# Patient Record
Sex: MALE | Race: WHITE | NOT HISPANIC OR LATINO | Employment: OTHER | URBAN - METROPOLITAN AREA
[De-identification: names, ages, dates, MRNs, and addresses within clinical notes are randomized per-mention and may not be internally consistent; named-entity substitution may affect disease eponyms.]

---

## 2017-01-03 ENCOUNTER — HOSPITAL ENCOUNTER (OUTPATIENT)
Dept: LAB | Facility: MEDICAL CENTER | Age: 72
End: 2017-01-03
Attending: UROLOGY
Payer: MEDICARE

## 2017-01-03 LAB — PSA SERPL DL<=0.01 NG/ML-MCNC: 0.47 NG/ML (ref 0–4)

## 2017-01-03 PROCEDURE — 36415 COLL VENOUS BLD VENIPUNCTURE: CPT

## 2017-01-03 PROCEDURE — 84153 ASSAY OF PSA TOTAL: CPT

## 2017-03-06 ENCOUNTER — TELEPHONE (OUTPATIENT)
Dept: HEALTH INFORMATION MANAGEMENT | Facility: OTHER | Age: 72
End: 2017-03-06

## 2017-03-06 ENCOUNTER — PATIENT OUTREACH (OUTPATIENT)
Dept: HEALTH INFORMATION MANAGEMENT | Facility: OTHER | Age: 72
End: 2017-03-06

## 2017-03-06 DIAGNOSIS — E11.9 CONTROLLED TYPE 2 DIABETES MELLITUS WITHOUT COMPLICATION, WITHOUT LONG-TERM CURRENT USE OF INSULIN (HCC): ICD-10-CM

## 2017-03-06 NOTE — PROGRESS NOTES
Attempt #:1     Annual Wellness Visit Scheduling  1. Scheduling Status:Scheduled       Care Gap Scheduling (Attempt to Schedule EACH Overdue Care Gap!)     Health Maintenance Due   Topic Date Due   • Annual Wellness Visit  SCHEDULED   • DIABETES MONOFILAMENT / LE EXAM  SCHEDULED   • RETINAL SCREENING  DID NOT DISCUSS   • IMM ZOSTER VACCINE  DECLINED   • IMM DTaP/Tdap/Td Vaccine (1 - Tdap) NOT DUE HAD TD VACCINE IN 2009   • A1C SCREENING  SCHEDULED LAB APT         MyChart Activation: already active

## 2017-03-06 NOTE — TELEPHONE ENCOUNTER
This patient is overdue for health maintenance topics that need orders so he can complete them.     Please review the pended orders in  to sign or make adjustments as appropriate.    Close the encounter when complete.  If declining these orders, please document a reason and route to the Outreach MA pool (p AMB  Outreach).  I will call the patient to cancel the appointment.

## 2017-03-10 ENCOUNTER — HOSPITAL ENCOUNTER (OUTPATIENT)
Dept: LAB | Facility: MEDICAL CENTER | Age: 72
End: 2017-03-10
Attending: FAMILY MEDICINE
Payer: MEDICARE

## 2017-03-10 LAB
EST. AVERAGE GLUCOSE BLD GHB EST-MCNC: 174 MG/DL
HBA1C MFR BLD: 7.7 % (ref 0–5.6)

## 2017-03-10 PROCEDURE — 83036 HEMOGLOBIN GLYCOSYLATED A1C: CPT

## 2017-03-10 PROCEDURE — 36415 COLL VENOUS BLD VENIPUNCTURE: CPT

## 2017-03-13 ENCOUNTER — TELEPHONE (OUTPATIENT)
Dept: MEDICAL GROUP | Facility: MEDICAL CENTER | Age: 72
End: 2017-03-13

## 2017-03-13 NOTE — TELEPHONE ENCOUNTER
----- Message from Lev Chinchilla M.D. sent at 3/13/2017  3:40 PM PDT -----  Please call patient and inform that his labs are abnormal. His hemoglobin A1c(test for diabetes) has worsened from 6.4 to 7.7. He will be a candidate for medication. Please advise to make an appointment with Dr. Diego to discuss his diabetes and probably start medication.

## 2017-04-06 ENCOUNTER — TELEPHONE (OUTPATIENT)
Dept: MEDICAL GROUP | Facility: MEDICAL CENTER | Age: 72
End: 2017-04-06

## 2017-04-06 NOTE — TELEPHONE ENCOUNTER
PVP COMPLETE    LAST EYE EXAM-couple years ago     ADVANCE DIRECTIVE-information requested     SPECIALTY COMMENT-n/a      VACCINES-tdap

## 2017-04-06 NOTE — TELEPHONE ENCOUNTER
ANNUAL WELLNESS VISIT PRE-VISIT PLANNING     1.  Reviewed last PCP office visit assessment and plan notes: Yes 6/02/16    2.  If any orders were placed last visit do we have Results/Consult Notes?        •  Labs? No        •  Imaging? No       •  Referrals? No     3.  Patient Care Coordination Note was updated with diagnosis information:  Yes    4.  Patient is due for these Health Maintenance Topics:   Health Maintenance Due   Topic Date Due   • Annual Wellness Visit  1945   • DIABETES MONOFILAMENT / LE EXAM  1945   • RETINAL SCREENING  03/16/1965   • IMM DTaP/Tdap/Td Vaccine (1 - Tdap) 10/02/2009          •  DANIAL letter was faxed to:   for  records    5.  Immunizations were updated in [employer] using WebIZ?: Yes       •  Web Iz Recommendations:  Tdap  Hep A, adult  Hep B, adult  Zoster (Shingles)       •  Is patient due for Tdap/Shingles? Yes.  If yes, was patient alerted of copay? No    6.  Patient has:       •   Diabetes     7.  Updated Care Team with [employer] and all specialists?        •   Gait devices, O2, CPAP, etc: no        •   Eye professional: yes       •   Other specialists (GYN, cardiology, endo, etc): yes    8.  Is patient in need of any refills prior to office visit? No       •    Separate refill encounter created?: no    9.  Patient was informed to arrive 15 min prior to their scheduled appointment and bring in their medication bottles? yes    10.  Patient was advised: “This is a free wellness visit. The provider will screen for medical conditions to help you stay healthy. If you have other concerns to address you may be asked to discuss these at a separate visit or there may be an additional fee.”  Yes

## 2017-04-13 ENCOUNTER — OFFICE VISIT (OUTPATIENT)
Dept: MEDICAL GROUP | Facility: MEDICAL CENTER | Age: 72
End: 2017-04-13
Payer: MEDICARE

## 2017-04-13 VITALS
TEMPERATURE: 97.7 F | BODY MASS INDEX: 41.22 KG/M2 | DIASTOLIC BLOOD PRESSURE: 60 MMHG | WEIGHT: 272 LBS | HEART RATE: 96 BPM | HEIGHT: 68 IN | SYSTOLIC BLOOD PRESSURE: 132 MMHG | OXYGEN SATURATION: 95 %

## 2017-04-13 DIAGNOSIS — E66.01 OBESITY, MORBID, BMI 40.0-49.9 (HCC): ICD-10-CM

## 2017-04-13 DIAGNOSIS — Z85.46 PERSONAL HISTORY OF PROSTATE CANCER: ICD-10-CM

## 2017-04-13 DIAGNOSIS — R53.83 CAREGIVER WITH FATIGUE: ICD-10-CM

## 2017-04-13 DIAGNOSIS — Z91.09 ENVIRONMENTAL ALLERGIES: ICD-10-CM

## 2017-04-13 DIAGNOSIS — I10 ESSENTIAL HYPERTENSION: ICD-10-CM

## 2017-04-13 DIAGNOSIS — J45.20 MILD INTERMITTENT ASTHMA WITHOUT COMPLICATION: ICD-10-CM

## 2017-04-13 DIAGNOSIS — Z00.00 MEDICARE ANNUAL WELLNESS VISIT, SUBSEQUENT: ICD-10-CM

## 2017-04-13 DIAGNOSIS — H81.03 MENIERE'S DISEASE, BILATERAL: ICD-10-CM

## 2017-04-13 DIAGNOSIS — F43.21 ADJUSTMENT DISORDER WITH DEPRESSED MOOD: ICD-10-CM

## 2017-04-13 DIAGNOSIS — E78.2 MIXED HYPERLIPIDEMIA: ICD-10-CM

## 2017-04-13 DIAGNOSIS — E03.9 ACQUIRED HYPOTHYROIDISM: ICD-10-CM

## 2017-04-13 DIAGNOSIS — E11.65 TYPE 2 DIABETES MELLITUS WITH HYPERGLYCEMIA, WITHOUT LONG-TERM CURRENT USE OF INSULIN (HCC): ICD-10-CM

## 2017-04-13 PROCEDURE — G0439 PPPS, SUBSEQ VISIT: HCPCS | Performed by: FAMILY MEDICINE

## 2017-04-13 RX ORDER — ASPIRIN 81 MG/1
81 TABLET, CHEWABLE ORAL DAILY
COMMUNITY

## 2017-04-13 RX ORDER — METFORMIN HYDROCHLORIDE 500 MG/1
500 TABLET, EXTENDED RELEASE ORAL 2 TIMES DAILY
Qty: 60 TAB | Refills: 3 | Status: SHIPPED | OUTPATIENT
Start: 2017-04-13 | End: 2017-04-27

## 2017-04-13 RX ORDER — ESCITALOPRAM OXALATE 10 MG/1
10 TABLET ORAL DAILY
Qty: 30 TAB | Refills: 3 | Status: SHIPPED | OUTPATIENT
Start: 2017-04-13 | End: 2017-07-31 | Stop reason: SDUPTHER

## 2017-04-13 ASSESSMENT — PAIN SCALES - GENERAL: PAINLEVEL: NO PAIN

## 2017-04-13 ASSESSMENT — PATIENT HEALTH QUESTIONNAIRE - PHQ9
SUM OF ALL RESPONSES TO PHQ QUESTIONS 1-9: 5
CLINICAL INTERPRETATION OF PHQ2 SCORE: 3
5. POOR APPETITE OR OVEREATING: 0 - NOT AT ALL
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
## 2017-04-13 NOTE — MR AVS SNAPSHOT
"        Milton Javed   2017 3:00 PM   Office Visit   MRN: [id number]    Department:  [facility]   Dept Phone:  [phone]    Description:  Male : 1945   Provider:  Lilia Diego M.D.; [facility]            Reason for Visit     Annual Wellness Visit           Allergies as of 2017     Allergen Noted Reactions    Penicillins 2008   Anaphylaxis    Sulfa Drugs 2008       Chancre sores - throat      You were diagnosed with     Medicare annual wellness visit, subsequent   [256767]       Caregiver with fatigue   [614297]       Type 2 diabetes mellitus with hyperglycemia, without long-term current use of insulin (CMS-HCC)   [2520987]       Environmental allergies   [277744]       Adjustment disorder with depressed mood   [309.0.ICD-9-CM]       Essential hypertension   [1716931]       Meniere's disease, bilateral   [841215]       Mild intermittent asthma without complication   [191654]       Mixed hyperlipidemia   [272.2.ICD-9-CM]       Obesity, morbid, BMI 40.0-49.9 (CMS-[facility])   [034775]       Acquired hypothyroidism   [6859822]       Personal history of prostate cancer   [263530]         Vital Signs     Blood Pressure Pulse Temperature Height Weight Body Mass Index    132/60 mmHg 96 36.5 °C (97.7 °F) 1.715 m (5' 7.5\") 123.378 kg (272 lb) 41.95 kg/m2    Oxygen Saturation Smoking Status                95% Never Smoker           Basic Information     Date Of Birth Sex Race Ethnicity Preferred Language    1945 Male White Non- English      Problem List              ICD-10-CM Priority Class Noted - Resolved    Essential hypertension I10   Unknown - Present    Personal history of prostate cancer Z85.46   Unknown - Present    Acquired hypothyroidism E03.9   Unknown - Present    Type 2 diabetes mellitus with hyperglycemia, without long-term current use of insulin (CMS-HCC) E11.65   Unknown - Present    Mixed hyperlipidemia E78.2   Unknown - Present    Meniere's " disease H81.09   Unknown - Present    Environmental allergies Z91.09   Unknown - Present    Mild intermittent asthma without complication J45.20   Unknown - Present    History of rheumatic fever Z86.79   Unknown - Present    Caregiver with fatigue R53.83   4/13/2017 - Present    Adjustment disorder with depressed mood F43.21   4/13/2017 - Present    Obesity, morbid, BMI 40.0-49.9 (CMS-[facility]) E66.01   4/13/2017 - Present      Health Maintenance        Date Due Completion Dates    DIABETES MONOFILAMENT / LE EXAM 1945 ---    RETINAL SCREENING 3/16/1965 ---    IMM DTaP/Tdap/Td Vaccine (1 - Tdap) 10/2/2009 10/1/2009    IMM ZOSTER VACCINE 3/6/2018 (Originally 3/16/2007) ---    FASTING LIPID PROFILE 6/7/2017 6/7/2016, 11/8/2011, 6/15/2011, 8/10/2010, 2/26/2009, 6/16/2005    URINE ACR / MICROALBUMIN 6/7/2017 6/7/2016, 6/15/2011, 2/26/2009, 6/16/2005    SERUM CREATININE 6/7/2017 6/7/2016, 11/8/2011, 6/15/2011, 8/10/2010, 4/13/2009, 4/4/2009, 2/26/2009, 4/27/2008, 6/16/2005    A1C SCREENING 9/10/2017 3/10/2017, 6/7/2016, 11/8/2011, 6/15/2011, 8/10/2010, 2/26/2009, 6/16/2005    COLONOSCOPY 6/2/2018 6/2/2008 (Done)    Override on 6/2/2008: Done            Current Immunizations     13-VALENT PCV PREVNAR 9/3/2015, 8/1/2010    Influenza Vaccine Adult HD 9/30/2016    Pneumococcal polysaccharide vaccine (PPSV-23) 2/21/2017    TD Vaccine 10/1/2009, 10/1/2009      Below and/or attached are the medications your provider expects you to take. Review all of your home medications and newly ordered medications with your provider and/or pharmacist. Follow medication instructions as directed by your provider and/or pharmacist. Please keep your medication list with you and share with your provider. Update the information when medications are discontinued, doses are changed, or new medications (including over-the-counter products) are added; and carry medication information at all times in the event of emergency situations     Allergies:   PENICILLINS - Anaphylaxis     SULFA DRUGS - (reactions not documented)               Medications  Valid as of: April 13, 2017 -  4:07 PM    Generic Name Brand Name Tablet Size Instructions for use    Albuterol Sulfate (Aero Soln) albuterol 108 (90 BASE) MCG/ACT Inhale 2 Puffs by mouth every 6 hours as needed for Shortness of Breath.        Ascorbic Acid (Tab) Vitamin C 1000 MG Take  by mouth.        Aspirin (Chew Tab) ASA 81 MG Take 81 mg by mouth every day.        B Complex Vitamins   Take  by mouth.        Cholecalciferol   Take  by mouth.        Escitalopram Oxalate (Tab) LEXAPRO 10 MG Take 1 Tab by mouth every day.        Fish Oil (Oil) Fish Oil  by Does not apply route.        Liothyronine Sodium (Tab) CYTOMEL 5 MCG Take 1 Tab by mouth every day.        MetFORMIN HCl (TABLET SR 24 HR) GLUCOPHAGE  MG Take 1 Tab by mouth 2 times a day.        NIFEdipine (TABLET SR 24 HR) ADALAT CC 90 MG Take 1 Tab by mouth every day.        Probiotic Product   Take  by mouth.        Triamterene-HCTZ (Tab) MAXZIDE 75-50 75-50 MG Take 75 Tabs by mouth every day.        .                 Medicines prescribed today were sent to:     [id number] - [place] [address] [facility] [place]    [address] [address] [address]    Phone: [phone] Fax: [phone]    Open 24 Hours?: No      Medication refill instructions:       If your prescription bottle indicates you have medication refills left, it is not necessary to call your provider’s office. Please contact your pharmacy and they will refill your medication.    If your prescription bottle indicates you do not have any refills left, you may request refills at any time through one of the following ways: The online [employer] system (except Urgent Care), by calling your provider’s office, or by asking your pharmacy to contact your provider’s office with a refill request. Medication refills are processed only during regular business hours and may not be available until the next business  day. Your provider may request additional information or to have a follow-up visit with you prior to refilling your medication.   *Please Note: Medication refills are assigned a new Rx number when refilled electronically. Your pharmacy may indicate that no refills were authorized even though a new prescription for the same medication is available at the pharmacy. Please request the medicine by name with the pharmacy before contacting your provider for a refill.        Your To Do List     Future Labs/Procedures Complete By Expires    COMP METABOLIC PANEL (For Serum Creatinine Topic, choose 1 only)  As directed 4/13/2018    FREE THYROXINE  As directed 4/14/2018    LIPID PROFILE  As directed 4/13/2018    MICROALBUMIN CREAT RATIO URINE (LAB COLLECT)  As directed 4/13/2018    TSH  As directed 4/14/2018         ITemat Access Code: Activation code not generated  Current FigCard Status: Active

## 2017-04-19 ENCOUNTER — HOSPITAL ENCOUNTER (OUTPATIENT)
Dept: LAB | Facility: MEDICAL CENTER | Age: 72
End: 2017-04-19
Attending: FAMILY MEDICINE
Payer: MEDICARE

## 2017-04-19 DIAGNOSIS — E03.9 ACQUIRED HYPOTHYROIDISM: ICD-10-CM

## 2017-04-19 DIAGNOSIS — E11.65 TYPE 2 DIABETES MELLITUS WITH HYPERGLYCEMIA, WITHOUT LONG-TERM CURRENT USE OF INSULIN (HCC): ICD-10-CM

## 2017-04-19 DIAGNOSIS — E78.2 MIXED HYPERLIPIDEMIA: ICD-10-CM

## 2017-04-19 DIAGNOSIS — I10 ESSENTIAL HYPERTENSION: ICD-10-CM

## 2017-04-19 LAB
CREAT UR-MCNC: 231.5 MG/DL
MICROALBUMIN UR-MCNC: 1.6 MG/DL
MICROALBUMIN/CREAT UR: 7 MG/G (ref 0–30)
T4 FREE SERPL-MCNC: 0.73 NG/DL (ref 0.53–1.43)
TSH SERPL DL<=0.005 MIU/L-ACNC: 6.25 UIU/ML (ref 0.3–3.7)

## 2017-04-19 PROCEDURE — 80061 LIPID PANEL: CPT

## 2017-04-19 PROCEDURE — 80053 COMPREHEN METABOLIC PANEL: CPT

## 2017-04-19 PROCEDURE — 36415 COLL VENOUS BLD VENIPUNCTURE: CPT

## 2017-04-19 PROCEDURE — 82043 UR ALBUMIN QUANTITATIVE: CPT

## 2017-04-19 PROCEDURE — 82570 ASSAY OF URINE CREATININE: CPT

## 2017-04-19 PROCEDURE — 84439 ASSAY OF FREE THYROXINE: CPT

## 2017-04-19 PROCEDURE — 84443 ASSAY THYROID STIM HORMONE: CPT

## 2017-04-20 LAB
ALBUMIN SERPL BCP-MCNC: 4.2 G/DL (ref 3.2–4.9)
ALBUMIN/GLOB SERPL: 1.6 G/DL
ALP SERPL-CCNC: 57 U/L (ref 30–99)
ALT SERPL-CCNC: 32 U/L (ref 2–50)
ANION GAP SERPL CALC-SCNC: 9 MMOL/L (ref 0–11.9)
AST SERPL-CCNC: 22 U/L (ref 12–45)
BILIRUB SERPL-MCNC: 1 MG/DL (ref 0.1–1.5)
BUN SERPL-MCNC: 23 MG/DL (ref 8–22)
CALCIUM SERPL-MCNC: 9.4 MG/DL (ref 8.5–10.5)
CHLORIDE SERPL-SCNC: 103 MMOL/L (ref 96–112)
CHOLEST SERPL-MCNC: 216 MG/DL (ref 100–199)
CO2 SERPL-SCNC: 25 MMOL/L (ref 20–33)
CREAT SERPL-MCNC: 1.4 MG/DL (ref 0.5–1.4)
GFR SERPL CREATININE-BSD FRML MDRD: 50 ML/MIN/1.73 M 2
GLOBULIN SER CALC-MCNC: 2.7 G/DL (ref 1.9–3.5)
GLUCOSE SERPL-MCNC: 165 MG/DL (ref 65–99)
HDLC SERPL-MCNC: 34 MG/DL
LDLC SERPL CALC-MCNC: 152 MG/DL
POTASSIUM SERPL-SCNC: 3.6 MMOL/L (ref 3.6–5.5)
PROT SERPL-MCNC: 6.9 G/DL (ref 6–8.2)
SODIUM SERPL-SCNC: 137 MMOL/L (ref 135–145)
TRIGL SERPL-MCNC: 148 MG/DL (ref 0–149)

## 2017-04-25 ENCOUNTER — NON-PROVIDER VISIT (OUTPATIENT)
Dept: HEALTH INFORMATION MANAGEMENT | Facility: MEDICAL CENTER | Age: 72
End: 2017-04-25
Payer: MEDICARE

## 2017-04-25 DIAGNOSIS — E11.65 TYPE 2 DIABETES MELLITUS WITH HYPERGLYCEMIA, WITHOUT LONG-TERM CURRENT USE OF INSULIN (HCC): ICD-10-CM

## 2017-04-25 PROCEDURE — G0109 DIAB MANAGE TRN IND/GROUP: HCPCS | Performed by: INTERNAL MEDICINE

## 2017-04-25 NOTE — MR AVS SNAPSHOT
Milton Javed   2017 9:00 AM   Non-Provider Visit   MRN: [id number]    Department:  [facility]   Dept Phone:  [phone]    Description:  Male : 1945   Provider:  TYPE 2 CLASS           Reason for Visit     Diabetes Mellitus           Allergies as of 2017     Allergen Noted Reactions    Penicillins 2008   Anaphylaxis    Sulfa Drugs 2008       Chancre sores - throat      You were diagnosed with     Type 2 diabetes mellitus with hyperglycemia, without long-term current use of insulin (CMS-[facility])   [[id number]]         Vital Signs     Smoking Status                   Never Smoker            Basic Information     Date Of Birth Sex Race Ethnicity Preferred Language    1945 Male White Non- English      Your appointments     2017  2:00 PM   Diabetes Care Visit with Lilia Diego M.D., [facility] DIABETES RN   [facility] ([facility])    [address]  [place] [id number] [phone]           You will be receiving a confirmation call a few days before your appointment from our automated call confirmation system.            May 05, 2017  9:00 AM   Type II Class Day 2 with TYPE 2 CLASS   [facility] Improvement Programs ([facility])    [address]  [place] [id number] [phone]           It is the patient's responsibility to check with your Insurance for benefit coverage for visit / visits.  Arrive 15 minutes before your scheduled appt time  Please eat before arriving.  24 hours notice is required for all appointment changes or cancellation.  All visits are required to successfully complete the program  Please bring the following with you: 1) Picture Id 2) Insurance card 3) New patient forms including the Comprehensive      Patient History Form 4) All medication (including insulin) 5) Blood glucose monitor and strips 6) Blood glucose logs (if you have them) 7) Morning/afternoon snack if you generally  eat one              Problem List              ICD-10-CM Priority Class Noted - Resolved    Essential hypertension I10   Unknown - Present    Personal history of prostate cancer Z85.46   Unknown - Present    Acquired hypothyroidism E03.9   Unknown - Present    Type 2 diabetes mellitus with hyperglycemia, without long-term current use of insulin (CMS-HCC) E11.65   Unknown - Present    Mixed hyperlipidemia E78.2   Unknown - Present    Meniere's disease H81.09   Unknown - Present    Environmental allergies Z91.09   Unknown - Present    Mild intermittent asthma without complication J45.20   Unknown - Present    History of rheumatic fever Z86.79   Unknown - Present    Caregiver with fatigue R53.83   4/13/2017 - Present    Adjustment disorder with depressed mood F43.21   4/13/2017 - Present    Obesity, morbid, BMI 40.0-49.9 (CMS-HCC) E66.01   4/13/2017 - Present      Health Maintenance        Date Due Completion Dates    DIABETES MONOFILAMENT / LE EXAM 1945 ---    RETINAL SCREENING 3/16/1965 ---    IMM DTaP/Tdap/Td Vaccine (1 - Tdap) 10/2/2009 10/1/2009    IMM ZOSTER VACCINE 3/6/2018 (Originally 3/16/2007) ---    A1C SCREENING 9/10/2017 3/10/2017, 6/7/2016, 11/8/2011, 6/15/2011, 8/10/2010, 2/26/2009, 6/16/2005    FASTING LIPID PROFILE 4/19/2018 4/19/2017, 6/7/2016, 11/8/2011, 6/15/2011, 8/10/2010, 2/26/2009, 6/16/2005    URINE ACR / MICROALBUMIN 4/19/2018 4/19/2017, 6/7/2016, 6/15/2011, 2/26/2009, 6/16/2005    SERUM CREATININE 4/19/2018 4/19/2017, 6/7/2016, 11/8/2011, 6/15/2011, 8/10/2010, 4/13/2009, 4/4/2009, 2/26/2009, 4/27/2008, 6/16/2005    COLONOSCOPY 6/2/2018 6/2/2008 (Done)    Override on 6/2/2008: Done            Current Immunizations     13-VALENT PCV PREVNAR 9/3/2015, 8/1/2010    Influenza Vaccine Adult HD 9/30/2016    Pneumococcal polysaccharide vaccine (PPSV-23) 2/21/2017    TD Vaccine 10/1/2009, 10/1/2009      Below and/or attached are the medications your provider expects you to take. Review all of your  home medications and newly ordered medications with your provider and/or pharmacist. Follow medication instructions as directed by your provider and/or pharmacist. Please keep your medication list with you and share with your provider. Update the information when medications are discontinued, doses are changed, or new medications (including over-the-counter products) are added; and carry medication information at all times in the event of emergency situations     Allergies:  PENICILLINS - Anaphylaxis     SULFA DRUGS - (reactions not documented)               Medications  Valid as of: April 25, 2017 - 12:56 PM    Generic Name Brand Name Tablet Size Instructions for use    Albuterol Sulfate (Aero Soln) albuterol 108 (90 BASE) MCG/ACT Inhale 2 Puffs by mouth every 6 hours as needed for Shortness of Breath.        Ascorbic Acid (Tab) Vitamin C 1000 MG Take  by mouth.        Aspirin (Chew Tab) ASA 81 MG Take 81 mg by mouth every day.        B Complex Vitamins   Take  by mouth.        Blood Glucose Monitoring Suppl (Misc) Blood Glucose Monitoring Suppl SUPPLIES Test strips order: Test strips for [employer] Contour Next meter. Sig: use daily for medication adjustment.  Dx. Code E11.65        Cholecalciferol   Take  by mouth.        Escitalopram Oxalate (Tab) LEXAPRO 10 MG Take 1 Tab by mouth every day.        Fish Oil (Oil) Fish Oil  by Does not apply route.        Liothyronine Sodium (Tab) CYTOMEL 5 MCG Take 1 Tab by mouth every day.        MetFORMIN HCl (TABLET SR 24 HR) GLUCOPHAGE  MG Take 1 Tab by mouth 2 times a day.        NIFEdipine (TABLET SR 24 HR) ADALAT CC 90 MG Take 1 Tab by mouth every day.        Probiotic Product   Take  by mouth.        Triamterene-HCTZ (Tab) MAXZIDE 75-50 75-50 MG Take 75 Tabs by mouth every day.        .                 Medicines prescribed today were sent to:     [id number] [place] - [address] [facility] [place]    [address] [facility] [place] [place] [address]    Phone: [phone] Fax: [phone]       Open 24 Hours?: No      Medication refill instructions:       If your prescription bottle indicates you have medication refills left, it is not necessary to call your provider’s office. Please contact your pharmacy and they will refill your medication.    If your prescription bottle indicates you do not have any refills left, you may request refills at any time through one of the following ways: The online [employer] system (except Urgent Care), by calling your provider’s office, or by asking your pharmacy to contact your provider’s office with a refill request. Medication refills are processed only during regular business hours and may not be available until the next business day. Your provider may request additional information or to have a follow-up visit with you prior to refilling your medication.   *Please Note: Medication refills are assigned a new Rx number when refilled electronically. Your pharmacy may indicate that no refills were authorized even though a new prescription for the same medication is available at the pharmacy. Please request the medicine by name with the pharmacy before contacting your provider for a refill.           [employer] Access Code: Activation code not generated  Current [employer] Status: Active

## 2017-04-25 NOTE — Clinical Note
April 25, 2017            RE: Milton Javed  [birth year] [id number]    Dear:Lilia Diego MD    The above referenced patient received 3 hours of diabetes education from [facility].    Topics taught (may include but not limited to):  Introduction to diabetes, benefits and responsibilities of patient, physiology of diabetes and the diease process, benefits of blood glucose monitoring and record keeping, medication action and possible side effects, hypoglycemia, sick day management, exercise, stress reduction and travel with diabetes.     Provided meter and instructed in use: no. Pt using [employer] Contourmeter.    Dilated eye exam within the past year: no Goal is for patient to have yearly.   Lipid panel:   Lab Results   Component Value Date/Time    CHOLESTEROL,* 04/19/2017 08:43 AM    * 04/19/2017 08:43 AM    HDL 34* 04/19/2017 08:43 AM    TRIGLYCERIDES 148 04/19/2017 08:43 AM   HbA1c:   Lab Results   Component Value Date/Time    GLYCOHEMOGLOBIN 7.7* 03/10/2017 10:21 AM     Patient/caregiver appeared to understand the content as demonstrated by appropriate questions.         The patient was provided with written materials to back-up verbal education.   Thank you for this consultation,     Melanie Michaud R.N.  Certified Diabetes Nurse Educator

## 2017-04-25 NOTE — PROGRESS NOTES
Attended Type 2 Self Management Class on : 4/25/17  Time: 1521-8388  Has had diabetes since: 2017  Medications for diabetes: Metformin 500 mg bid.     Exercise: none  Eye Exam: not yet  Foot Exam: no    Diabetes Education Content    Introduction To Diabetes   Define Type 2 diabetes: Education taught  Define Type 1 diabetes: Education taught  Understand feaures and benefits of education and management: Education taught  Describe who is responsible for diabetes management: Education taught  Describe impact of diabetes on family/friends: Education taught  Discuss role of significant other in management: Education taught  Diabetes Lifestyle Changes / Goals  State benefits of making appropriate lifestyle changes: Education taught  Identify lifestyle behaviors participant wants to change: Education taught  Identify risk factors that interfere with health and strategies to reduce : Education taught  Verbalize need for and frequency of health care follow-up: Education taught  Develop behavioral objectives and expected health outcomes: Education taught  Diabetes Exercise and Activity  Describe role of exercise in diabetes management: Education taught  State relationship of exercise to blood sugar: Education taught  State the benefits/risk(s) of exercise and precautions to follow: Education taught  Diabetes Self Blood Glucose Monitoring  Discuss rationale and importance of SBGM: Education taught  Discuss appropriate record keeping: Education taught  Discuss how to use results from blood glucose testing: Education taught  Evaluation and interpretation of blood glucose patterns: Education taught  Diabetes Disease Process  Discuss signs, symptoms, TX and prevention of hyperglycemia: Education taught  Discuss beta cell dysfunction and insulin resistance: Education taught  Discuss Insulin and its role in the body: Education taught  Discuss the role of the liver in glucose metabolism: Education taught  Discuss hormonal  "regulation: Education taught  Define benefits of good control and discuss what it means to be in \"good control\": Education taught  Discuss impact of exercise, food, meds, stress, and special factors on diabetes: Education taught  Discuss when to confer with HCP for possible treatment plan adjustments: Education taught  Diabetes Insulin and Medications  Action of oral medications, onset and duration: Education taught  Discuss incretin secretagogs and their use in diabetes management: Education taught  Identify the onset, peak and duration of different insulin: Education taught  Discuss proper injection technique and site rotation: Education taught  Discuss storage of insulin and disposal of sharps: Education taught  Hypoglycemia  List signs, symptoms and causes of hypoglycemia: Education taught  Discuss physiology of hypoglycemic reactions: Education taught  Accurately describe appropriate treatment and prevention: Education taught  Discuss when to contact HCP: Education taught  Sick Day Care  Verbalize important items to monitor when sick and when to contact HCP: Education taught  Review sick day box: Education taught  State diabetes medication adjustments on sick days: Education taught  Complications (Chronic)  Explain prevention, TX, signs/symptoms of: retinopathy, neuropathy, nephropathy, infections: Education taught  Explain prevention, TX, signs/symptoms of: CAD, cerebral-vascular disease and sexual dysfunction: Education taught  Identify when to notify HCP of complications: Education taught  State principles of skin, dental and foot care.  Discuss proper foot care, prevention of foot probelms when to notify HCP>: Education taught  Demonstrate how to examine feet and what to look for: Education taught  Psychosocial adjustment  Identify sources of stress: Education taught  Identify resources and techniques for stress reduction: Education taught  Discuss health care referral network / community resources for " support: Education taught  Define proper precautions to use when traveling: Education taught

## 2017-04-26 ENCOUNTER — TELEPHONE (OUTPATIENT)
Dept: MEDICAL GROUP | Facility: MEDICAL CENTER | Age: 72
End: 2017-04-26

## 2017-04-26 NOTE — TELEPHONE ENCOUNTER
----- Message from Lilia Diego M.D. sent at 4/26/2017 12:08 PM PDT -----  Please have patient make an appointment to l discuss results.  Lilia Diego M.D.

## 2017-04-27 ENCOUNTER — OFFICE VISIT (OUTPATIENT)
Dept: MEDICAL GROUP | Facility: MEDICAL CENTER | Age: 72
End: 2017-04-27
Payer: MEDICARE

## 2017-04-27 VITALS
HEIGHT: 69 IN | TEMPERATURE: 98 F | BODY MASS INDEX: 39.4 KG/M2 | WEIGHT: 266 LBS | HEART RATE: 89 BPM | SYSTOLIC BLOOD PRESSURE: 132 MMHG | DIASTOLIC BLOOD PRESSURE: 60 MMHG | OXYGEN SATURATION: 96 %

## 2017-04-27 DIAGNOSIS — E78.2 MIXED HYPERLIPIDEMIA: ICD-10-CM

## 2017-04-27 DIAGNOSIS — E66.9 OBESITY (BMI 30-39.9): ICD-10-CM

## 2017-04-27 DIAGNOSIS — E11.65 TYPE 2 DIABETES MELLITUS WITH HYPERGLYCEMIA, WITHOUT LONG-TERM CURRENT USE OF INSULIN (HCC): ICD-10-CM

## 2017-04-27 DIAGNOSIS — F43.21 ADJUSTMENT DISORDER WITH DEPRESSED MOOD: ICD-10-CM

## 2017-04-27 DIAGNOSIS — I10 ESSENTIAL HYPERTENSION: ICD-10-CM

## 2017-04-27 PROCEDURE — 92250 FUNDUS PHOTOGRAPHY W/I&R: CPT | Mod: TC | Performed by: FAMILY MEDICINE

## 2017-04-27 PROCEDURE — 99215 OFFICE O/P EST HI 40 MIN: CPT | Performed by: FAMILY MEDICINE

## 2017-04-27 RX ORDER — METFORMIN HYDROCHLORIDE 500 MG/1
1000 TABLET, EXTENDED RELEASE ORAL 2 TIMES DAILY
Qty: 360 TAB | Refills: 3 | Status: SHIPPED | OUTPATIENT
Start: 2017-04-27 | End: 2018-05-20 | Stop reason: SDUPTHER

## 2017-04-27 RX ORDER — METFORMIN HYDROCHLORIDE 500 MG/1
1000 TABLET, EXTENDED RELEASE ORAL 2 TIMES DAILY
Qty: 360 TAB | Refills: 3 | Status: SHIPPED | OUTPATIENT
Start: 2017-04-27 | End: 2017-04-27 | Stop reason: SDUPTHER

## 2017-04-27 RX ORDER — LOSARTAN POTASSIUM 50 MG/1
50 TABLET ORAL DAILY
Qty: 90 TAB | Refills: 1 | Status: SHIPPED | OUTPATIENT
Start: 2017-04-27 | End: 2017-11-02 | Stop reason: SDUPTHER

## 2017-04-27 RX ORDER — PRAVASTATIN SODIUM 40 MG
40 TABLET ORAL DAILY
Qty: 90 TAB | Refills: 1 | Status: SHIPPED | OUTPATIENT
Start: 2017-04-27 | End: 2019-06-19

## 2017-04-27 NOTE — MR AVS SNAPSHOT
"        Milton Javed   2017 2:00 PM   Office Visit   MRN: [id number]    Department:  [facility]   Dept Phone:  [phone]    Description:  Male : 1945   Provider:  Lilia Diego M.D.; [facility] DIABETES RN           Reason for Visit     Diabetes           Allergies as of 2017     Allergen Noted Reactions    Lisinopril 2017       cough    Lovastatin 2017       Made nauseated    Penicillins 2008   Anaphylaxis    Sulfa Drugs 2008       Chancre sores - throat      You were diagnosed with     Type 2 diabetes mellitus with hyperglycemia, without long-term current use of insulin (CMS-[facility])   [[id number]]         Vital Signs     Blood Pressure Pulse Temperature Height Weight Body Mass Index    132/60 mmHg 89 36.7 °C (98 °F) 1.753 m (5' 9\") 120.657 kg (266 lb) 39.26 kg/m2    Oxygen Saturation Smoking Status                96% Never Smoker           Basic Information     Date Of Birth Sex Race Ethnicity Preferred Language    1945 Male White Non- English      Your appointments     May 05, 2017  9:00 AM   Type II Class Day 2 with TYPE 2 CLASS   Health Improvement Programs ([facility])    [address]  [place] [address] [phone]           It is the patient's responsibility to check with your Insurance for benefit coverage for visit / visits.  Arrive 15 minutes before your scheduled appt time  Please eat before arriving.  24 hours notice is required for all appointment changes or cancellation.  All visits are required to successfully complete the program  Please bring the following with you: 1) Picture Id 2) Insurance card 3) New patient forms including the Comprehensive      Patient History Form 4) All medication (including insulin) 5) Blood glucose monitor and strips 6) Blood glucose logs (if you have them) 7) Morning/afternoon snack if you generally eat one            2017  2:20 PM   Established Patient with Lilia Diego M.D. "   [facility] ([facility])    [address]  [place] [id number] [phone]           You will be receiving a confirmation call a few days before your appointment from our automated call confirmation system.              Problem List              ICD-10-CM Priority Class Noted - Resolved    Essential hypertension I10   Unknown - Present    Personal history of prostate cancer Z85.46   Unknown - Present    Acquired hypothyroidism E03.9   Unknown - Present    Type 2 diabetes mellitus with hyperglycemia, without long-term current use of insulin (CMS-HCC) E11.65   Unknown - Present    Mixed hyperlipidemia E78.2   Unknown - Present    Meniere's disease H81.09   Unknown - Present    Environmental allergies Z91.09   Unknown - Present    Mild intermittent asthma without complication J45.20   Unknown - Present    History of rheumatic fever Z86.79   Unknown - Present    Caregiver with fatigue R53.83   4/13/2017 - Present    Adjustment disorder with depressed mood F43.21   4/13/2017 - Present    Obesity, morbid, BMI 40.0-49.9 (CMS-HCC) E66.01   4/13/2017 - Present      Health Maintenance        Date Due Completion Dates    DIABETES MONOFILAMENT / LE EXAM 1945 ---    RETINAL SCREENING 3/16/1965 ---    IMM DTaP/Tdap/Td Vaccine (1 - Tdap) 10/2/2009 10/1/2009    IMM ZOSTER VACCINE 3/6/2018 (Originally 3/16/2007) ---    A1C SCREENING 9/10/2017 3/10/2017, 6/7/2016, 11/8/2011, 6/15/2011, 8/10/2010, 2/26/2009, 6/16/2005    FASTING LIPID PROFILE 4/19/2018 4/19/2017, 6/7/2016, 11/8/2011, 6/15/2011, 8/10/2010, 2/26/2009, 6/16/2005    URINE ACR / MICROALBUMIN 4/19/2018 4/19/2017, 6/7/2016, 6/15/2011, 2/26/2009, 6/16/2005    SERUM CREATININE 4/19/2018 4/19/2017, 6/7/2016, 11/8/2011, 6/15/2011, 8/10/2010, 4/13/2009, 4/4/2009, 2/26/2009, 4/27/2008, 6/16/2005    COLONOSCOPY 6/2/2018 6/2/2008 (Done)    Override on 6/2/2008: Done            Current Immunizations     13-VALENT PCV PREVNAR 9/3/2015,  8/1/2010    Influenza Vaccine Adult HD 9/30/2016    Pneumococcal polysaccharide vaccine (PPSV-23) 2/21/2017    TD Vaccine 10/1/2009, 10/1/2009      Below and/or attached are the medications your provider expects you to take. Review all of your home medications and newly ordered medications with your provider and/or pharmacist. Follow medication instructions as directed by your provider and/or pharmacist. Please keep your medication list with you and share with your provider. Update the information when medications are discontinued, doses are changed, or new medications (including over-the-counter products) are added; and carry medication information at all times in the event of emergency situations     Allergies:  LISINOPRIL - (reactions not documented)     LOVASTATIN - (reactions not documented)     PENICILLINS - Anaphylaxis     SULFA DRUGS - (reactions not documented)               Medications  Valid as of: April 27, 2017 -  3:03 PM    Generic Name Brand Name Tablet Size Instructions for use    Albuterol Sulfate (Aero Soln) albuterol 108 (90 BASE) MCG/ACT Inhale 2 Puffs by mouth every 6 hours as needed for Shortness of Breath.        Ascorbic Acid (Tab) Vitamin C 1000 MG Take  by mouth.        Aspirin (Chew Tab) ASA 81 MG Take 81 mg by mouth every day.        B Complex Vitamins   Take  by mouth.        Blood Glucose Monitoring Suppl (Misc) Blood Glucose Monitoring Suppl SUPPLIES Test strips order: Test strips for [employer] Contour Next meter. Sig: use daily for medication adjustment.  Dx. Code E11.65        Cholecalciferol   Take  by mouth.        Escitalopram Oxalate (Tab) LEXAPRO 10 MG Take 1 Tab by mouth every day.        Fish Oil (Oil) Fish Oil  by Does not apply route.        Liothyronine Sodium (Tab) CYTOMEL 5 MCG Take 1 Tab by mouth every day.        MetFORMIN HCl (TABLET SR 24 HR) GLUCOPHAGE  MG Take 1 Tab by mouth 2 times a day.        MetFORMIN HCl (TABLET SR 24 HR) GLUCOPHAGE  MG Take 2 Tabs by  mouth 2 times a day.        NIFEdipine (TABLET SR 24 HR) ADALAT CC 90 MG Take 1 Tab by mouth every day.        Probiotic Product   Take  by mouth.        Triamterene-HCTZ (Tab) MAXZIDE 75-50 75-50 MG Take 75 Tabs by mouth every day.        .                 Medicines prescribed today were sent to:     [id number] [place] - [address] [facility] [place]    [address] [facility] [place] [address]    Phone: [phone] Fax: [phone]    Open 24 Hours?: No      Medication refill instructions:       If your prescription bottle indicates you have medication refills left, it is not necessary to call your provider’s office. Please contact your pharmacy and they will refill your medication.    If your prescription bottle indicates you do not have any refills left, you may request refills at any time through one of the following ways: The online [employer] system (except Urgent Care), by calling your provider’s office, or by asking your pharmacy to contact your provider’s office with a refill request. Medication refills are processed only during regular business hours and may not be available until the next business day. Your provider may request additional information or to have a follow-up visit with you prior to refilling your medication.   *Please Note: Medication refills are assigned a new Rx number when refilled electronically. Your pharmacy may indicate that no refills were authorized even though a new prescription for the same medication is available at the pharmacy. Please request the medicine by name with the pharmacy before contacting your provider for a refill.           [employer] Access Code: Activation code not generated  Current [employer] Status: Active

## 2017-04-27 NOTE — PROGRESS NOTES
RN-CDE Note  Type 2 Diabetes  Subjective:     Health changes since last visit/interval Hx: Went to the Type 2 Diabetes education program 4/29/17 and states learned a lot.    Medications (including changes made today)  Current Outpatient Prescriptions   Medication Sig Dispense Refill   • aspirin (ASA) 81 MG Chew Tab chewable tablet Take 81 mg by mouth every day.     • B Complex Vitamins (VITAMIN B COMPLEX PO) Take  by mouth.     • metformin ER (GLUCOPHAGE XR) 500 MG TABLET SR 24 HR Take 1 Tab by mouth 2 times a day. 60 Tab 3   • escitalopram (LEXAPRO) 10 MG Tab Take 1 Tab by mouth every day. 30 Tab 3   • Blood Glucose Monitoring Suppl SUPPLIES Misc Test strips order: Test strips for [employer] Contour Next meter. Sig: use daily for medication adjustment.  Dx. Code E11.65 100 Strip 3   • Cholecalciferol (VITAMIN D3 PO) Take  by mouth.     • Fish Oil Oil by Does not apply route.     • Ascorbic Acid (VITAMIN C) 1000 MG Tab Take  by mouth.     • Probiotic Product (PROBIOTIC DAILY PO) Take  by mouth.     • triamterene-hydrochlorothiazide (MAXZIDE 75-50) 75-50 MG Tab Take 75 Tabs by mouth every day. 90 Tab 3   • NIFEdipine (ADALAT CC) 90 MG CR tablet Take 1 Tab by mouth every day. 90 Tab 3   • liothyronine (CYTOMEL) 5 MCG Tab Take 1 Tab by mouth every day. 90 Tab 3   • albuterol 108 (90 BASE) MCG/ACT Aero Soln inhalation aerosol Inhale 2 Puffs by mouth every 6 hours as needed for Shortness of Breath. 8.5 g 3     No current facility-administered medications for this visit.         Taking daily ASA: Yes  Taking above medications as prescribed: Yes   Patient Denies side effects of medication.    Exercise: Walking daily  Diet: meals per day on average: Breakfast, light lunch, and dinner.  Protein snack before bed.    Health Maintenance:   Health Maintenance Topics with due status: Overdue       Topic Date Due    DIABETES MONOFILAMENT / LE EXAM 1945    RETINAL SCREENING 03/16/1965    IMM DTaP/Tdap/Td Vaccine 10/02/2009          DM:   Last A1c:   Lab Results   Component Value Date/Time    GLYCOHEMOGLOBIN 7.7* 03/10/2017 10:21 AM      A1c goal: < 7    Glucose monitoring frequency: Testing daily at different times.  trend: 120's-160  Hypoglycemic episodes: no     Last Retinal Exam: 2 years ago  Daily Foot Exam: yes  Routine Dental Exams: yes    Lab Results   Component Value Date/Time    MICRO ALB CREAT RATIO 7 04/19/2017 08:43 AM    MICROALBUMIN, URINE RANDOM 1.6 04/19/2017 08:43 AM        ACR Albumin/Creatinine Ratio goal <30     Diabetic complications: none    HTN:   Blood pressure goal <140/<80 .   Currently Rx ACE/ARB: No    Dyslipidemia:    Lab Results   Component Value Date/Time    CHOLESTEROL,* 04/19/2017 08:43 AM    * 04/19/2017 08:43 AM    HDL 34* 04/19/2017 08:43 AM    TRIGLYCERIDES 148 04/19/2017 08:43 AM       Lab Results   Component Value Date/Time    SODIUM 137 04/19/2017 08:43 AM    POTASSIUM 3.6 04/19/2017 08:43 AM    CHLORIDE 103 04/19/2017 08:43 AM    CO2 25 04/19/2017 08:43 AM    GLUCOSE 165* 04/19/2017 08:43 AM    BUN 23* 04/19/2017 08:43 AM    CREATININE 1.40 04/19/2017 08:43 AM     Lab Results   Component Value Date/Time    ALKALINE PHOSPHATASE 57 04/19/2017 08:43 AM    AST(SGOT) 22 04/19/2017 08:43 AM    ALT(SGPT) 32 04/19/2017 08:43 AM    TOTAL BILIRUBIN 1.0 04/19/2017 08:43 AM        Currently Rx Statin: No      He  reports that he has never smoked. He has never used smokeless tobacco.    Objective:     Exam:  Monofilament: done  Monofilament testing with a 10 gram force: sensation intact: intact bilaterally  Visual Inspection: Feet without maceration, ulcers, fissures.  Pedal pulses: intact bilaterally      Plan:     - Diabetic diet discussed in detail-plate method.  - Home glucose monitoring.  - He will test and log.    - He will walk for 20-30 minutes daily.  - Reviewed medications and advised to take metformin after meals to decrease   G.I.upset.   - Discussed importance of immunizations and  yearly eye exams.   - Encouraged patient to attend diabetes education program.   -Educational material distributed.   - Advised daily foot exams. Educated on signs of infection.       Recommended medication changes: He states the statins made him nauseated and lisinopril caused him to cough.  He may need to consider some type of cholesterol medication and losartan.  He will go up on his Metformin  mg to 2 BID.

## 2017-05-01 NOTE — PROGRESS NOTES
Subjective:     HPI    Chief Complaint   Patient presents with   • Diabetes       Milton Javed is a 72 y.o. male who presents for follow up of chronic conditions of diabetes mellitus, hypertension, hyperlipidemia, .    RN-CDE notes reviewed including HPI for DM, HTN, Hyperlipidemia.  Exercise frequency and limitations reviewed.  Feet symptoms reviewed.  Nutritional status reviewed.  Retinal eye exam history reviewed.    Patient additionally reports:          He indicates that he is feeling well and denies any symptoms referable to the above diagnoses. Specifically denies chest pain, palpitations, dyspnea, orthopnea, PND or peripheral edema. Also denies polyuria, polydipsia, urinary complaints, abdominal complaints, myalgias, numbness, weakness or other related symptoms.     Patient Active Problem List    Diagnosis Date Noted   • Caregiver with fatigue 04/13/2017   • Adjustment disorder with depressed mood 04/13/2017   • Obesity (BMI 30-39.9) 04/13/2017   • Essential hypertension    • Personal history of prostate cancer    • Acquired hypothyroidism    • Type 2 diabetes mellitus with hyperglycemia, without long-term current use of insulin (CMS-[facility])    • Mixed hyperlipidemia    • Meniere's disease    • Environmental allergies    • Mild intermittent asthma without complication    • History of rheumatic fever        Health Maintenance/Immunizations:   Health Maintenance Topics with due status: Overdue       Topic Date Due    RETINAL SCREENING 03/16/1965    IMM DTaP/Tdap/Td Vaccine 10/02/2009         Current medications including changes today:  Current Outpatient Prescriptions   Medication Sig Dispense Refill   • metformin ER (GLUCOPHAGE XR) 500 MG TABLET SR 24 HR Take 2 Tabs by mouth 2 times a day. 360 Tab 3   • pravastatin (PRAVACHOL) 40 MG tablet Take 1 Tab by mouth every day. 90 Tab 1   • losartan (COZAAR) 50 MG Tab Take 1 Tab by mouth every day. 90 Tab 1   • aspirin (ASA) 81 MG Chew Tab chewable tablet Take 81 mg  "by mouth every day.     • B Complex Vitamins (VITAMIN B COMPLEX PO) Take  by mouth.     • escitalopram (LEXAPRO) 10 MG Tab Take 1 Tab by mouth every day. 30 Tab 3   • Blood Glucose Monitoring Suppl SUPPLIES Misc Test strips order: Test strips for [employer] Contour Next meter. Sig: use daily for medication adjustment.  Dx. Code E11.65 100 Strip 3   • Cholecalciferol (VITAMIN D3 PO) Take  by mouth.     • Fish Oil Oil by Does not apply route.     • Ascorbic Acid (VITAMIN C) 1000 MG Tab Take  by mouth.     • Probiotic Product (PROBIOTIC DAILY PO) Take  by mouth.     • triamterene-hydrochlorothiazide (MAXZIDE 75-50) 75-50 MG Tab Take 75 Tabs by mouth every day. 90 Tab 3   • NIFEdipine (ADALAT CC) 90 MG CR tablet Take 1 Tab by mouth every day. 90 Tab 3   • liothyronine (CYTOMEL) 5 MCG Tab Take 1 Tab by mouth every day. 90 Tab 3   • albuterol 108 (90 BASE) MCG/ACT Aero Soln inhalation aerosol Inhale 2 Puffs by mouth every 6 hours as needed for Shortness of Breath. 8.5 g 3     No current facility-administered medications for this visit.       Allergies:   Allergies   Allergen Reactions   • Lipitor [Atorvastatin] Nausea   • Lisinopril      cough   • Penicillins Anaphylaxis   • Sulfa Drugs      Chancre sores - throat        Social History   Substance Use Topics   • Smoking status: Never Smoker    • Smokeless tobacco: Never Used   • Alcohol Use: Yes      Comment: occasionally       Family History   Problem Relation Age of Onset   • Cancer Mother      lung   • Heart Disease Father    • Hypertension Father          ROS  Pertinent ROS findings as above.   All other systems reviewed and are negative except as per HPI.     Objective:     /60 mmHg  Pulse 89  Temp(Src) 36.7 °C (98 °F)  Ht 1.753 m (5' 9\")  Wt 120.657 kg (266 lb)  BMI 39.26 kg/m2  SpO2 96% Body mass index is 39.26 kg/(m^2).     Alert, oriented in no acute distress.  Eye contact is good, speech goal directed, affect calm.  HEENT: EOMI, PERRL, conjunctiva " non-injected, sclera non-icteric.  Nares patent with no significant congestion or drainage.  Maricruz pinnae, external auditory canals, TM pearly gray with normal light reflex bilaterally.  Oral mucous membranes pink and moist with no lesions or thrush.  Neck supple with no cervical lymphadenopathy, JVD, palpable thyroid nodules or carotid bruits.  Lungs: clear to auscultation bilaterally with good excursion.  CV: regular rate and rhythm.  Abdomen soft, non-distended, non-tender with normal bowel sounds. No CVAT  Lower extremities warm with no edema.  Feet are examined by RN    Lab Results   Component Value Date/Time    GLYCOHEMOGLOBIN 7.7* 03/10/2017 10:21 AM          Assessment/Plan:       1. Type 2 diabetes mellitus with hyperglycemia, without long-term current use of insulin (CMS-HCC)  Diabetic Monofilament LE Exam    POCT Retinal Eye Exam    metformin ER (GLUCOPHAGE XR) 500 MG TABLET SR 24 HR    pravastatin (PRAVACHOL) 40 MG tablet    losartan (COZAAR) 50 MG Tab    DISCONTINUED: metformin ER (GLUCOPHAGE XR) 500 MG TABLET SR 24 HR   2. Essential hypertension  losartan (COZAAR) 50 MG Tab   3. Mixed hyperlipidemia  pravastatin (PRAVACHOL) 40 MG tablet   4. Obesity (BMI 30-39.9)     5. Adjustment disorder with depressed mood         DM2 A1c is not at goal     -RN-CDE notes reviewed and discussed.  -Discussed diet, exercise, disease management and weight loss goals.   -Education and advice provided today: See RN-CDE note.    Additional education, advice, refills, and referrals per order    Follow-up:   Return in about 3 months (around 7/27/2017). sooner should new symptoms or problems arise.    The total time spent seeing the patient in consultation, and formulating an action plan for this visit was 40 minutes.  Greater than 50% of this time was spent counseling, discussing problems documented above, coordinating care and answering questions by the provider and registered nurse--certified diabetes educator.

## 2017-05-05 ENCOUNTER — NON-PROVIDER VISIT (OUTPATIENT)
Dept: HEALTH INFORMATION MANAGEMENT | Facility: MEDICAL CENTER | Age: 72
End: 2017-05-05
Payer: MEDICARE

## 2017-05-05 VITALS — BODY MASS INDEX: 39.4 KG/M2 | HEIGHT: 69 IN | WEIGHT: 266 LBS

## 2017-05-05 PROCEDURE — G0109 DIAB MANAGE TRN IND/GROUP: HCPCS | Performed by: DIETITIAN, REGISTERED

## 2017-05-05 NOTE — PROGRESS NOTES
"5/5/2017    Lilia Diego M.D.  72 y.o.   Time in/out: 9:00/11:00    Anthropometrics/Objective  Filed Vitals:    05/05/17 1603   Height: 1.753 m (5' 9.02\")   Weight: 120.657 kg (266 lb)       Body mass index is 39.26 kg/(m^2).    Stated Goal Weight: 180 lb  Estimated Caloric needs 2350  Kcal   See comprehensive patient history form for further information     Subjective:  I would like to lose 20 pounds in the next 3 months.  I intend to start walking 30 minutes 5 days a week.    Nutrition Diagnosis (PES Statement)  Problem (Nutrition diagnosis)  Clinical: Altered nutrition-related lab values, Clinical: Obese/Morbidly Obese, Behavioral/Environmental: Food/nutrition knowledge deficit and Behavioral/Environmental: Inadequate physical activity    Etiology(Addresses the cause,contributing factors)  Cardiac/endocrine/kidney/liver/neurological/pulmonary dysfunction, Food and nutrition related knowledge deficit, Excessive energy intake, Inadequate physical activity/exercise and No previous DM nutrition education    Signs/Symptoms (Address observations and stated info: subjective and objective data)  Infrequent, low-duration and /or low-intensity physical activity and Limited knowledge of CHO/PRO/FAT compostition of food and /or CHO/PRO/FAT metabolism  · BMI 39.28  · Weight loss/gain: No    Client history:  Condition(s) associated with a diagnosis or treatment (specify)     Biochemical data, medical test and procedures  Lab Results   Component Value Date/Time    GLYCOHEMOGLOBIN 7.7* 03/10/2017 10:21 AM   @  No results found for: POCGLUCOSE  Lab Results   Component Value Date/Time    CHOLESTEROL,* 04/19/2017 08:43 AM    * 04/19/2017 08:43 AM    HDL 34* 04/19/2017 08:43 AM    TRIGLYCERIDES 148 04/19/2017 08:43 AM         Nutrition Intervention  Nutrition Prescription  Recommended Daily Kcals 1900  Carb choices: 15  Protein: 6-8 oz/day  Fat grams: 60    Meal and Snack  Recommend a general/healthful diet, 3 meals " per day    Comprehensive Nutrition education Instruction or training leading to in-depth nutrition related knowledge about:  Benefits to following meal plan, Combine carb, protein and fat at each meal, Eating out, Fast food, Meal timing and spacing, Menu Planning, Metabolism of carb, protein, fat, Physical activity/exercise, Portion control, Sweets and alcohol in moderation, Heart-healthy guidelines, Increasing/Decreasing PO intake, Label Reading and Handouts provided regarding topics discussed    Monitoring & Evaluation Plan    Behavioral-Environmental:  Physical activity He intends to start walking for 30 minutes 5 days a week.    Food / Nutrient Intake:  Food intake: He seemed interested in knowing portion guidelines.    Physical Signs / Symptoms:  Lipid profiles Total cholesterol:  216, HDL: 34, LDL: 152, Triglycerides: 148  And he's allergic to one statin.    Assessment Notes:  William was very interested in class.  When he saw how much food he could have at his meals if he chooses the right kinds of foods he seemed surprised.

## 2017-05-05 NOTE — Clinical Note
May 5, 2017        Lilia Diego M.D.  [address], [address]  [place] [address]                 Dear:Lilia Diego M.D.      Your patient Milton Javed 1945 was recently seen [facility] for nutrition counseling, regarding type 2 diabetes.      Should you desire any notes from this visit please do not hesitate to give us a call at [phone].      Sincerely,  Charity Hurst RD, CDE  Certified Diabetes Educator

## 2017-05-05 NOTE — MR AVS SNAPSHOT
Milton Javed   2017 9:00 AM   Appointment   MRN: [id number]    Department:  [facility]   Dept Phone:  [phone]    Description:  Male : 1945   Provider:  TYPE 2 CLASS           Allergies as of 2017     Allergen Noted Reactions    Lipitor [Atorvastatin] 2017   Nausea    Lisinopril 2017       cough    Penicillins 2008   Anaphylaxis    Sulfa Drugs 2008       Chancre sores - throat      Vital Signs     Smoking Status                   Never Smoker            Basic Information     Date Of Birth Sex Race Ethnicity Preferred Language    1945 Male White Non- English      Your appointments     2017  2:20 PM   Established Patient with Lilia Diego M.D.   [facility]    [address]  [facility] [id number] [phone]           You will be receiving a confirmation call a few days before your appointment from our automated call confirmation system.              Problem List              ICD-10-CM Priority Class Noted - Resolved    Essential hypertension I10   Unknown - Present    Personal history of prostate cancer Z85.46   Unknown - Present    Acquired hypothyroidism E03.9   Unknown - Present    Type 2 diabetes mellitus with hyperglycemia, without long-term current use of insulin (CMS-HCC) E11.65   Unknown - Present    Mixed hyperlipidemia E78.2   Unknown - Present    Meniere's disease H81.09   Unknown - Present    Environmental allergies Z91.09   Unknown - Present    Mild intermittent asthma without complication J45.20   Unknown - Present    History of rheumatic fever Z86.79   Unknown - Present    Caregiver with fatigue R53.83   2017 - Present    Adjustment disorder with depressed mood F43.21   2017 - Present    Obesity (BMI 30-39.9) E66.9   2017 - Present      Health Maintenance        Date Due Completion Dates    IMM DTaP/Tdap/Td Vaccine (1 - Tdap) 10/2/2009 10/1/2009    IMM  ZOSTER VACCINE 3/6/2018 (Originally 3/16/2007) ---    A1C SCREENING 9/10/2017 3/10/2017, 6/7/2016, 11/8/2011, 6/15/2011, 8/10/2010, 2/26/2009, 6/16/2005    FASTING LIPID PROFILE 4/19/2018 4/19/2017, 6/7/2016, 11/8/2011, 6/15/2011, 8/10/2010, 2/26/2009, 6/16/2005    URINE ACR / MICROALBUMIN 4/19/2018 4/19/2017, 6/7/2016, 6/15/2011, 2/26/2009, 6/16/2005    SERUM CREATININE 4/19/2018 4/19/2017, 6/7/2016, 11/8/2011, 6/15/2011, 8/10/2010, 4/13/2009, 4/4/2009, 2/26/2009, 4/27/2008, 6/16/2005    RETINAL SCREENING 4/27/2018 4/27/2017    DIABETES MONOFILAMENT / LE EXAM 4/27/2018 4/27/2017    COLONOSCOPY 6/2/2018 6/2/2008 (Done)    Override on 6/2/2008: Done            Current Immunizations     13-VALENT PCV PREVNAR 9/3/2015, 8/1/2010    Influenza Vaccine Adult HD 9/30/2016    Pneumococcal polysaccharide vaccine (PPSV-23) 2/21/2017    TD Vaccine 10/1/2009, 10/1/2009      Below and/or attached are the medications your provider expects you to take. Review all of your home medications and newly ordered medications with your provider and/or pharmacist. Follow medication instructions as directed by your provider and/or pharmacist. Please keep your medication list with you and share with your provider. Update the information when medications are discontinued, doses are changed, or new medications (including over-the-counter products) are added; and carry medication information at all times in the event of emergency situations     Allergies:  LIPITOR - Nausea     LISINOPRIL - (reactions not documented)     PENICILLINS - Anaphylaxis     SULFA DRUGS - (reactions not documented)               Medications  Valid as of: May 05, 2017 - 12:19 PM    Generic Name Brand Name Tablet Size Instructions for use    Albuterol Sulfate (Aero Soln) albuterol 108 (90 BASE) MCG/ACT Inhale 2 Puffs by mouth every 6 hours as needed for Shortness of Breath.        Ascorbic Acid (Tab) Vitamin C 1000 MG Take  by mouth.        Aspirin (Chew Tab) ASA 81 MG Take  81 mg by mouth every day.        B Complex Vitamins   Take  by mouth.        Blood Glucose Monitoring Suppl (Misc) Blood Glucose Monitoring Suppl SUPPLIES Test strips order: Test strips for [employer] Contour Next meter. Sig: use daily for medication adjustment.  Dx. Code E11.65        Cholecalciferol   Take  by mouth.        Escitalopram Oxalate (Tab) LEXAPRO 10 MG Take 1 Tab by mouth every day.        Fish Oil (Oil) Fish Oil  by Does not apply route.        Liothyronine Sodium (Tab) CYTOMEL 5 MCG Take 1 Tab by mouth every day.        Losartan Potassium (Tab) COZAAR 50 MG Take 1 Tab by mouth every day.        MetFORMIN HCl (TABLET SR 24 HR) GLUCOPHAGE  MG Take 2 Tabs by mouth 2 times a day.        NIFEdipine (TABLET SR 24 HR) ADALAT CC 90 MG Take 1 Tab by mouth every day.        Pravastatin Sodium (Tab) PRAVACHOL 40 MG Take 1 Tab by mouth every day.        Probiotic Product   Take  by mouth.        Triamterene-HCTZ (Tab) MAXZIDE 75-50 75-50 MG Take 75 Tabs by mouth every day.        .                 Medicines prescribed today were sent to:     [facility] [place], [place] - [id number] [facility] [address]    Phone: [phone] Fax: [phone]    Open 24 Hours?: No      Medication refill instructions:       If your prescription bottle indicates you have medication refills left, it is not necessary to call your provider’s office. Please contact your pharmacy and they will refill your medication.    If your prescription bottle indicates you do not have any refills left, you may request refills at any time through one of the following ways: The online [employer] system (except Urgent Care), by calling your provider’s office, or by asking your pharmacy to contact your provider’s office with a refill request. Medication refills are processed only during regular business hours and may not be available until the next business day. Your provider may request additional information or to have a follow-up  visit with you prior to refilling your medication.   *Please Note: Medication refills are assigned a new Rx number when refilled electronically. Your pharmacy may indicate that no refills were authorized even though a new prescription for the same medication is available at the pharmacy. Please request the medicine by name with the pharmacy before contacting your provider for a refill.           [employer] Access Code: Activation code not generated  Current [facility] Online Status: Active

## 2017-06-06 RX ORDER — NIFEDIPINE 90 MG/1
TABLET, FILM COATED, EXTENDED RELEASE ORAL
Qty: 30 TAB | Refills: 3 | Status: SHIPPED | OUTPATIENT
Start: 2017-06-06 | End: 2017-10-02 | Stop reason: SDUPTHER

## 2017-06-06 RX ORDER — LIOTHYRONINE SODIUM 5 UG/1
TABLET ORAL
Qty: 30 TAB | Refills: 6 | Status: SHIPPED | OUTPATIENT
Start: 2017-06-06 | End: 2017-08-23

## 2017-06-16 ENCOUNTER — HOSPITAL ENCOUNTER (OUTPATIENT)
Dept: LAB | Facility: MEDICAL CENTER | Age: 72
End: 2017-06-16
Attending: UROLOGY
Payer: MEDICARE

## 2017-06-16 LAB — PSA SERPL-MCNC: 0.53 NG/ML (ref 0–4)

## 2017-06-16 PROCEDURE — 84153 ASSAY OF PSA TOTAL: CPT

## 2017-06-16 PROCEDURE — 36415 COLL VENOUS BLD VENIPUNCTURE: CPT

## 2017-07-28 DIAGNOSIS — E03.9 ACQUIRED HYPOTHYROIDISM: ICD-10-CM

## 2017-07-28 DIAGNOSIS — E11.65 TYPE 2 DIABETES MELLITUS WITH HYPERGLYCEMIA, WITHOUT LONG-TERM CURRENT USE OF INSULIN (HCC): ICD-10-CM

## 2017-07-31 NOTE — TELEPHONE ENCOUNTER
Was the patient seen in the last year in this department? Yes     Does patient have an active prescription for medications requested? No     Received Request Via: Pharmacy     Last seen: 04/27/2017 smith

## 2017-08-01 RX ORDER — ESCITALOPRAM OXALATE 10 MG/1
TABLET ORAL
Qty: 30 TAB | Refills: 3 | Status: SHIPPED | OUTPATIENT
Start: 2017-08-01 | End: 2017-12-04 | Stop reason: SDUPTHER

## 2017-08-01 RX ORDER — TRIAMTERENE AND HYDROCHLOROTHIAZIDE 75; 50 MG/1; MG/1
TABLET ORAL
Qty: 90 TAB | Refills: 3 | Status: SHIPPED | OUTPATIENT
Start: 2017-08-01 | End: 2018-07-16

## 2017-08-10 ENCOUNTER — HOSPITAL ENCOUNTER (OUTPATIENT)
Dept: LAB | Facility: MEDICAL CENTER | Age: 72
End: 2017-08-10
Attending: FAMILY MEDICINE
Payer: MEDICARE

## 2017-08-10 DIAGNOSIS — E11.65 TYPE 2 DIABETES MELLITUS WITH HYPERGLYCEMIA, WITHOUT LONG-TERM CURRENT USE OF INSULIN (HCC): ICD-10-CM

## 2017-08-10 DIAGNOSIS — E03.9 ACQUIRED HYPOTHYROIDISM: ICD-10-CM

## 2017-08-10 LAB
EST. AVERAGE GLUCOSE BLD GHB EST-MCNC: 117 MG/DL
HBA1C MFR BLD: 5.7 % (ref 0–5.6)
T4 FREE SERPL-MCNC: 0.64 NG/DL (ref 0.53–1.43)
TSH SERPL DL<=0.005 MIU/L-ACNC: 5.98 UIU/ML (ref 0.3–3.7)

## 2017-08-10 PROCEDURE — 84439 ASSAY OF FREE THYROXINE: CPT

## 2017-08-10 PROCEDURE — 84443 ASSAY THYROID STIM HORMONE: CPT

## 2017-08-10 PROCEDURE — 83036 HEMOGLOBIN GLYCOSYLATED A1C: CPT

## 2017-08-10 PROCEDURE — 36415 COLL VENOUS BLD VENIPUNCTURE: CPT

## 2017-08-23 ENCOUNTER — OFFICE VISIT (OUTPATIENT)
Dept: MEDICAL GROUP | Facility: MEDICAL CENTER | Age: 72
End: 2017-08-23
Payer: MEDICARE

## 2017-08-23 VITALS
DIASTOLIC BLOOD PRESSURE: 82 MMHG | SYSTOLIC BLOOD PRESSURE: 126 MMHG | BODY MASS INDEX: 39.1 KG/M2 | RESPIRATION RATE: 16 BRPM | WEIGHT: 264 LBS | HEART RATE: 71 BPM | HEIGHT: 69 IN | OXYGEN SATURATION: 94 % | TEMPERATURE: 97 F

## 2017-08-23 DIAGNOSIS — E78.2 MIXED HYPERLIPIDEMIA: ICD-10-CM

## 2017-08-23 DIAGNOSIS — E66.9 OBESITY (BMI 30-39.9): ICD-10-CM

## 2017-08-23 DIAGNOSIS — E11.65 TYPE 2 DIABETES MELLITUS WITH HYPERGLYCEMIA, WITHOUT LONG-TERM CURRENT USE OF INSULIN (HCC): ICD-10-CM

## 2017-08-23 DIAGNOSIS — I10 ESSENTIAL HYPERTENSION: ICD-10-CM

## 2017-08-23 DIAGNOSIS — E03.9 ACQUIRED HYPOTHYROIDISM: ICD-10-CM

## 2017-08-23 PROCEDURE — 99214 OFFICE O/P EST MOD 30 MIN: CPT | Performed by: FAMILY MEDICINE

## 2017-08-23 RX ORDER — LEVOTHYROXINE SODIUM 0.05 MG/1
50 TABLET ORAL
Qty: 30 TAB | Refills: 3 | Status: SHIPPED | OUTPATIENT
Start: 2017-08-23 | End: 2018-01-02 | Stop reason: SDUPTHER

## 2017-08-23 NOTE — MR AVS SNAPSHOT
"        Milton Javed   2017 11:40 AM   Office Visit   MRN: [id number]    Department:  [facility]   Dept Phone:  [phone]    Description:  Male : 1945   Provider:  Lilia Diego M.D.           Reason for Visit     Results lab work      Allergies as of 2017     Allergen Noted Reactions    Lipitor [Atorvastatin] 2017   Nausea    Lisinopril 2017       cough    Penicillins 2008   Anaphylaxis    Sulfa Drugs 2008       Chancre sores - throat      You were diagnosed with     Type 2 diabetes mellitus with hyperglycemia, without long-term current use of insulin (CMS-HCC)   [7557330]       Acquired hypothyroidism   [8743152]       Obesity (BMI 30-39.9)   [283480]       Essential hypertension   [5219974]       Mixed hyperlipidemia   [272.2.ICD-9-CM]         Vital Signs     Blood Pressure Pulse Temperature Respirations Height Weight    126/82 mmHg 71 36.1 °C (97 °F) 16 1.753 m (5' 9.02\") 119.75 kg (264 lb)    Body Mass Index Oxygen Saturation Smoking Status             38.97 kg/m2 94% Never Smoker          Basic Information     Date Of Birth Sex Race Ethnicity Preferred Language    1945 Male White Non- English      Problem List              ICD-10-CM Priority Class Noted - Resolved    Essential hypertension I10   Unknown - Present    Personal history of prostate cancer Z85.46   Unknown - Present    Acquired hypothyroidism E03.9   Unknown - Present    Type 2 diabetes mellitus with hyperglycemia, without long-term current use of insulin (CMS-HCC) E11.65   Unknown - Present    Mixed hyperlipidemia E78.2   Unknown - Present    Meniere's disease H81.09   Unknown - Present    Environmental allergies Z91.09   Unknown - Present    Mild intermittent asthma without complication J45.20   Unknown - Present    History of rheumatic fever Z86.79   Unknown - Present    Caregiver with fatigue R53.83   2017 - Present    Adjustment disorder with depressed mood F43.21   " 4/13/2017 - Present    Obesity (BMI 30-39.9) E66.9   4/13/2017 - Present      Health Maintenance        Date Due Completion Dates    IMM DTaP/Tdap/Td Vaccine (1 - Tdap) 10/2/2009 10/1/2009    IMM INFLUENZA (1) 9/1/2017 9/30/2016    IMM ZOSTER VACCINE 3/6/2018 (Originally 3/16/2007) ---    A1C SCREENING 2/10/2018 8/10/2017, 3/10/2017, 6/7/2016, 11/8/2011, 6/15/2011, 8/10/2010, 2/26/2009, 6/16/2005    FASTING LIPID PROFILE 4/19/2018 4/19/2017, 6/7/2016, 11/8/2011, 6/15/2011, 8/10/2010, 2/26/2009, 6/16/2005    URINE ACR / MICROALBUMIN 4/19/2018 4/19/2017, 6/7/2016, 6/15/2011, 2/26/2009, 6/16/2005    SERUM CREATININE 4/19/2018 4/19/2017, 6/7/2016, 11/8/2011, 6/15/2011, 8/10/2010, 4/13/2009, 4/4/2009, 2/26/2009, 4/27/2008, 6/16/2005    RETINAL SCREENING 4/27/2018 4/27/2017    DIABETES MONOFILAMENT / LE EXAM 4/27/2018 4/27/2017    COLONOSCOPY 6/2/2018 6/2/2008 (Done)    Override on 6/2/2008: Done            Current Immunizations     13-VALENT PCV PREVNAR 9/3/2015, 8/1/2010    Influenza Vaccine Adult HD 9/30/2016    Pneumococcal polysaccharide vaccine (PPSV-23) 2/21/2017    TD Vaccine 10/1/2009, 10/1/2009      Below and/or attached are the medications your provider expects you to take. Review all of your home medications and newly ordered medications with your provider and/or pharmacist. Follow medication instructions as directed by your provider and/or pharmacist. Please keep your medication list with you and share with your provider. Update the information when medications are discontinued, doses are changed, or new medications (including over-the-counter products) are added; and carry medication information at all times in the event of emergency situations     Allergies:  LIPITOR - Nausea     LISINOPRIL - (reactions not documented)     PENICILLINS - Anaphylaxis     SULFA DRUGS - (reactions not documented)               Medications  Valid as of: August 23, 2017 - 12:11 PM    Generic Name Brand Name Tablet Size  Instructions for use    Albuterol Sulfate (Aero Soln) albuterol 108 (90 Base) MCG/ACT Inhale 2 Puffs by mouth every 6 hours as needed for Shortness of Breath.        Ascorbic Acid (Tab) Vitamin C 1000 MG Take  by mouth.        Aspirin (Chew Tab) ASA 81 MG Take 81 mg by mouth every day.        B Complex Vitamins   Take  by mouth.        Blood Glucose Monitoring Suppl (Misc) Blood Glucose Monitoring Suppl Supplies Test strips order: Test strips for [employer] Contour Next meter. Sig: use daily for medication adjustment.  Dx. Code E11.65        Cholecalciferol   Take  by mouth.        Escitalopram Oxalate (Tab) LEXAPRO 10 MG TAKE ONE TABLET BY MOUTH EVERY DAY        Fish Oil (Oil) Fish Oil  by Does not apply route.        Levothyroxine Sodium (Tab) SYNTHROID 50 MCG Take 1 Tab by mouth Every morning on an empty stomach.        Losartan Potassium (Tab) COZAAR 50 MG Take 1 Tab by mouth every day.        MetFORMIN HCl (TABLET SR 24 HR) GLUCOPHAGE  MG Take 2 Tabs by mouth 2 times a day.        NIFEdipine (TABLET SR 24 HR) ADALAT CC 90 MG TAKE ONE TABLET BY MOUTH DAILY        Pravastatin Sodium (Tab) PRAVACHOL 40 MG Take 1 Tab by mouth every day.        Probiotic Product   Take  by mouth.        Triamterene-HCTZ (Tab) MAXZIDE 75-50 75-50 MG TAKE ONE TABLET BY MOUTH DAILY        .                 Medicines prescribed today were sent to:     [id number] - [place] - [address] [facility]    [address] [facility] [address]    Phone: [phone] Fax: [phone]    Open 24 Hours?: No      Medication refill instructions:       If your prescription bottle indicates you have medication refills left, it is not necessary to call your provider’s office. Please contact your pharmacy and they will refill your medication.    If your prescription bottle indicates you do not have any refills left, you may request refills at any time through one of the following ways: The online [employer] system (except Urgent Care), by calling your  provider’s office, or by asking your pharmacy to contact your provider’s office with a refill request. Medication refills are processed only during regular business hours and may not be available until the next business day. Your provider may request additional information or to have a follow-up visit with you prior to refilling your medication.   *Please Note: Medication refills are assigned a new Rx number when refilled electronically. Your pharmacy may indicate that no refills were authorized even though a new prescription for the same medication is available at the pharmacy. Please request the medicine by name with the pharmacy before contacting your provider for a refill.        Your To Do List     Future Labs/Procedures Complete By Expires    COMP METABOLIC PANEL  As directed 8/24/2018    FREE THYROXINE  As directed 8/24/2018    HEMOGLOBIN A1C  As directed 8/24/2018    LIPID PROFILE  As directed 8/24/2018    TSH  As directed 8/24/2018         MyChart Access Code: Activation code not generated  Current [employer] Status: Active

## 2017-08-25 NOTE — ASSESSMENT & PLAN NOTE
Dyslipidemia Chronic condition. Current treatments: diet/exercise/medicines. He is taking pravastatin 40 mg as directed. Current side effects: none.

## 2017-08-25 NOTE — ASSESSMENT & PLAN NOTE
Stable. Currently taking Metformin as directed.  Denies side effects or hypoglycemic events.  He is monitoring blood sugar regularly at home.  Reports diet is fair  He is not exercising regularly.  Last eye exam: 6 months ago  Denies polyuria, polydipsia, blurred vision, or neuropathies.

## 2017-08-25 NOTE — ASSESSMENT & PLAN NOTE
Stable. Currently taking losartan, maxzide, and nifedipine as directed.   He is taking baby aspirin daily.   He is monitoring BP at home.   Denies symptoms low BP: light-headed, tunnel-vision, unusual fatigue.   Denies symptoms high BP:pounding headache, visual changes, palpitations, flushed face.   Denies medicine side effects: unusual fatigue, slow heartbeat, foot/leg swelling, cough.
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
## 2017-08-25 NOTE — ASSESSMENT & PLAN NOTE
Currently taking Cytomel daily that was prescribed by Dr. Calero  Denies palpitations, skin changes, temperature intolerance, or changes in bowel habits . He has had some increased fatigue.

## 2017-10-03 RX ORDER — NIFEDIPINE 90 MG/1
TABLET, FILM COATED, EXTENDED RELEASE ORAL
Qty: 30 TAB | Refills: 3 | Status: SHIPPED | OUTPATIENT
Start: 2017-10-03 | End: 2018-02-02 | Stop reason: SDUPTHER

## 2017-11-02 DIAGNOSIS — I10 ESSENTIAL HYPERTENSION: ICD-10-CM

## 2017-11-02 DIAGNOSIS — E11.65 TYPE 2 DIABETES MELLITUS WITH HYPERGLYCEMIA, WITHOUT LONG-TERM CURRENT USE OF INSULIN (HCC): ICD-10-CM

## 2017-11-02 RX ORDER — LOSARTAN POTASSIUM 50 MG/1
TABLET ORAL
Qty: 90 TAB | Refills: 1 | Status: SHIPPED | OUTPATIENT
Start: 2017-11-02 | End: 2018-05-09 | Stop reason: SDUPTHER

## 2018-01-02 DIAGNOSIS — E03.9 ACQUIRED HYPOTHYROIDISM: ICD-10-CM

## 2018-01-02 RX ORDER — LEVOTHYROXINE SODIUM 0.05 MG/1
TABLET ORAL
Qty: 30 TAB | Refills: 3 | Status: SHIPPED | OUTPATIENT
Start: 2018-01-02 | End: 2018-05-12 | Stop reason: SDUPTHER

## 2018-01-11 ENCOUNTER — HOSPITAL ENCOUNTER (OUTPATIENT)
Dept: LAB | Facility: MEDICAL CENTER | Age: 73
End: 2018-01-11
Attending: FAMILY MEDICINE
Payer: MEDICARE

## 2018-01-11 DIAGNOSIS — E03.9 ACQUIRED HYPOTHYROIDISM: ICD-10-CM

## 2018-01-11 DIAGNOSIS — I10 ESSENTIAL HYPERTENSION: ICD-10-CM

## 2018-01-11 DIAGNOSIS — E11.65 TYPE 2 DIABETES MELLITUS WITH HYPERGLYCEMIA, WITHOUT LONG-TERM CURRENT USE OF INSULIN (HCC): ICD-10-CM

## 2018-01-11 DIAGNOSIS — E78.2 MIXED HYPERLIPIDEMIA: ICD-10-CM

## 2018-01-11 LAB
ALBUMIN SERPL BCP-MCNC: 3.9 G/DL (ref 3.2–4.9)
ALBUMIN/GLOB SERPL: 1.5 G/DL
ALP SERPL-CCNC: 47 U/L (ref 30–99)
ALT SERPL-CCNC: 18 U/L (ref 2–50)
ANION GAP SERPL CALC-SCNC: 11 MMOL/L (ref 0–11.9)
AST SERPL-CCNC: 13 U/L (ref 12–45)
BILIRUB SERPL-MCNC: 0.6 MG/DL (ref 0.1–1.5)
BUN SERPL-MCNC: 21 MG/DL (ref 8–22)
CALCIUM SERPL-MCNC: 9.2 MG/DL (ref 8.5–10.5)
CHLORIDE SERPL-SCNC: 105 MMOL/L (ref 96–112)
CHOLEST SERPL-MCNC: 198 MG/DL (ref 100–199)
CO2 SERPL-SCNC: 24 MMOL/L (ref 20–33)
CREAT SERPL-MCNC: 1.14 MG/DL (ref 0.5–1.4)
GLOBULIN SER CALC-MCNC: 2.6 G/DL (ref 1.9–3.5)
GLUCOSE SERPL-MCNC: 151 MG/DL (ref 65–99)
HDLC SERPL-MCNC: 36 MG/DL
LDLC SERPL CALC-MCNC: 126 MG/DL
POTASSIUM SERPL-SCNC: 3.9 MMOL/L (ref 3.6–5.5)
PROT SERPL-MCNC: 6.5 G/DL (ref 6–8.2)
SODIUM SERPL-SCNC: 140 MMOL/L (ref 135–145)
TRIGL SERPL-MCNC: 178 MG/DL (ref 0–149)

## 2018-01-11 PROCEDURE — 80053 COMPREHEN METABOLIC PANEL: CPT

## 2018-01-11 PROCEDURE — 36415 COLL VENOUS BLD VENIPUNCTURE: CPT

## 2018-01-11 PROCEDURE — 84439 ASSAY OF FREE THYROXINE: CPT

## 2018-01-11 PROCEDURE — 83036 HEMOGLOBIN GLYCOSYLATED A1C: CPT

## 2018-01-11 PROCEDURE — 80061 LIPID PANEL: CPT

## 2018-01-11 PROCEDURE — 84443 ASSAY THYROID STIM HORMONE: CPT

## 2018-01-12 LAB
EST. AVERAGE GLUCOSE BLD GHB EST-MCNC: 114 MG/DL
HBA1C MFR BLD: 5.6 % (ref 0–5.6)
T4 FREE SERPL-MCNC: 0.87 NG/DL (ref 0.53–1.43)
TSH SERPL DL<=0.005 MIU/L-ACNC: 3.45 UIU/ML (ref 0.38–5.33)

## 2018-02-02 RX ORDER — NIFEDIPINE 90 MG/1
TABLET, FILM COATED, EXTENDED RELEASE ORAL
Qty: 30 TAB | Refills: 6 | Status: SHIPPED | OUTPATIENT
Start: 2018-02-02 | End: 2018-05-10

## 2018-05-10 ENCOUNTER — APPOINTMENT (OUTPATIENT)
Dept: RADIOLOGY | Facility: MEDICAL CENTER | Age: 73
End: 2018-05-10
Attending: EMERGENCY MEDICINE
Payer: MEDICARE

## 2018-05-10 ENCOUNTER — HOSPITAL ENCOUNTER (EMERGENCY)
Facility: MEDICAL CENTER | Age: 73
End: 2018-05-10
Attending: EMERGENCY MEDICINE
Payer: MEDICARE

## 2018-05-10 VITALS
SYSTOLIC BLOOD PRESSURE: 153 MMHG | RESPIRATION RATE: 18 BRPM | WEIGHT: 267.42 LBS | OXYGEN SATURATION: 96 % | DIASTOLIC BLOOD PRESSURE: 85 MMHG | HEIGHT: 69 IN | BODY MASS INDEX: 39.61 KG/M2 | TEMPERATURE: 97.7 F | HEART RATE: 87 BPM

## 2018-05-10 DIAGNOSIS — R10.33 PERIUMBILICAL ABDOMINAL CRAMPING: ICD-10-CM

## 2018-05-10 DIAGNOSIS — K56.7 ILEUS (HCC): ICD-10-CM

## 2018-05-10 LAB
ALBUMIN SERPL BCP-MCNC: 4.9 G/DL (ref 3.2–4.9)
ALBUMIN/GLOB SERPL: 1.7 G/DL
ALP SERPL-CCNC: 46 U/L (ref 30–99)
ALT SERPL-CCNC: 22 U/L (ref 2–50)
ANION GAP SERPL CALC-SCNC: 9 MMOL/L (ref 0–11.9)
APPEARANCE UR: CLEAR
APTT PPP: 32.7 SEC (ref 24.7–36)
AST SERPL-CCNC: 20 U/L (ref 12–45)
BASOPHILS # BLD AUTO: 0.5 % (ref 0–1.8)
BASOPHILS # BLD: 0.06 K/UL (ref 0–0.12)
BILIRUB SERPL-MCNC: 0.9 MG/DL (ref 0.1–1.5)
BUN SERPL-MCNC: 24 MG/DL (ref 8–22)
CALCIUM SERPL-MCNC: 10.1 MG/DL (ref 8.4–10.2)
CHLORIDE SERPL-SCNC: 102 MMOL/L (ref 96–112)
CO2 SERPL-SCNC: 24 MMOL/L (ref 20–33)
COLOR UR: YELLOW
CREAT SERPL-MCNC: 1.34 MG/DL (ref 0.5–1.4)
EOSINOPHIL # BLD AUTO: 0.24 K/UL (ref 0–0.51)
EOSINOPHIL NFR BLD: 2.2 % (ref 0–6.9)
ERYTHROCYTE [DISTWIDTH] IN BLOOD BY AUTOMATED COUNT: 39.8 FL (ref 35.9–50)
GLOBULIN SER CALC-MCNC: 2.9 G/DL (ref 1.9–3.5)
GLUCOSE SERPL-MCNC: 152 MG/DL (ref 65–99)
GLUCOSE UR STRIP.AUTO-MCNC: NEGATIVE MG/DL
HCT VFR BLD AUTO: 43.3 % (ref 42–52)
HGB BLD-MCNC: 16.1 G/DL (ref 14–18)
IMM GRANULOCYTES # BLD AUTO: 0.04 K/UL (ref 0–0.11)
IMM GRANULOCYTES NFR BLD AUTO: 0.4 % (ref 0–0.9)
INR PPP: 1.01 (ref 0.87–1.13)
KETONES UR STRIP.AUTO-MCNC: NEGATIVE MG/DL
LACTATE BLD-SCNC: 1.5 MMOL/L (ref 0.5–2)
LEUKOCYTE ESTERASE UR QL STRIP.AUTO: NEGATIVE
LIPASE SERPL-CCNC: 29 U/L (ref 7–58)
LYMPHOCYTES # BLD AUTO: 1.79 K/UL (ref 1–4.8)
LYMPHOCYTES NFR BLD: 16.1 % (ref 22–41)
MCH RBC QN AUTO: 34.9 PG (ref 27–33)
MCHC RBC AUTO-ENTMCNC: 37.2 G/DL (ref 33.7–35.3)
MCV RBC AUTO: 93.9 FL (ref 81.4–97.8)
MONOCYTES # BLD AUTO: 0.8 K/UL (ref 0–0.85)
MONOCYTES NFR BLD AUTO: 7.2 % (ref 0–13.4)
NEUTROPHILS # BLD AUTO: 8.18 K/UL (ref 1.82–7.42)
NEUTROPHILS NFR BLD: 73.6 % (ref 44–72)
NITRITE UR QL STRIP.AUTO: NEGATIVE
NRBC # BLD AUTO: 0 K/UL
NRBC BLD-RTO: 0 /100 WBC
PH UR STRIP.AUTO: 6 [PH]
PLATELET # BLD AUTO: 264 K/UL (ref 164–446)
PMV BLD AUTO: 9.7 FL (ref 9–12.9)
POTASSIUM SERPL-SCNC: 3.9 MMOL/L (ref 3.6–5.5)
PROT SERPL-MCNC: 7.8 G/DL (ref 6–8.2)
PROT UR QL STRIP: NEGATIVE MG/DL
PROTHROMBIN TIME: 13.2 SEC (ref 12–14.6)
RBC # BLD AUTO: 4.61 M/UL (ref 4.7–6.1)
RBC UR QL AUTO: NEGATIVE
SODIUM SERPL-SCNC: 135 MMOL/L (ref 135–145)
SP GR UR STRIP.AUTO: 1.01
WBC # BLD AUTO: 11.1 K/UL (ref 4.8–10.8)

## 2018-05-10 PROCEDURE — 36415 COLL VENOUS BLD VENIPUNCTURE: CPT

## 2018-05-10 PROCEDURE — 83605 ASSAY OF LACTIC ACID: CPT

## 2018-05-10 PROCEDURE — 74175 CTA ABDOMEN W/CONTRAST: CPT

## 2018-05-10 PROCEDURE — 81002 URINALYSIS NONAUTO W/O SCOPE: CPT

## 2018-05-10 PROCEDURE — 700117 HCHG RX CONTRAST REV CODE 255: Performed by: EMERGENCY MEDICINE

## 2018-05-10 PROCEDURE — 99284 EMERGENCY DEPT VISIT MOD MDM: CPT

## 2018-05-10 PROCEDURE — 85730 THROMBOPLASTIN TIME PARTIAL: CPT

## 2018-05-10 PROCEDURE — 96374 THER/PROPH/DIAG INJ IV PUSH: CPT

## 2018-05-10 PROCEDURE — 700111 HCHG RX REV CODE 636 W/ 250 OVERRIDE (IP): Performed by: EMERGENCY MEDICINE

## 2018-05-10 PROCEDURE — 85610 PROTHROMBIN TIME: CPT

## 2018-05-10 PROCEDURE — 83690 ASSAY OF LIPASE: CPT

## 2018-05-10 PROCEDURE — 80053 COMPREHEN METABOLIC PANEL: CPT

## 2018-05-10 PROCEDURE — 85025 COMPLETE CBC W/AUTO DIFF WBC: CPT

## 2018-05-10 RX ORDER — ONDANSETRON 4 MG/1
4 TABLET, ORALLY DISINTEGRATING ORAL EVERY 6 HOURS PRN
Qty: 10 TAB | Refills: 0 | Status: SHIPPED | OUTPATIENT
Start: 2018-05-10 | End: 2019-06-19

## 2018-05-10 RX ADMIN — IOHEXOL 100 ML: 350 INJECTION, SOLUTION INTRAVENOUS at 01:52

## 2018-05-10 RX ADMIN — FENTANYL CITRATE 50 MCG: 50 INJECTION INTRAMUSCULAR; INTRAVENOUS at 01:21

## 2018-05-10 ASSESSMENT — PAIN SCALES - GENERAL
PAINLEVEL_OUTOF10: 8
PAINLEVEL_OUTOF10: 8
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
## 2018-05-10 NOTE — PROGRESS NOTES
Patient given Dc instructions, verbalized understanding, and ambulated out of ED in good condtion.

## 2018-05-10 NOTE — DISCHARGE INSTRUCTIONS
"Ileus  Introduction  Ileus is a condition in which the intestines, also called the bowels, stop working and moving correctly. If the intestines stop working, food cannot pass through to get digested. The intestines are hollow organs that digest food after the food leaves the stomach. These organs are long, muscular tubes that connect the stomach to the rectum. When ileus occurs, the muscular contractions that cause food to move through the intestines stop happening as they normally would.  Ileus can occur for various reasons. This condition is a serious problem that usually requires hospitalization. It can cause symptoms such as nausea, abdominal pain, and bloating. Ileus can last from a few hours to a few days. If the intestines stop working because of a blockage, that is a different condition that is called a bowel obstruction.  What are the causes?  This condition may be caused by:  · Surgery on the abdomen.  · An infection or inflammation in the abdomen. This includes inflammation of the lining of the abdomen (peritonitis).  · Infection or inflammation in other parts of the body, such as pneumonia or pancreatitis.  · Passage of gallstones or kidney stones.  · Damage to the nerves or blood vessels that go to the intestines.  · A collection of blood within the abdominal cavity.  · Imbalance in the salts in the blood (electrolytes).  · Injury to the brain or spinal cord.  · Medicines. Many medicines, including strong pain medicines, can cause ileus or make it worse.  What are the signs or symptoms?  Symptoms of this condition include:  · Bloating of the abdomen.  · Pain or discomfort in the abdomen.  · Poor appetite.  · Nausea and vomiting.  · Lack of normal bowel sounds, such as “growling\" in the stomach.  How is this diagnosed?  This condition may be diagnosed with:  · A physical exam and medical history.  · X-rays or a CT scan of the abdomen.  You may also have other tests to help find the cause of the " condition.  How is this treated?  Treatment for this condition may include:  · Resting the intestines until they start to work again. This is often done by:  ¨ Stopping oral intake of food and drink. You will be given fluid through an IV tube to prevent dehydration.  ¨ Placing a small tube (nasogastric tube or NG tube) that is passed through your nose and into your stomach. The tube is attached to a suction device and keeps the stomach emptied out. This allows the bowels to rest and also helps to reduce nausea and vomiting.  · Correcting any electrolyte imbalance by giving supplements in the IV fluid.  · Stopping any medicines that might make ileus worse.  · Treating any condition that may have caused ileus.  Follow these instructions at home:  · Follow instructions from your health care provider about diet and fluid intake. Usually, you will be told to:  ¨ Drink plenty of clear fluids.  ¨ Avoid alcohol.  ¨ Avoid caffeine.  ¨ Eat a bland diet.  · Get plenty of rest. Return to your normal activities as told by your health care provider.  · Take over-the-counter and prescription medicines only as told by your health care provider.  · Keep all follow-up visits as told by your health care provider. This is important.  Contact a health care provider if:  · You have nausea, vomiting, or abdominal discomfort.  · You have a fever.  Get help right away if:  · You have severe abdominal pain or bloating.  · You cannot eat or drink without vomiting.  This information is not intended to replace advice given to you by your health care provider. Make sure you discuss any questions you have with your health care provider.  Document Released: 12/20/2004 Document Revised: 05/25/2017 Document Reviewed: 02/11/2016  © 2017 [employer]

## 2018-05-11 ENCOUNTER — PATIENT OUTREACH (OUTPATIENT)
Dept: HEALTH INFORMATION MANAGEMENT | Facility: OTHER | Age: 73
End: 2018-05-11

## 2018-05-20 DIAGNOSIS — E11.65 TYPE 2 DIABETES MELLITUS WITH HYPERGLYCEMIA, WITHOUT LONG-TERM CURRENT USE OF INSULIN (HCC): ICD-10-CM

## 2018-05-21 RX ORDER — METFORMIN HYDROCHLORIDE 500 MG/1
TABLET, EXTENDED RELEASE ORAL
Qty: 360 TAB | Refills: 3 | Status: SHIPPED | OUTPATIENT
Start: 2018-05-21 | End: 2019-06-19 | Stop reason: SDUPTHER

## 2018-05-30 ENCOUNTER — TELEPHONE (OUTPATIENT)
Dept: MEDICAL GROUP | Facility: MEDICAL CENTER | Age: 73
End: 2018-05-30

## 2018-05-30 DIAGNOSIS — E11.9 CONTROLLED TYPE 2 DIABETES MELLITUS WITHOUT COMPLICATION, WITHOUT LONG-TERM CURRENT USE OF INSULIN (HCC): ICD-10-CM

## 2018-05-30 NOTE — PROGRESS NOTES
1. Attempt #:FINAL   2. HealthConnect Verified: yes    3. Verify PCP: yes    4. Care Team Updated:       •   DME Company (gait device, O2, CPAP, etc.): NO       •   Other Specialists (eye doctor, derm, GYN, cardiology, endo, etc): NO    5.  Reviewed/Updated the following with patient:       •   Communication Preference Obtained? YES       •   Preferred Pharmacy? YES       •   Preferred Lab? YES       •   Family History (document living status of immediate family members and if + hx of cancer, diabetes, hypertension, hyperlipidemia, heart attack, stroke) YES. Was Abstract Encounter opened and chart updated? NO    6. [employer] Activation: already active    7. [employer] Martha: yes    8. Annual Wellness Visit Scheduling  Scheduling Status:Scheduled      9. Care Gap Scheduling (Attempt to Schedule EACH Overdue Care Gap!)     Health Maintenance Due   Topic Date Due   • IMM DTaP/Tdap/Td Vaccine (1 - Tdap) 10/02/2009 PT DECLINED    • Annual Wellness Visit  04/14/2018 SCHEDULED    • URINE ACR / MICROALBUMIN  04/19/2018 ORDERS SENT TO PCP   • RETINAL SCREENING  04/27/2018 SCHEDULED     • DIABETES MONOFILAMENT / LE EXAM  04/27/2018SCHEDULED    • COLONOSCOPY  06/02/2018NOT DUE        Scheduled patient for Annual Wellness Visit, Diabetes Monofilament Exam, Lab work and Retinal Eye Exam    10. Patient was advised: “This is a free wellness visit. The provider will screen for medical conditions to help you stay healthy. If you have other concerns to address you may be asked to discuss these at a separate visit or there may be an additional fee.”     11. Patient was informed to arrive 15 min prior to their scheduled appointment and bring in their medication bottles.

## 2018-07-10 ENCOUNTER — TELEPHONE (OUTPATIENT)
Dept: MEDICAL GROUP | Facility: MEDICAL CENTER | Age: 73
End: 2018-07-10

## 2018-07-10 RX ORDER — ESCITALOPRAM OXALATE 10 MG/1
TABLET ORAL
Qty: 30 TAB | Refills: 1 | Status: SHIPPED | OUTPATIENT
Start: 2018-07-10 | End: 2018-09-09 | Stop reason: SDUPTHER

## 2018-07-10 NOTE — TELEPHONE ENCOUNTER
ANNUAL WELLNESS VISIT PRE-VISIT PLANNING WITHOUT OUTREACH    1.  Reviewed note from last office visit with PCP: YES    2.  If any orders were placed at last visit, do we have Results/Consult Notes?        •  Labs - Labs ordered, completed on 1/11/2018 and results are in chart.  Note: If patient appointment is for lab review and patient did not complete labs, check with provider if OK to reschedule patient until labs completed.       •  Imaging - Imaging was not ordered at last office visit.       •  Referrals - No referrals were ordered at last office visit.    3.  Immunizations were updated in Epic using WebIZ?: Epic matches WebIZ       •  WebIZ Recommendations: TDAP and SHINGRIX (Shingles)        •  Is patient due for Tdap? YES. Patient was not notified of copay/out of pocket cost.       •  Is patient due for Shingles? YES. Patient was not notified of copay/out of pocket cost.     4.  Patient is due for the following Health Maintenance Topics:   Health Maintenance Due   Topic Date Due   • IMM DTaP/Tdap/Td Vaccine (1 - Tdap) 10/02/2009   • Annual Wellness Visit  04/14/2018   • COLONOSCOPY  06/02/2018       5.  Reviewed/Updated the following with patient:       •   Preferred Pharmacy? YES       •   Preferred Lab? YES       •   Preferred Communication? YES       •   Allergies? YES       •   Medications? YES. Was Abstract Encounter opened and chart updated? YES       •   Social History? YES. Was Abstract Encounter opened and chart updated? YES       •   Family History (document living status of immediate family members and if + hx of  cancer, diabetes, hypertension, hyperlipidemia, heart attack, stroke) YES. Was Abstract Encounter opened and chart updated? YES    6.  Care Team Updated:       •   DME Company (gait device, O2, CPAP, etc.): N\A       •   Other Specialists (eye doctor, derm, GYN, cardiology, endo, etc): YES    7.  Patient has the following Care Path diagnoses on Problem List:  DIABETES    Has patient ever  had diabetes education? Yes, and is NOT interested in more at this time.      8.  MDX printed and highlighted for Provider? YES    9.  Patient was advised: “This is a free wellness visit. The provider will screen for medical conditions to help you stay healthy. If you have other concerns to address you may be asked to discuss these at a separate visit or there may be an additional fee.”     10.  Patient was informed to arrive 15 min prior to their scheduled appointment and bring in their medication bottles.

## 2018-07-12 NOTE — TELEPHONE ENCOUNTER
Left message for patient to call back regarding pre-visit planning. Please transfer call to [phone].

## 2018-07-16 ENCOUNTER — OFFICE VISIT (OUTPATIENT)
Dept: MEDICAL GROUP | Facility: MEDICAL CENTER | Age: 73
End: 2018-07-16
Payer: MEDICARE

## 2018-07-16 VITALS
WEIGHT: 266 LBS | HEART RATE: 79 BPM | SYSTOLIC BLOOD PRESSURE: 150 MMHG | OXYGEN SATURATION: 97 % | TEMPERATURE: 97.2 F | HEIGHT: 69 IN | BODY MASS INDEX: 39.4 KG/M2 | DIASTOLIC BLOOD PRESSURE: 80 MMHG

## 2018-07-16 DIAGNOSIS — Z86.79 HISTORY OF RHEUMATIC FEVER: ICD-10-CM

## 2018-07-16 DIAGNOSIS — H81.03 MENIERE'S DISEASE OF BOTH EARS: ICD-10-CM

## 2018-07-16 DIAGNOSIS — Z85.46 PERSONAL HISTORY OF PROSTATE CANCER: ICD-10-CM

## 2018-07-16 DIAGNOSIS — E03.9 ACQUIRED HYPOTHYROIDISM: ICD-10-CM

## 2018-07-16 DIAGNOSIS — Z12.11 SCREENING FOR COLON CANCER: ICD-10-CM

## 2018-07-16 DIAGNOSIS — I10 ESSENTIAL HYPERTENSION: ICD-10-CM

## 2018-07-16 DIAGNOSIS — E66.9 OBESITY (BMI 30-39.9): ICD-10-CM

## 2018-07-16 DIAGNOSIS — E11.65 TYPE 2 DIABETES MELLITUS WITH HYPERGLYCEMIA, WITHOUT LONG-TERM CURRENT USE OF INSULIN (HCC): ICD-10-CM

## 2018-07-16 DIAGNOSIS — F43.21 ADJUSTMENT DISORDER WITH DEPRESSED MOOD: ICD-10-CM

## 2018-07-16 DIAGNOSIS — J45.20 MILD INTERMITTENT ASTHMA WITHOUT COMPLICATION: ICD-10-CM

## 2018-07-16 DIAGNOSIS — Z00.00 MEDICARE ANNUAL WELLNESS VISIT, SUBSEQUENT: ICD-10-CM

## 2018-07-16 DIAGNOSIS — Z91.09 ENVIRONMENTAL ALLERGIES: ICD-10-CM

## 2018-07-16 DIAGNOSIS — I71.21 ANEURYSM OF ASCENDING AORTA (HCC): ICD-10-CM

## 2018-07-16 DIAGNOSIS — E78.2 MIXED HYPERLIPIDEMIA: ICD-10-CM

## 2018-07-16 PROCEDURE — G0439 PPPS, SUBSEQ VISIT: HCPCS | Performed by: FAMILY MEDICINE

## 2018-07-16 RX ORDER — TRIAMTERENE AND HYDROCHLOROTHIAZIDE 37.5; 25 MG/1; MG/1
1 TABLET ORAL DAILY
Qty: 30 TAB | Refills: 3 | Status: SHIPPED | OUTPATIENT
Start: 2018-07-16 | End: 2018-09-13

## 2018-07-16 ASSESSMENT — ENCOUNTER SYMPTOMS: GENERAL WELL-BEING: FAIR

## 2018-07-16 ASSESSMENT — ACTIVITIES OF DAILY LIVING (ADL): BATHING_REQUIRES_ASSISTANCE: 0

## 2018-07-16 ASSESSMENT — PATIENT HEALTH QUESTIONNAIRE - PHQ9: CLINICAL INTERPRETATION OF PHQ2 SCORE: 0

## 2018-07-16 ASSESSMENT — PAIN SCALES - GENERAL: PAINLEVEL: NO PAIN

## 2018-07-16 NOTE — ASSESSMENT & PLAN NOTE
Had spinal meningitis in the 80's and he lost hearing with that.  The hearing improved but he developed the dizziness.

## 2018-07-16 NOTE — ASSESSMENT & PLAN NOTE
Stable. Currently taking nifedipine, losartan and maxzide 75-50 mg as directed.   He is taking baby aspirin daily.   He is monitoring BP at home.   Reports symptoms low BP: light-headed, tunnel-vision, unusual fatigue.   When he checks his BP his diastolic is 55  Denies symptoms high BP:pounding headache, visual changes, palpitations, flushed face.   Denies medicine side effects: unusual fatigue, slow heartbeat, foot/leg swelling, cough.    He has had several falls when gets dizzy with the most recent 2 weeks ago standing on a step ladder in the garage.
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
## 2018-07-16 NOTE — PATIENT INSTRUCTIONS
Orthostatic Hypotension  Orthostatic hypotension is a sudden drop in blood pressure that happens when you quickly change positions, such as when you get up from a seated or lying position. Blood pressure is a measurement of how strongly, or weakly, your blood is pressing against the walls of your arteries. Arteries are blood vessels that carry blood from your heart throughout your body. When blood pressure is too low, you may not get enough blood to your brain or to the rest of your organs. This can cause weakness, light-headedness, rapid heartbeat, and fainting. This can last for just a few seconds or for up to a few minutes.  Orthostatic hypotension is usually not a serious problem. However, if it happens frequently or gets worse, it may be a sign of something more serious.  What are the causes?  This condition may be caused by:  · Sudden changes in posture, such as standing up quickly after you have been sitting or lying down.  · Blood loss.  · Loss of body fluids (dehydration).  · Heart problems.  · Hormone (endocrine) problems.  · Pregnancy.  · Severe infection.  · Lack of certain nutrients.  · Severe allergic reactions (anaphylaxis).  · Certain medicines, such as blood pressure medicine or medicines that make the body lose excess fluids (diuretics). Sometimes, this condition can be caused by not taking medicine as directed, such as taking too much of a certain medicine.  What increases the risk?  Certain factors can make you more likely to develop orthostatic hypotension, including:  · Age. Risk increases as you get older.  · Conditions that affect the heart or the central nervous system.  · Taking certain medicines, such as blood pressure medicine or diuretics.  · Being pregnant.  What are the signs or symptoms?  Symptoms of this condition may include:  · Weakness.  · Light-headedness.  · Dizziness.  · Blurred vision.  · Fatigue.  · Rapid heartbeat.  · Fainting, in severe cases.  How is this diagnosed?  This  "condition is diagnosed based on:  · Your medical history.  · Your symptoms.  · Your blood pressure measurement. Your health care provider will check your blood pressure when you are:  ¨ Lying down.  ¨ Sitting.  ¨ Standing.  A blood pressure reading is recorded as two numbers, such as \"120 over 80\" (or 120/80). The first (\"top\") number is called the systolic pressure. It is a measure of the pressure in your arteries as your heart beats. The second (\"bottom\") number is called the diastolic pressure. It is a measure of the pressure in your arteries when your heart relaxes between beats. Blood pressure is measured in a unit called mm Hg. Healthy blood pressure for adults is 120/80. If your blood pressure is below 90/60, you may be diagnosed with hypotension.  Other information or tests that may be used to diagnose orthostatic hypotension include:  · Your other vital signs, such as your heart rate and temperature.  · Blood tests.  · Tilt table test. For this test, you will be safely secured to a table that moves you from a lying position to an upright position. Your heart rhythm and blood pressure will be monitored during the test.  How is this treated?  Treatment for this condition may include:  · Changing your diet. This may involve eating more salt (sodium) or drinking more water.  · Taking medicines to raise your blood pressure.  · Changing the dosage of certain medicines you are taking that might be lowering your blood pressure.  · Wearing compression stockings. These stockings help to prevent blood clots and reduce swelling in your legs.  In some cases, you may need to go to the hospital for:  · Fluid replacement. This means you will receive fluids through an IV tube.  · Blood replacement. This means you will receive donated blood through an IV tube (transfusion).  · Treating an infection or heart problems, if this applies.  · Monitoring. You may need to be monitored while medicines that you are taking wear " off.  Follow these instructions at home:  Eating and drinking  · Drink enough fluid to keep your urine clear or pale yellow.  · Eat a healthy diet and follow instructions from your health care provider about eating or drinking restrictions. A healthy diet includes:  ¨ Fresh fruits and vegetables.  ¨ Whole grains.  ¨ Lean meats.  ¨ Low-fat dairy products.  · Eat extra salt only as directed. Do not add extra salt to your diet unless your health care provider told you to do that.  · Eat frequent, small meals.  · Avoid standing up suddenly after eating.  Medicines  · Take over-the-counter and prescription medicines only as told by your health care provider.  ¨ Follow instructions from your health care provider about changing the dosage of your current medicines, if this applies.  ¨ Do not stop or adjust any of your medicines on your own.  General instructions  · Wear compression stockings as told by your health care provider.  · Get up slowly from lying down or sitting positions. This gives your blood pressure a chance to adjust.  · Avoid hot showers and excessive heat as directed by your health care provider.  · Return to your normal activities as told by your health care provider. Ask your health care provider what activities are safe for you.  · Do not use any products that contain nicotine or tobacco, such as cigarettes and e-cigarettes. If you need help quitting, ask your health care provider.  · Keep all follow-up visits as told by your health care provider. This is important.  Contact a health care provider if:  · You vomit.  · You have diarrhea.  · You have a fever for more than 2-3 days.  · You feel more thirsty than usual.  · You feel weak and tired.  Get help right away if:  · You have chest pain.  · You have a fast or irregular heartbeat.  · You develop numbness in any part of your body.  · You cannot move your arms or your legs.  · You have trouble speaking.  · You become sweaty or feel lightheaded.  · You  faint.  · You feel short of breath.  · You have trouble staying awake.  · You feel confused.  This information is not intended to replace advice given to you by your health care provider. Make sure you discuss any questions you have with your health care provider.  Document Released: 12/08/2003 Document Revised: 09/05/2017 Document Reviewed: 06/09/2017  [employer] Interactive Patient Education © 2017 [employer]

## 2018-07-18 PROBLEM — I71.21 ANEURYSM OF ASCENDING AORTA (HCC): Status: ACTIVE | Noted: 2018-07-18

## 2018-07-18 NOTE — ASSESSMENT & PLAN NOTE
Patient had a recent CT scan of the chest in the emergency room and was found to have a 4.1 ascending aortic aneurysm.  He has not not had any recurrence of chest pain.  He has followed up with the vascular surgeon and is going to get ultrasounds every 6 months.

## 2018-08-13 ENCOUNTER — HOSPITAL ENCOUNTER (OUTPATIENT)
Dept: LAB | Facility: MEDICAL CENTER | Age: 73
End: 2018-08-13
Attending: UROLOGY
Payer: MEDICARE

## 2018-08-13 LAB — PSA SERPL-MCNC: 0.45 NG/ML (ref 0–4)

## 2018-08-13 PROCEDURE — 36415 COLL VENOUS BLD VENIPUNCTURE: CPT

## 2018-08-13 PROCEDURE — 84153 ASSAY OF PSA TOTAL: CPT

## 2018-09-13 RX ORDER — TRIAMTERENE AND HYDROCHLOROTHIAZIDE 75; 50 MG/1; MG/1
TABLET ORAL
Qty: 90 TAB | Refills: 3 | Status: SHIPPED | OUTPATIENT
Start: 2018-09-13 | End: 2019-11-04 | Stop reason: SDUPTHER

## 2018-09-13 RX ORDER — ESCITALOPRAM OXALATE 10 MG/1
TABLET ORAL
Qty: 90 TAB | Refills: 3 | Status: SHIPPED | OUTPATIENT
Start: 2018-09-13 | End: 2019-06-19 | Stop reason: SDUPTHER

## 2019-03-31 ENCOUNTER — APPOINTMENT (OUTPATIENT)
Dept: RADIOLOGY | Facility: MEDICAL CENTER | Age: 74
End: 2019-03-31
Attending: EMERGENCY MEDICINE
Payer: MEDICARE

## 2019-03-31 ENCOUNTER — HOSPITAL ENCOUNTER (EMERGENCY)
Facility: MEDICAL CENTER | Age: 74
End: 2019-03-31
Attending: EMERGENCY MEDICINE
Payer: MEDICARE

## 2019-03-31 VITALS
DIASTOLIC BLOOD PRESSURE: 74 MMHG | RESPIRATION RATE: 22 BRPM | HEART RATE: 77 BPM | BODY MASS INDEX: 41.14 KG/M2 | SYSTOLIC BLOOD PRESSURE: 149 MMHG | HEIGHT: 69 IN | WEIGHT: 277.78 LBS | TEMPERATURE: 97.6 F | OXYGEN SATURATION: 95 %

## 2019-03-31 DIAGNOSIS — M75.51 BURSITIS OF RIGHT SHOULDER: ICD-10-CM

## 2019-03-31 DIAGNOSIS — M25.511 ACUTE PAIN OF RIGHT SHOULDER: ICD-10-CM

## 2019-03-31 PROCEDURE — 99284 EMERGENCY DEPT VISIT MOD MDM: CPT

## 2019-03-31 PROCEDURE — 700111 HCHG RX REV CODE 636 W/ 250 OVERRIDE (IP): Performed by: EMERGENCY MEDICINE

## 2019-03-31 PROCEDURE — 73030 X-RAY EXAM OF SHOULDER: CPT | Mod: RT

## 2019-03-31 PROCEDURE — 96372 THER/PROPH/DIAG INJ SC/IM: CPT

## 2019-03-31 RX ORDER — IBUPROFEN 600 MG/1
600 TABLET ORAL EVERY 8 HOURS PRN
Qty: 20 TAB | Refills: 0 | Status: SHIPPED | OUTPATIENT
Start: 2019-03-31 | End: 2021-10-14

## 2019-03-31 RX ORDER — KETOROLAC TROMETHAMINE 30 MG/ML
60 INJECTION, SOLUTION INTRAMUSCULAR; INTRAVENOUS ONCE
Status: COMPLETED | OUTPATIENT
Start: 2019-03-31 | End: 2019-03-31

## 2019-03-31 RX ORDER — HYDROCODONE BITARTRATE AND ACETAMINOPHEN 5; 325 MG/1; MG/1
1 TABLET ORAL EVERY 6 HOURS PRN
Qty: 10 TAB | Refills: 0 | Status: SHIPPED | OUTPATIENT
Start: 2019-03-31 | End: 2019-04-04

## 2019-03-31 RX ADMIN — KETOROLAC TROMETHAMINE 60 MG: 30 INJECTION, SOLUTION INTRAMUSCULAR; INTRAVENOUS at 14:30

## 2019-03-31 ASSESSMENT — PAIN DESCRIPTION - DESCRIPTORS: DESCRIPTORS: ACHING

## 2019-03-31 ASSESSMENT — PAIN SCALES - WONG BAKER: WONGBAKER_NUMERICALRESPONSE: HURTS AS MUCH AS POSSIBLE

## 2019-03-31 NOTE — ED NOTES
Pt cleared for d/c  dischg instructions given to pt  Verbally understands  Pt refusing norco RX and will be destroyed per his request   To f/u w/ PCP as needed  Will fill motrin RX that he was given

## 2019-03-31 NOTE — ED NOTES
Back from XR  Increased pain and discomfort noted  Continues to hold arm and moan out loud  Will inform the MD
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
## 2019-04-01 ENCOUNTER — TELEPHONE (OUTPATIENT)
Dept: SCHEDULING | Facility: IMAGING CENTER | Age: 74
End: 2019-04-01

## 2019-04-01 ENCOUNTER — APPOINTMENT (OUTPATIENT)
Dept: RADIOLOGY | Facility: MEDICAL CENTER | Age: 74
End: 2019-04-01
Attending: EMERGENCY MEDICINE
Payer: MEDICARE

## 2019-04-01 ENCOUNTER — HOSPITAL ENCOUNTER (EMERGENCY)
Facility: MEDICAL CENTER | Age: 74
End: 2019-04-01
Attending: EMERGENCY MEDICINE
Payer: MEDICARE

## 2019-04-01 VITALS
BODY MASS INDEX: 41.37 KG/M2 | DIASTOLIC BLOOD PRESSURE: 85 MMHG | HEART RATE: 74 BPM | SYSTOLIC BLOOD PRESSURE: 170 MMHG | TEMPERATURE: 97.8 F | RESPIRATION RATE: 19 BRPM | OXYGEN SATURATION: 92 % | HEIGHT: 69 IN | WEIGHT: 279.32 LBS

## 2019-04-01 DIAGNOSIS — M54.10 RADICULOPATHY, UNSPECIFIED SPINAL REGION: ICD-10-CM

## 2019-04-01 DIAGNOSIS — I15.9 SECONDARY HYPERTENSION: ICD-10-CM

## 2019-04-01 PROCEDURE — 700111 HCHG RX REV CODE 636 W/ 250 OVERRIDE (IP): Performed by: EMERGENCY MEDICINE

## 2019-04-01 PROCEDURE — 96374 THER/PROPH/DIAG INJ IV PUSH: CPT

## 2019-04-01 PROCEDURE — 96376 TX/PRO/DX INJ SAME DRUG ADON: CPT

## 2019-04-01 PROCEDURE — 72141 MRI NECK SPINE W/O DYE: CPT

## 2019-04-01 PROCEDURE — 99284 EMERGENCY DEPT VISIT MOD MDM: CPT

## 2019-04-01 PROCEDURE — 96375 TX/PRO/DX INJ NEW DRUG ADDON: CPT

## 2019-04-01 PROCEDURE — 72125 CT NECK SPINE W/O DYE: CPT

## 2019-04-01 RX ORDER — LORAZEPAM 2 MG/ML
1 INJECTION INTRAMUSCULAR ONCE
Status: COMPLETED | OUTPATIENT
Start: 2019-04-01 | End: 2019-04-01

## 2019-04-01 RX ORDER — METHYLPREDNISOLONE 4 MG/1
TABLET ORAL
Qty: 1 KIT | Refills: 0 | Status: SHIPPED | OUTPATIENT
Start: 2019-04-01 | End: 2020-08-13

## 2019-04-01 RX ORDER — CYCLOBENZAPRINE HCL 10 MG
10 TABLET ORAL 3 TIMES DAILY PRN
Qty: 15 TAB | Refills: 0 | Status: SHIPPED | OUTPATIENT
Start: 2019-04-01 | End: 2020-08-13

## 2019-04-01 RX ORDER — MORPHINE SULFATE 4 MG/ML
4 INJECTION, SOLUTION INTRAMUSCULAR; INTRAVENOUS ONCE
Status: COMPLETED | OUTPATIENT
Start: 2019-04-01 | End: 2019-04-01

## 2019-04-01 RX ORDER — ONDANSETRON 2 MG/ML
4 INJECTION INTRAMUSCULAR; INTRAVENOUS ONCE
Status: COMPLETED | OUTPATIENT
Start: 2019-04-01 | End: 2019-04-01

## 2019-04-01 RX ORDER — OXYCODONE AND ACETAMINOPHEN 10; 325 MG/1; MG/1
1 TABLET ORAL EVERY 4 HOURS PRN
Qty: 15 TAB | Refills: 0 | Status: SHIPPED | OUTPATIENT
Start: 2019-04-01 | End: 2019-04-06

## 2019-04-01 RX ADMIN — LORAZEPAM 1 MG: 2 INJECTION INTRAMUSCULAR; INTRAVENOUS at 17:37

## 2019-04-01 RX ADMIN — MORPHINE SULFATE 4 MG: 4 INJECTION INTRAVENOUS at 15:17

## 2019-04-01 RX ADMIN — MORPHINE SULFATE 4 MG: 4 INJECTION INTRAVENOUS at 17:45

## 2019-04-01 RX ADMIN — ONDANSETRON 4 MG: 2 INJECTION INTRAMUSCULAR; INTRAVENOUS at 15:17
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
## 2019-04-02 NOTE — ED NOTES
Pt given written and oral discharge instructions. Pt verbalized understanding of all instructions given. All questions answered. VSS. IV removed. Pt given f/u instructions and educated on s/s of when to return to the ER. Pt given paper prescriptions and educated on use. Pt signed controlled substance informed consent form. Pt reminded not to drive as he has received morphine here in the ER today. Pt verbalized understanding. Pt ambulating independently upon time of discharge in stable condition.

## 2019-04-03 DIAGNOSIS — M50.10 CERVICAL DISC PROLAPSE WITH RADICULOPATHY: ICD-10-CM

## 2019-06-13 ENCOUNTER — TELEPHONE (OUTPATIENT)
Dept: MEDICAL GROUP | Facility: LAB | Age: 74
End: 2019-06-13

## 2019-06-13 NOTE — TELEPHONE ENCOUNTER
Returning pt's voicemail   He has a upcoming appt and was asking if he would need labs. Dr Diego will go over this at his visit.

## 2019-06-19 ENCOUNTER — HOSPITAL ENCOUNTER (OUTPATIENT)
Dept: LAB | Facility: MEDICAL CENTER | Age: 74
End: 2019-06-19
Attending: FAMILY MEDICINE
Payer: MEDICARE

## 2019-06-19 ENCOUNTER — OFFICE VISIT (OUTPATIENT)
Dept: MEDICAL GROUP | Facility: LAB | Age: 74
End: 2019-06-19
Payer: MEDICARE

## 2019-06-19 VITALS
HEART RATE: 71 BPM | TEMPERATURE: 97.5 F | WEIGHT: 279.4 LBS | HEIGHT: 69 IN | BODY MASS INDEX: 41.38 KG/M2 | DIASTOLIC BLOOD PRESSURE: 72 MMHG | SYSTOLIC BLOOD PRESSURE: 136 MMHG | OXYGEN SATURATION: 96 %

## 2019-06-19 DIAGNOSIS — E03.9 ACQUIRED HYPOTHYROIDISM: ICD-10-CM

## 2019-06-19 DIAGNOSIS — J45.20 MILD INTERMITTENT ASTHMA WITHOUT COMPLICATION: ICD-10-CM

## 2019-06-19 DIAGNOSIS — F32.1 CURRENT MODERATE EPISODE OF MAJOR DEPRESSIVE DISORDER WITHOUT PRIOR EPISODE (HCC): ICD-10-CM

## 2019-06-19 DIAGNOSIS — Z78.9 STATIN INTOLERANCE: ICD-10-CM

## 2019-06-19 DIAGNOSIS — Z12.11 SCREENING FOR COLON CANCER: ICD-10-CM

## 2019-06-19 DIAGNOSIS — L60.8 TOENAIL DEFORMITY: ICD-10-CM

## 2019-06-19 DIAGNOSIS — E78.2 MIXED HYPERLIPIDEMIA: ICD-10-CM

## 2019-06-19 DIAGNOSIS — Z85.46 PERSONAL HISTORY OF PROSTATE CANCER: ICD-10-CM

## 2019-06-19 DIAGNOSIS — Z13.0 SCREENING FOR DEFICIENCY ANEMIA: ICD-10-CM

## 2019-06-19 DIAGNOSIS — E11.65 TYPE 2 DIABETES MELLITUS WITH HYPERGLYCEMIA, WITHOUT LONG-TERM CURRENT USE OF INSULIN (HCC): ICD-10-CM

## 2019-06-19 DIAGNOSIS — R53.83 CAREGIVER WITH FATIGUE: ICD-10-CM

## 2019-06-19 DIAGNOSIS — M50.121 CERVICAL DISC DISORDER AT C4-C5 LEVEL WITH RADICULOPATHY: ICD-10-CM

## 2019-06-19 DIAGNOSIS — I71.21 ANEURYSM OF ASCENDING AORTA (HCC): ICD-10-CM

## 2019-06-19 DIAGNOSIS — I10 ESSENTIAL HYPERTENSION: ICD-10-CM

## 2019-06-19 DIAGNOSIS — E66.01 CLASS 3 SEVERE OBESITY WITHOUT SERIOUS COMORBIDITY WITH BODY MASS INDEX (BMI) OF 40.0 TO 44.9 IN ADULT, UNSPECIFIED OBESITY TYPE (HCC): ICD-10-CM

## 2019-06-19 DIAGNOSIS — Z11.59 NEED FOR HEPATITIS C SCREENING TEST: ICD-10-CM

## 2019-06-19 DIAGNOSIS — Z91.09 ENVIRONMENTAL ALLERGIES: ICD-10-CM

## 2019-06-19 DIAGNOSIS — H81.03 MENIERE'S DISEASE OF BOTH EARS: ICD-10-CM

## 2019-06-19 PROBLEM — E66.813 CLASS 3 SEVERE OBESITY WITHOUT SERIOUS COMORBIDITY WITH BODY MASS INDEX (BMI) OF 40.0 TO 44.9 IN ADULT (HCC): Status: ACTIVE | Noted: 2017-04-13

## 2019-06-19 PROBLEM — M48.02 CERVICAL SPINAL STENOSIS: Status: ACTIVE | Noted: 2019-06-19

## 2019-06-19 LAB
ALBUMIN SERPL BCP-MCNC: 4.2 G/DL (ref 3.2–4.9)
ALBUMIN/GLOB SERPL: 1.7 G/DL
ALP SERPL-CCNC: 47 U/L (ref 30–99)
ALT SERPL-CCNC: 18 U/L (ref 2–50)
ANION GAP SERPL CALC-SCNC: 11 MMOL/L (ref 0–11.9)
AST SERPL-CCNC: 15 U/L (ref 12–45)
BASOPHILS # BLD AUTO: 0.8 % (ref 0–1.8)
BASOPHILS # BLD: 0.04 K/UL (ref 0–0.12)
BILIRUB SERPL-MCNC: 0.5 MG/DL (ref 0.1–1.5)
BUN SERPL-MCNC: 22 MG/DL (ref 8–22)
CALCIUM SERPL-MCNC: 9.3 MG/DL (ref 8.5–10.5)
CHLORIDE SERPL-SCNC: 108 MMOL/L (ref 96–112)
CHOLEST SERPL-MCNC: 194 MG/DL (ref 100–199)
CO2 SERPL-SCNC: 21 MMOL/L (ref 20–33)
CREAT SERPL-MCNC: 1.24 MG/DL (ref 0.5–1.4)
CREAT UR-MCNC: 72.4 MG/DL
EOSINOPHIL # BLD AUTO: 0.39 K/UL (ref 0–0.51)
EOSINOPHIL NFR BLD: 8 % (ref 0–6.9)
ERYTHROCYTE [DISTWIDTH] IN BLOOD BY AUTOMATED COUNT: 43.8 FL (ref 35.9–50)
FASTING STATUS PATIENT QL REPORTED: NORMAL
GLOBULIN SER CALC-MCNC: 2.5 G/DL (ref 1.9–3.5)
GLUCOSE SERPL-MCNC: 174 MG/DL (ref 65–99)
HBA1C MFR BLD: 6.2 % (ref 0–5.6)
HCT VFR BLD AUTO: 40.1 % (ref 42–52)
HCV AB SER QL: NEGATIVE
HDLC SERPL-MCNC: 33 MG/DL
HGB BLD-MCNC: 13.5 G/DL (ref 14–18)
IMM GRANULOCYTES # BLD AUTO: 0.01 K/UL (ref 0–0.11)
IMM GRANULOCYTES NFR BLD AUTO: 0.2 % (ref 0–0.9)
INT CON NEG: ABNORMAL
INT CON POS: ABNORMAL
LDLC SERPL CALC-MCNC: 110 MG/DL
LYMPHOCYTES # BLD AUTO: 1.3 K/UL (ref 1–4.8)
LYMPHOCYTES NFR BLD: 26.7 % (ref 22–41)
MCH RBC QN AUTO: 33.3 PG (ref 27–33)
MCHC RBC AUTO-ENTMCNC: 33.7 G/DL (ref 33.7–35.3)
MCV RBC AUTO: 98.8 FL (ref 81.4–97.8)
MICROALBUMIN UR-MCNC: 0.7 MG/DL
MICROALBUMIN/CREAT UR: 10 MG/G (ref 0–30)
MONOCYTES # BLD AUTO: 0.46 K/UL (ref 0–0.85)
MONOCYTES NFR BLD AUTO: 9.5 % (ref 0–13.4)
NEUTROPHILS # BLD AUTO: 2.66 K/UL (ref 1.82–7.42)
NEUTROPHILS NFR BLD: 54.8 % (ref 44–72)
NRBC # BLD AUTO: 0 K/UL
NRBC BLD-RTO: 0 /100 WBC
PLATELET # BLD AUTO: 209 K/UL (ref 164–446)
PMV BLD AUTO: 10.8 FL (ref 9–12.9)
POTASSIUM SERPL-SCNC: 4.2 MMOL/L (ref 3.6–5.5)
PROT SERPL-MCNC: 6.7 G/DL (ref 6–8.2)
PSA SERPL-MCNC: 0.42 NG/ML (ref 0–4)
RBC # BLD AUTO: 4.06 M/UL (ref 4.7–6.1)
SODIUM SERPL-SCNC: 140 MMOL/L (ref 135–145)
T4 FREE SERPL-MCNC: 0.76 NG/DL (ref 0.53–1.43)
TRIGL SERPL-MCNC: 253 MG/DL (ref 0–149)
TSH SERPL DL<=0.005 MIU/L-ACNC: 3.98 UIU/ML (ref 0.38–5.33)
WBC # BLD AUTO: 4.9 K/UL (ref 4.8–10.8)

## 2019-06-19 PROCEDURE — 36415 COLL VENOUS BLD VENIPUNCTURE: CPT

## 2019-06-19 PROCEDURE — 86803 HEPATITIS C AB TEST: CPT

## 2019-06-19 PROCEDURE — 84443 ASSAY THYROID STIM HORMONE: CPT

## 2019-06-19 PROCEDURE — 8041 PR SCP AHA: Performed by: FAMILY MEDICINE

## 2019-06-19 PROCEDURE — 80053 COMPREHEN METABOLIC PANEL: CPT

## 2019-06-19 PROCEDURE — 84153 ASSAY OF PSA TOTAL: CPT

## 2019-06-19 PROCEDURE — 85025 COMPLETE CBC W/AUTO DIFF WBC: CPT

## 2019-06-19 PROCEDURE — 80061 LIPID PANEL: CPT

## 2019-06-19 PROCEDURE — 82570 ASSAY OF URINE CREATININE: CPT

## 2019-06-19 PROCEDURE — 99215 OFFICE O/P EST HI 40 MIN: CPT | Performed by: FAMILY MEDICINE

## 2019-06-19 PROCEDURE — 84439 ASSAY OF FREE THYROXINE: CPT

## 2019-06-19 PROCEDURE — 82043 UR ALBUMIN QUANTITATIVE: CPT

## 2019-06-19 PROCEDURE — 83036 HEMOGLOBIN GLYCOSYLATED A1C: CPT | Performed by: FAMILY MEDICINE

## 2019-06-19 RX ORDER — ESCITALOPRAM OXALATE 20 MG/1
10 TABLET ORAL
Qty: 90 TAB | Refills: 3 | Status: SHIPPED | OUTPATIENT
Start: 2019-06-19 | End: 2019-06-19 | Stop reason: SDUPTHER

## 2019-06-19 RX ORDER — OXYCODONE AND ACETAMINOPHEN 10; 325 MG/1; MG/1
1-2 TABLET ORAL EVERY 4 HOURS PRN
COMMUNITY
End: 2020-02-11

## 2019-06-19 RX ORDER — GABAPENTIN 300 MG/1
300 CAPSULE ORAL 3 TIMES DAILY
COMMUNITY
End: 2023-02-13 | Stop reason: SDUPTHER

## 2019-06-19 RX ORDER — ESCITALOPRAM OXALATE 20 MG/1
20 TABLET ORAL
Qty: 90 TAB | Refills: 3 | Status: SHIPPED | OUTPATIENT
Start: 2019-06-19 | End: 2020-08-17

## 2019-06-19 RX ORDER — METFORMIN HYDROCHLORIDE 500 MG/1
1000 TABLET, EXTENDED RELEASE ORAL 2 TIMES DAILY
Qty: 360 TAB | Refills: 3 | Status: SHIPPED | OUTPATIENT
Start: 2019-06-19 | End: 2020-08-10

## 2019-06-19 ASSESSMENT — PATIENT HEALTH QUESTIONNAIRE - PHQ9
SUM OF ALL RESPONSES TO PHQ QUESTIONS 1-9: 22
CLINICAL INTERPRETATION OF PHQ2 SCORE: 4
5. POOR APPETITE OR OVEREATING: 3 - NEARLY EVERY DAY

## 2019-06-19 NOTE — PATIENT INSTRUCTIONS
Depression, Adult  Depression refers to feeling sad, low, down in the dumps, blue, gloomy, or empty. In general, there are two kinds of depression:  1. Normal sadness or normal grief. This kind of depression is one that we all feel from time to time after upsetting life experiences, such as the loss of a job or the ending of a relationship. This kind of depression is considered normal, is short lived, and resolves within a few days to 2 weeks. Depression experienced after the loss of a loved one (bereavement) often lasts longer than 2 weeks but normally gets better with time.  2. Clinical depression. This kind of depression lasts longer than normal sadness or normal grief or interferes with your ability to function at home, at work, and in school. It also interferes with your personal relationships. It affects almost every aspect of your life. Clinical depression is an illness.  Symptoms of depression can also be caused by conditions other than those mentioned above, such as:  · Physical illness. Some physical illnesses, including underactive thyroid gland (hypothyroidism), severe anemia, specific types of cancer, diabetes, uncontrolled seizures, heart and lung problems, strokes, and chronic pain are commonly associated with symptoms of depression.  · Side effects of some prescription medicine. In some people, certain types of medicine can cause symptoms of depression.  · Substance abuse. Abuse of alcohol and illicit drugs can cause symptoms of depression.  SYMPTOMS  Symptoms of normal sadness and normal grief include the following:  · Feeling sad or crying for short periods of time.  · Not caring about anything (apathy).  · Difficulty sleeping or sleeping too much.  · No longer able to enjoy the things you used to enjoy.  · Desire to be by oneself all the time (social isolation).  · Lack of energy or motivation.  · Difficulty concentrating or remembering.  · Change in appetite or weight.  · Restlessness or  agitation.  Symptoms of clinical depression include the same symptoms of normal sadness or normal grief and also the following symptoms:  · Feeling sad or crying all the time.  · Feelings of guilt or worthlessness.  · Feelings of hopelessness or helplessness.  · Thoughts of suicide or the desire to harm yourself (suicidal ideation).  · Loss of touch with reality (psychotic symptoms). Seeing or hearing things that are not real (hallucinations) or having false beliefs about your life or the people around you (delusions and paranoia).  DIAGNOSIS   The diagnosis of clinical depression is usually based on how bad the symptoms are and how long they have lasted. Your health care provider will also ask you questions about your medical history and substance use to find out if physical illness, use of prescription medicine, or substance abuse is causing your depression. Your health care provider may also order blood tests.  TREATMENT   Often, normal sadness and normal grief do not require treatment. However, sometimes antidepressant medicine is given for bereavement to ease the depressive symptoms until they resolve.  The treatment for clinical depression depends on how bad the symptoms are but often includes antidepressant medicine, counseling with a mental health professional, or both. Your health care provider will help to determine what treatment is best for you.  Depression caused by physical illness usually goes away with appropriate medical treatment of the illness. If prescription medicine is causing depression, talk with your health care provider about stopping the medicine, decreasing the dose, or changing to another medicine.  Depression caused by the abuse of alcohol or illicit drugs goes away when you stop using these substances. Some adults need professional help in order to stop drinking or using drugs.  SEEK IMMEDIATE MEDICAL CARE IF:  · You have thoughts about hurting yourself or others.  · You lose touch  with reality (have psychotic symptoms).  · You are taking medicine for depression and have a serious side effect.  FOR MORE INFORMATION  · National [place] on Mental Illness: www.[place].org   · National [place] of Mental Health: www.nimh.nih.gov      This information is not intended to replace advice given to you by your health care provider. Make sure you discuss any questions you have with your health care provider.     Document Released: 12/15/2001 Document Revised: 01/08/2016 Document Reviewed: 03/18/2013  Elsevier Interactive Patient Education ©2016 [employer]

## 2019-06-19 NOTE — ASSESSMENT & PLAN NOTE
Stable. Currently taking Metformin ER 1000 mg BID as directed.  Denies side effects or hypoglycemic events.  He is not monitoring blood sugar regularly at home.  Reports diet is not great - he has been focusing on his wife's health and not his  He is not exercising regularly.  Last eye exam: one year ago  Denies polyuria, polydipsia, blurred vision, or neuropathies.

## 2019-06-19 NOTE — ASSESSMENT & PLAN NOTE
Patient is complaining of increased depression and caregiver fatigue.  His wife was diagnosed with breast cancer in December 2018.  She had a mastectomy and breast reduction.  She was sent home the same day and then started having increasing shortness of breath.  He took her back to the emergency room where she ended up coding and CPR was initiated.  They were able to revive her.  She has had multiple complications since then as she had underlying poor health already.  He feels overwhelmed daily with loss of enjoyment of regular activities.    Currently taking Lexapro 10 mg as prescribed.   Denies side effects and is tolerating well.  Patient denies SI/HI.  Depression Screen (PHQ-2/PHQ-9) 4/13/2017 7/16/2018 6/19/2019   PHQ-2 Total Score 3 0 4   PHQ-9 Total Score 5 - 22

## 2019-06-19 NOTE — ASSESSMENT & PLAN NOTE
Patient had a CT scan 5/10/2018 that showed a 4.1 cm a sending aortic aneurysm.  He did see vascular surgery and was supposed to be on a 6-month follow-up but then his wife got quite sick.  He has not had a repeat study since then.
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
## 2019-06-19 NOTE — ASSESSMENT & PLAN NOTE
Stable. Currently taking triamterene-hydrochlorothiazide 75-50 mg, losartan 50 mg and nifedipine 90 mg CR daily as directed.   He is taking baby aspirin daily.   He is not monitoring BP at home.   Denies symptoms low BP: light-headed, tunnel-vision, unusual fatigue.   Denies symptoms high BP:pounding headache, visual changes, palpitations, flushed face.   Denies medicine side effects: unusual fatigue, slow heartbeat, foot/leg swelling, cough.  He does get occasional dizziness but feels this is related to his Ménière's disease.  He is scheduled to see ENT next month.

## 2019-06-19 NOTE — ASSESSMENT & PLAN NOTE
Patient has been unable to tolerate 2 different statins.  They make him very nauseated and he has general malaise when he takes them.

## 2019-06-20 DIAGNOSIS — E03.9 ACQUIRED HYPOTHYROIDISM: ICD-10-CM

## 2019-06-20 RX ORDER — LEVOTHYROXINE SODIUM 0.07 MG/1
75 TABLET ORAL
Qty: 90 TAB | Refills: 1 | Status: SHIPPED | OUTPATIENT
Start: 2019-06-20 | End: 2020-01-05

## 2019-07-04 ENCOUNTER — HOSPITAL ENCOUNTER (OUTPATIENT)
Dept: CARDIOLOGY | Facility: MEDICAL CENTER | Age: 74
End: 2019-07-04
Attending: FAMILY MEDICINE
Payer: MEDICARE

## 2019-07-04 DIAGNOSIS — I71.21 ANEURYSM OF ASCENDING AORTA (HCC): ICD-10-CM

## 2019-07-04 LAB
LV EJECT FRACT MOD 2C 99903: 59.26
LV EJECT FRACT MOD 4C 99902: 70.82
LV EJECT FRACT MOD BP 99901: 64.49

## 2019-07-04 PROCEDURE — 93306 TTE W/DOPPLER COMPLETE: CPT | Mod: 26 | Performed by: INTERNAL MEDICINE

## 2019-07-04 PROCEDURE — 93306 TTE W/DOPPLER COMPLETE: CPT

## 2019-07-10 ENCOUNTER — TELEPHONE (OUTPATIENT)
Dept: MEDICAL GROUP | Facility: LAB | Age: 74
End: 2019-07-10

## 2019-07-10 NOTE — TELEPHONE ENCOUNTER
ESTABLISHED PATIENT PRE-VISIT PLANNING     Patient was NOT contacted to complete PVP.     Note: Patient will not be contacted if there is no indication to call.     1.  Reviewed notes from the last few office visits within the medical group: Yes    2.  If any orders were placed at last visit or intended to be done for this visit (i.e. 6 mos follow-up), do we have Results/Consult Notes?        •  Labs - Labs ordered, completed on 6/19/19 and results are in chart.   Note: If patient appointment is for lab review and patient did not complete labs, check with provider if OK to reschedule patient until labs completed.       •  Imaging - Imaging ordered, completed and results are in chart.       •  Referrals - No referrals were ordered at last office visit.    3. Is this appointment scheduled as a Hospital Follow-Up? No    4.  Immunizations were updated in [employer] using WebIZ?: Yes       •  Web Iz Recommendations: TDAP and SHINGRIX (Shingles)    5.  Patient is due for the following Health Maintenance Topics:   Health Maintenance Due   Topic Date Due   • IMM ZOSTER VACCINES (1 of 2) 03/16/1997   • IMM DTaP/Tdap/Td Vaccine (1 - Tdap) 10/02/2009   • COLONOSCOPY  06/02/2018       - Patient has completed PNEUMOVAX (PPSV23) and PREVNAR (PCV13)  Immunization(s) per WebIZ. Chart has been updated.    6. Orders for overdue Health Maintenance topics pended in Pre-Charting? N\A    7.  AHA (MDX) form printed for Provider? No, already completed    8.  Patient was NOT informed to arrive 15 min prior to their scheduled appointment and bring in their medication bottles.

## 2019-07-17 ENCOUNTER — OFFICE VISIT (OUTPATIENT)
Dept: MEDICAL GROUP | Facility: LAB | Age: 74
End: 2019-07-17
Payer: MEDICARE

## 2019-07-17 VITALS
BODY MASS INDEX: 42.3 KG/M2 | HEIGHT: 69 IN | WEIGHT: 285.6 LBS | TEMPERATURE: 97.5 F | SYSTOLIC BLOOD PRESSURE: 132 MMHG | HEART RATE: 75 BPM | DIASTOLIC BLOOD PRESSURE: 82 MMHG | OXYGEN SATURATION: 97 %

## 2019-07-17 DIAGNOSIS — R19.7 DIARRHEA, UNSPECIFIED TYPE: ICD-10-CM

## 2019-07-17 DIAGNOSIS — F32.1 CURRENT MODERATE EPISODE OF MAJOR DEPRESSIVE DISORDER WITHOUT PRIOR EPISODE (HCC): ICD-10-CM

## 2019-07-17 DIAGNOSIS — E11.65 TYPE 2 DIABETES MELLITUS WITH HYPERGLYCEMIA, WITHOUT LONG-TERM CURRENT USE OF INSULIN (HCC): ICD-10-CM

## 2019-07-17 PROCEDURE — 99214 OFFICE O/P EST MOD 30 MIN: CPT | Performed by: FAMILY MEDICINE

## 2019-07-17 ASSESSMENT — PATIENT HEALTH QUESTIONNAIRE - PHQ9: CLINICAL INTERPRETATION OF PHQ2 SCORE: 0

## 2019-07-17 NOTE — PATIENT INSTRUCTIONS
Food Choices to Help Relieve Diarrhea, Adult  When you have diarrhea, the foods you eat and your eating habits are very important. Choosing the right foods and drinks can help relieve diarrhea. Also, because diarrhea can last up to 7 days, you need to replace lost fluids and electrolytes (such as sodium, potassium, and chloride) in order to help prevent dehydration.   WHAT GENERAL GUIDELINES DO I NEED TO FOLLOW?  · Slowly drink 1 cup (8 oz) of fluid for each episode of diarrhea. If you are getting enough fluid, your urine will be clear or pale yellow.  · Eat starchy foods. Some good choices include white rice, white toast, pasta, low-fiber cereal, baked potatoes (without the skin), saltine crackers, and bagels.  · Avoid large servings of any cooked vegetables.  · Limit fruit to two servings per day. A serving is ½ cup or 1 small piece.  · Choose foods with less than 2 g of fiber per serving.  · Limit fats to less than 8 tsp (38 g) per day.  · Avoid fried foods.  · Eat foods that have probiotics in them. Probiotics can be found in certain dairy products.  · Avoid foods and beverages that may increase the speed at which food moves through the stomach and intestines (gastrointestinal tract). Things to avoid include:  ¨ High-fiber foods, such as dried fruit, raw fruits and vegetables, nuts, seeds, and whole grain foods.  ¨ Spicy foods and high-fat foods.  ¨ Foods and beverages sweetened with high-fructose corn syrup, honey, or sugar alcohols such as xylitol, sorbitol, and mannitol.  WHAT FOODS ARE RECOMMENDED?  Grains  White rice. White, [place], or naye breads (fresh or toasted), including plain rolls, buns, or bagels. White pasta. Saltine, soda, or marisol crackers. Pretzels. Low-fiber cereal. Cooked cereals made with water (such as cornmeal, farina, or cream cereals). Plain muffins. Matzo. Jazmin toast. Zwieback.   Vegetables  Potatoes (without the skin). Strained tomato and vegetable juices. Most well-cooked and canned  vegetables without seeds. Tender lettuce.  Fruits  Cooked or canned applesauce, apricots, cherries, fruit cocktail, grapefruit, peaches, pears, or plums. Fresh bananas, apples without skin, cherries, grapes, cantaloupe, grapefruit, peaches, oranges, or plums.   Meat and Other Protein Products  Baked or boiled chicken. Eggs. Tofu. Fish. Seafood. Smooth peanut butter. Ground or well-cooked tender beef, ham, veal, lamb, pork, or poultry.   Dairy  Plain yogurt, kefir, and unsweetened liquid yogurt. Lactose-free milk, buttermilk, or soy milk. Plain hard cheese.  Beverages  Sport drinks. Clear broths. Diluted fruit juices (except prune). Regular, caffeine-free sodas such as ginger ale. Water. Decaffeinated teas. Oral rehydration solutions. Sugar-free beverages not sweetened with sugar alcohols.  Other  Bouillon, broth, or soups made from recommended foods.   The items listed above may not be a complete list of recommended foods or beverages. Contact your dietitian for more options.  WHAT FOODS ARE NOT RECOMMENDED?  Grains  Whole grain, whole wheat, bran, or rye breads, rolls, pastas, crackers, and cereals. Wild or brown rice. Cereals that contain more than 2 g of fiber per serving. Corn tortillas or taco shells. Cooked or dry oatmeal. Granola. Popcorn.  Vegetables  Raw vegetables. Cabbage, broccoli, Seth sprouts, artichokes, baked beans, beet greens, corn, kale, legumes, peas, sweet potatoes, and yams. Potato skins. Cooked spinach and cabbage.  Fruits  Dried fruit, including raisins and dates. Raw fruits. Stewed or dried prunes. Fresh apples with skin, apricots, mangoes, pears, raspberries, and strawberries.   Meat and Other Protein Products  Welch peanut butter. Nuts and seeds. Beans and lentils. Bartholomew.   Dairy  High-fat cheeses. Milk, chocolate milk, and beverages made with milk, such as milk shakes. Cream. Ice cream.  Sweets and Desserts  Sweet rolls, doughnuts, and sweet breads. Pancakes and waffles.  Fats and  Oils  Butter. Cream sauces. Margarine. Salad oils. Plain salad dressings. Olives. Avocados.   Beverages  Caffeinated beverages (such as coffee, tea, soda, or energy drinks). Alcoholic beverages. Fruit juices with pulp. Prune juice. Soft drinks sweetened with high-fructose corn syrup or sugar alcohols.  Other  Coconut. Hot sauce. Chili powder. Mayonnaise. Gravy. Cream-based or milk-based soups.   The items listed above may not be a complete list of foods and beverages to avoid. Contact your dietitian for more information.  WHAT SHOULD I DO IF I BECOME DEHYDRATED?  Diarrhea can sometimes lead to dehydration. Signs of dehydration include dark urine and dry mouth and skin. If you think you are dehydrated, you should rehydrate with an oral rehydration solution. These solutions can be purchased at pharmacies, retail stores, or online.   Drink ½-1 cup (120-240 mL) of oral rehydration solution each time you have an episode of diarrhea. If drinking this amount makes your diarrhea worse, try drinking smaller amounts more often. For example, drink 1-3 tsp (5-15 mL) every 5-10 minutes.   A general rule for staying hydrated is to drink 1½-2 L of fluid per day. Talk to your health care provider about the specific amount you should be drinking each day. Drink enough fluids to keep your urine clear or pale yellow.     This information is not intended to replace advice given to you by your health care provider. Make sure you discuss any questions you have with your health care provider.     Document Released: 03/09/2005 Document Revised: 01/08/2016 Document Reviewed: 11/10/2014  [email or web address] Interactive Patient Education ©2016 [email or web address]

## 2019-07-17 NOTE — ASSESSMENT & PLAN NOTE
Stable. Currently taking escitalopram 20 mg daily as prescribed.   Denies side effects and is tolerating well.  Mood is improved with current medication and therapy.    Patient denies SI/HI.  Depression Screen (PHQ-2/PHQ-9) 7/16/2018 6/19/2019 7/17/2019   PHQ-2 Total Score 0 4 0   PHQ-9 Total Score - 22 -     He feels like he is stressing more,

## 2019-07-17 NOTE — ASSESSMENT & PLAN NOTE
He is having some diarrhea with the Metformin but it has worsened in the last 7 - 10 days.  He ate some macaroni salad from [employer] and he didn't feel well after eating it.  He is having loose stools every other day - 1-2 episodes daily.  Doubled gabapentin dose 6 weeks ago.
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
## 2019-08-19 ENCOUNTER — HOSPITAL ENCOUNTER (OUTPATIENT)
Dept: LAB | Facility: MEDICAL CENTER | Age: 74
End: 2019-08-19
Attending: UROLOGY
Payer: MEDICARE

## 2019-08-19 PROCEDURE — 36415 COLL VENOUS BLD VENIPUNCTURE: CPT

## 2019-08-19 PROCEDURE — 84153 ASSAY OF PSA TOTAL: CPT

## 2019-08-20 LAB — PSA SERPL-MCNC: 0.42 NG/ML (ref 0–4)

## 2019-09-01 DIAGNOSIS — I10 ESSENTIAL HYPERTENSION: ICD-10-CM

## 2019-09-01 DIAGNOSIS — E11.65 TYPE 2 DIABETES MELLITUS WITH HYPERGLYCEMIA, WITHOUT LONG-TERM CURRENT USE OF INSULIN (HCC): ICD-10-CM

## 2019-09-02 NOTE — TELEPHONE ENCOUNTER
Was the patient seen in the last year in this department? No lov 7/17/19    Does patient have an active prescription for medications requested? Yes    Received Request Via: Pharmacy     100 day supply

## 2019-09-03 RX ORDER — NIFEDIPINE 90 MG/1
TABLET, FILM COATED, EXTENDED RELEASE ORAL
Qty: 100 TAB | Refills: 3 | Status: SHIPPED | OUTPATIENT
Start: 2019-09-03 | End: 2020-10-23 | Stop reason: SDUPTHER

## 2019-09-03 RX ORDER — LOSARTAN POTASSIUM 50 MG/1
TABLET ORAL
Qty: 100 TAB | Refills: 3 | Status: SHIPPED | OUTPATIENT
Start: 2019-09-03 | End: 2020-12-22 | Stop reason: SDUPTHER

## 2019-11-04 RX ORDER — TRIAMTERENE AND HYDROCHLOROTHIAZIDE 75; 50 MG/1; MG/1
TABLET ORAL
Qty: 90 TAB | Refills: 3 | Status: SHIPPED | OUTPATIENT
Start: 2019-11-04 | End: 2021-01-05 | Stop reason: SDUPTHER

## 2019-11-04 NOTE — TELEPHONE ENCOUNTER
Was the patient seen in the last year in this department? Yes  7/17/19  Does patient have an active prescription for medications requested? No     Received Request Via: Pharmacy

## 2020-01-04 DIAGNOSIS — E03.9 ACQUIRED HYPOTHYROIDISM: ICD-10-CM

## 2020-01-06 RX ORDER — LEVOTHYROXINE SODIUM 0.07 MG/1
TABLET ORAL
Qty: 90 TAB | Refills: 1 | Status: SHIPPED | OUTPATIENT
Start: 2020-01-06 | End: 2020-07-07

## 2020-01-23 ENCOUNTER — PATIENT OUTREACH (OUTPATIENT)
Dept: HEALTH INFORMATION MANAGEMENT | Facility: OTHER | Age: 75
End: 2020-01-23

## 2020-01-23 NOTE — PROGRESS NOTES
1. HealthConnect Verified: yes    2. Verify PCP: yes    3. Review and add  to Care Team: yes    4. WebIZ Checked & Epic Updated: Yes  WebIZ Recommendations: Patient is up to date on all vaccines  Is patient due for Tdap? NO  Is patient due for Shingles? NO    5. Reviewed/Updated the following with patient:       •   Communication Preference Obtained? YES  • MyChart Activation: already active       •   E-Mail Address Obtained? YES       •   Appointment Day and Time Preferences? YES       •   Preferred Pharmacy? YES       •   Preferred Lab? YES    6. Care Gap Scheduling (Attempt to Schedule EACH Overdue Care Gap!)    Scheduled patient for Annual Wellness Visit          Called member and introduced myself to them as their SCP . I was able to verify all demographics as well as their PCP. I went through the chart in Epic to make sure everything was up to date. The member has no other questions at this time. If they have further questions, comments or concerns please transfer to [phone]

## 2020-02-06 ENCOUNTER — TELEPHONE (OUTPATIENT)
Dept: MEDICAL GROUP | Facility: LAB | Age: 75
End: 2020-02-06

## 2020-02-06 NOTE — TELEPHONE ENCOUNTER
ANNUAL WELLNESS VISIT PRE-VISIT PLANNING WITH OUTREACH    1.  If any orders were placed at last visit, do we have Results/Consult Notes?        •  Labs - Labs were not ordered at last office visit.  Note: If patient appointment is for lab review and patient did not complete labs, check with provider if OK to reschedule patient until labs completed.       •  Imaging - Imaging was not ordered at last office visit.       •  Referrals - No referrals were ordered at last office visit.    2.  Immunizations were updated in Epic using WebIZ?:Yes       •  WebIZ Recommendations: TDAP and SHINGRIX (Shingles)       •  Is patient due for Tdap? YES. Patient was not notified of copay/out of pocket cost.       •  Is patient due for Shingrx? YES. Patient was not notified of copay/out of pocket cost.    3.  Patient has the following Care Path diagnoses on Problem List:  DIABETES      DEPRESSION       Depression Screen (PHQ-2/PHQ-9) 7/16/2018 6/19/2019 7/17/2019   PHQ-2 Total Score 0 4 0   PHQ-9 Total Score - 22 -       Interpretation of PHQ-9 Total Score   Score Severity   1-4 No Depression   5-9 Mild Depression   10-14 Moderate Depression   15-19 Moderately Severe Depression   20-27 Severe Depression      4.  Orders for overdue Health Maintenance topics pended in Pre-Charting? NO

## 2020-02-11 ENCOUNTER — OFFICE VISIT (OUTPATIENT)
Dept: MEDICAL GROUP | Facility: LAB | Age: 75
End: 2020-02-11
Payer: MEDICARE

## 2020-02-11 VITALS
RESPIRATION RATE: 14 BRPM | DIASTOLIC BLOOD PRESSURE: 68 MMHG | SYSTOLIC BLOOD PRESSURE: 138 MMHG | WEIGHT: 286 LBS | BODY MASS INDEX: 43.35 KG/M2 | TEMPERATURE: 97.3 F | HEIGHT: 68 IN | HEART RATE: 77 BPM | OXYGEN SATURATION: 96 %

## 2020-02-11 DIAGNOSIS — R53.83 CAREGIVER WITH FATIGUE: ICD-10-CM

## 2020-02-11 DIAGNOSIS — E66.01 CLASS 3 SEVERE OBESITY WITHOUT SERIOUS COMORBIDITY WITH BODY MASS INDEX (BMI) OF 40.0 TO 44.9 IN ADULT, UNSPECIFIED OBESITY TYPE (HCC): ICD-10-CM

## 2020-02-11 DIAGNOSIS — Z00.00 MEDICARE ANNUAL WELLNESS VISIT, SUBSEQUENT: ICD-10-CM

## 2020-02-11 DIAGNOSIS — Z85.46 PERSONAL HISTORY OF PROSTATE CANCER: ICD-10-CM

## 2020-02-11 DIAGNOSIS — J45.20 MILD INTERMITTENT ASTHMA WITHOUT COMPLICATION: ICD-10-CM

## 2020-02-11 DIAGNOSIS — E78.2 MIXED HYPERLIPIDEMIA: ICD-10-CM

## 2020-02-11 DIAGNOSIS — I77.810 DILATED AORTIC ROOT (HCC): ICD-10-CM

## 2020-02-11 DIAGNOSIS — M79.645 FINGER PAIN, LEFT: ICD-10-CM

## 2020-02-11 DIAGNOSIS — E11.65 TYPE 2 DIABETES MELLITUS WITH HYPERGLYCEMIA, WITHOUT LONG-TERM CURRENT USE OF INSULIN (HCC): ICD-10-CM

## 2020-02-11 DIAGNOSIS — F32.1 CURRENT MODERATE EPISODE OF MAJOR DEPRESSIVE DISORDER WITHOUT PRIOR EPISODE (HCC): ICD-10-CM

## 2020-02-11 DIAGNOSIS — E03.9 ACQUIRED HYPOTHYROIDISM: ICD-10-CM

## 2020-02-11 DIAGNOSIS — Z23 NEED FOR VACCINATION: ICD-10-CM

## 2020-02-11 DIAGNOSIS — I10 ESSENTIAL HYPERTENSION: ICD-10-CM

## 2020-02-11 DIAGNOSIS — H81.03 MENIERE'S DISEASE OF BOTH EARS: ICD-10-CM

## 2020-02-11 PROBLEM — H91.90 HEARING LOSS: Status: ACTIVE | Noted: 2020-02-11

## 2020-02-11 PROBLEM — J45.909 ASTHMA: Status: ACTIVE | Noted: 2020-02-11

## 2020-02-11 PROBLEM — E29.1 TESTICULAR HYPOFUNCTION: Status: ACTIVE | Noted: 2020-02-11

## 2020-02-11 LAB
HBA1C MFR BLD: 6.4 % (ref 0–5.6)
INT CON NEG: NEGATIVE
INT CON POS: POSITIVE

## 2020-02-11 PROCEDURE — 83036 HEMOGLOBIN GLYCOSYLATED A1C: CPT | Performed by: FAMILY MEDICINE

## 2020-02-11 PROCEDURE — 90471 IMMUNIZATION ADMIN: CPT | Performed by: FAMILY MEDICINE

## 2020-02-11 PROCEDURE — G0439 PPPS, SUBSEQ VISIT: HCPCS | Performed by: FAMILY MEDICINE

## 2020-02-11 PROCEDURE — 90715 TDAP VACCINE 7 YRS/> IM: CPT | Performed by: FAMILY MEDICINE

## 2020-02-11 PROCEDURE — 8041 PR SCP AHA: Performed by: FAMILY MEDICINE

## 2020-02-11 ASSESSMENT — ACTIVITIES OF DAILY LIVING (ADL): BATHING_REQUIRES_ASSISTANCE: 0

## 2020-02-11 ASSESSMENT — ENCOUNTER SYMPTOMS: GENERAL WELL-BEING: POOR

## 2020-02-11 ASSESSMENT — PATIENT HEALTH QUESTIONNAIRE - PHQ9: CLINICAL INTERPRETATION OF PHQ2 SCORE: 0

## 2020-02-11 NOTE — ASSESSMENT & PLAN NOTE
Stable. Currently taking escitalopram 20 mg as prescribed.   Denies side effects and is tolerating well.  Mood is improved with current medication and therapy.    Patient denies SI/HI.  Depression Screen (PHQ-2/PHQ-9) 6/19/2019 7/17/2019 2/11/2020   PHQ-2 Total Score 4 0 0   PHQ-9 Total Score 22 - -
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
## 2020-02-18 ENCOUNTER — HOSPITAL ENCOUNTER (OUTPATIENT)
Dept: LAB | Facility: MEDICAL CENTER | Age: 75
End: 2020-02-18
Attending: FAMILY MEDICINE
Payer: MEDICARE

## 2020-02-18 DIAGNOSIS — E11.65 TYPE 2 DIABETES MELLITUS WITH HYPERGLYCEMIA, WITHOUT LONG-TERM CURRENT USE OF INSULIN (HCC): ICD-10-CM

## 2020-02-18 LAB
ANION GAP SERPL CALC-SCNC: 15 MMOL/L (ref 7–16)
BUN SERPL-MCNC: 26 MG/DL (ref 8–22)
CALCIUM SERPL-MCNC: 9.6 MG/DL (ref 8.4–10.2)
CHLORIDE SERPL-SCNC: 100 MMOL/L (ref 96–112)
CO2 SERPL-SCNC: 22 MMOL/L (ref 20–33)
CREAT SERPL-MCNC: 1.08 MG/DL (ref 0.5–1.4)
GLUCOSE SERPL-MCNC: 156 MG/DL (ref 65–99)
POTASSIUM SERPL-SCNC: 4.3 MMOL/L (ref 3.6–5.5)
SODIUM SERPL-SCNC: 137 MMOL/L (ref 135–145)

## 2020-02-18 PROCEDURE — 80048 BASIC METABOLIC PNL TOTAL CA: CPT

## 2020-02-18 PROCEDURE — 36415 COLL VENOUS BLD VENIPUNCTURE: CPT

## 2020-02-19 ENCOUNTER — HOSPITAL ENCOUNTER (OUTPATIENT)
Dept: RADIOLOGY | Facility: MEDICAL CENTER | Age: 75
End: 2020-02-19
Attending: FAMILY MEDICINE
Payer: MEDICARE

## 2020-02-19 DIAGNOSIS — I77.810 DILATED AORTIC ROOT (HCC): ICD-10-CM

## 2020-02-19 DIAGNOSIS — M79.645 FINGER PAIN, LEFT: ICD-10-CM

## 2020-02-19 PROCEDURE — 71275 CT ANGIOGRAPHY CHEST: CPT

## 2020-02-19 PROCEDURE — 73140 X-RAY EXAM OF FINGER(S): CPT | Mod: LT

## 2020-02-19 PROCEDURE — 700117 HCHG RX CONTRAST REV CODE 255: Performed by: FAMILY MEDICINE

## 2020-02-19 RX ADMIN — IOHEXOL 80 ML: 350 INJECTION, SOLUTION INTRAVENOUS at 07:56

## 2020-03-16 ENCOUNTER — PATIENT OUTREACH (OUTPATIENT)
Dept: HEALTH INFORMATION MANAGEMENT | Facility: OTHER | Age: 75
End: 2020-03-16

## 2020-07-05 DIAGNOSIS — E03.9 ACQUIRED HYPOTHYROIDISM: ICD-10-CM

## 2020-07-07 ENCOUNTER — APPOINTMENT (OUTPATIENT)
Dept: RADIOLOGY | Facility: MEDICAL CENTER | Age: 75
End: 2020-07-07
Attending: PHYSICIAN ASSISTANT
Payer: MEDICARE

## 2020-07-07 DIAGNOSIS — M54.2 CERVICALGIA: ICD-10-CM

## 2020-07-07 PROCEDURE — 72141 MRI NECK SPINE W/O DYE: CPT

## 2020-07-07 RX ORDER — LEVOTHYROXINE SODIUM 0.07 MG/1
TABLET ORAL
Qty: 90 TAB | Refills: 1 | Status: SHIPPED | OUTPATIENT
Start: 2020-07-07 | End: 2021-02-03

## 2020-07-23 ENCOUNTER — HOSPITAL ENCOUNTER (OUTPATIENT)
Facility: MEDICAL CENTER | Age: 75
End: 2020-07-23
Attending: NEUROLOGICAL SURGERY | Admitting: NEUROLOGICAL SURGERY
Payer: MEDICARE

## 2020-07-30 ENCOUNTER — OFFICE VISIT (OUTPATIENT)
Dept: MEDICAL GROUP | Facility: LAB | Age: 75
End: 2020-07-30
Payer: MEDICARE

## 2020-07-30 ENCOUNTER — HOSPITAL ENCOUNTER (OUTPATIENT)
Dept: LAB | Facility: MEDICAL CENTER | Age: 75
End: 2020-07-30
Attending: FAMILY MEDICINE
Payer: MEDICARE

## 2020-07-30 ENCOUNTER — APPOINTMENT (OUTPATIENT)
Dept: MEDICAL GROUP | Facility: LAB | Age: 75
End: 2020-07-30
Payer: MEDICARE

## 2020-07-30 VITALS
WEIGHT: 281 LBS | HEART RATE: 82 BPM | SYSTOLIC BLOOD PRESSURE: 122 MMHG | TEMPERATURE: 97.7 F | DIASTOLIC BLOOD PRESSURE: 60 MMHG | OXYGEN SATURATION: 95 % | BODY MASS INDEX: 42.59 KG/M2 | HEIGHT: 68 IN | RESPIRATION RATE: 12 BRPM

## 2020-07-30 DIAGNOSIS — Z01.810 ENCOUNTER FOR PRE-OPERATIVE CARDIOVASCULAR CLEARANCE: ICD-10-CM

## 2020-07-30 LAB
ALBUMIN SERPL BCP-MCNC: 4.4 G/DL (ref 3.2–4.9)
ALBUMIN/GLOB SERPL: 1.9 G/DL
ALP SERPL-CCNC: 55 U/L (ref 30–99)
ALT SERPL-CCNC: 23 U/L (ref 2–50)
ANION GAP SERPL CALC-SCNC: 14 MMOL/L (ref 7–16)
AST SERPL-CCNC: 17 U/L (ref 12–45)
BILIRUB SERPL-MCNC: 0.4 MG/DL (ref 0.1–1.5)
BUN SERPL-MCNC: 22 MG/DL (ref 8–22)
CALCIUM SERPL-MCNC: 9.3 MG/DL (ref 8.5–10.5)
CHLORIDE SERPL-SCNC: 102 MMOL/L (ref 96–112)
CO2 SERPL-SCNC: 22 MMOL/L (ref 20–33)
CREAT SERPL-MCNC: 1.4 MG/DL (ref 0.5–1.4)
ERYTHROCYTE [DISTWIDTH] IN BLOOD BY AUTOMATED COUNT: 43.8 FL (ref 35.9–50)
GLOBULIN SER CALC-MCNC: 2.3 G/DL (ref 1.9–3.5)
GLUCOSE SERPL-MCNC: 183 MG/DL (ref 65–99)
HCT VFR BLD AUTO: 39.9 % (ref 42–52)
HGB BLD-MCNC: 14 G/DL (ref 14–18)
MCH RBC QN AUTO: 34.5 PG (ref 27–33)
MCHC RBC AUTO-ENTMCNC: 35.1 G/DL (ref 33.7–35.3)
MCV RBC AUTO: 98.3 FL (ref 81.4–97.8)
NT-PROBNP SERPL IA-MCNC: 81 PG/ML (ref 0–125)
PLATELET # BLD AUTO: 240 K/UL (ref 164–446)
PMV BLD AUTO: 9.5 FL (ref 9–12.9)
POTASSIUM SERPL-SCNC: 4 MMOL/L (ref 3.6–5.5)
PROT SERPL-MCNC: 6.7 G/DL (ref 6–8.2)
RBC # BLD AUTO: 4.06 M/UL (ref 4.7–6.1)
SODIUM SERPL-SCNC: 138 MMOL/L (ref 135–145)
WBC # BLD AUTO: 5.7 K/UL (ref 4.8–10.8)

## 2020-07-30 PROCEDURE — 85027 COMPLETE CBC AUTOMATED: CPT

## 2020-07-30 PROCEDURE — 80053 COMPREHEN METABOLIC PANEL: CPT

## 2020-07-30 PROCEDURE — 93000 ELECTROCARDIOGRAM COMPLETE: CPT | Performed by: FAMILY MEDICINE

## 2020-07-30 PROCEDURE — 99214 OFFICE O/P EST MOD 30 MIN: CPT | Performed by: FAMILY MEDICINE

## 2020-07-30 PROCEDURE — 83880 ASSAY OF NATRIURETIC PEPTIDE: CPT

## 2020-07-30 PROCEDURE — 36415 COLL VENOUS BLD VENIPUNCTURE: CPT

## 2020-07-30 ASSESSMENT — ENCOUNTER SYMPTOMS
COUGH: 0
CONSTIPATION: 0
VOMITING: 0
HEADACHES: 0
BLURRED VISION: 0
DIZZINESS: 0
FEVER: 0
SHORTNESS OF BREATH: 0
WHEEZING: 0
ABDOMINAL PAIN: 0
NAUSEA: 0
PALPITATIONS: 0
CHILLS: 0
DIARRHEA: 0

## 2020-07-30 ASSESSMENT — FIBROSIS 4 INDEX: FIB4 SCORE: 1.23
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
## 2020-08-03 ENCOUNTER — OFFICE VISIT (OUTPATIENT)
Dept: CARDIOLOGY | Facility: MEDICAL CENTER | Age: 75
End: 2020-08-03
Payer: MEDICARE

## 2020-08-03 VITALS
WEIGHT: 281.6 LBS | HEART RATE: 84 BPM | SYSTOLIC BLOOD PRESSURE: 138 MMHG | RESPIRATION RATE: 14 BRPM | BODY MASS INDEX: 41.71 KG/M2 | OXYGEN SATURATION: 95 % | HEIGHT: 69 IN | DIASTOLIC BLOOD PRESSURE: 72 MMHG

## 2020-08-03 DIAGNOSIS — E78.5 DYSLIPIDEMIA: ICD-10-CM

## 2020-08-03 DIAGNOSIS — E66.01 MORBID OBESITY (HCC): ICD-10-CM

## 2020-08-03 DIAGNOSIS — I25.10 CORONARY ARTERY CALCIFICATION SEEN ON CAT SCAN: ICD-10-CM

## 2020-08-03 DIAGNOSIS — R94.31 ABNORMAL EKG: ICD-10-CM

## 2020-08-03 DIAGNOSIS — T73.3XXA FATIGUE DUE TO EXCESSIVE EXERTION, INITIAL ENCOUNTER: ICD-10-CM

## 2020-08-03 DIAGNOSIS — I10 ESSENTIAL HYPERTENSION: ICD-10-CM

## 2020-08-03 PROCEDURE — 99204 OFFICE O/P NEW MOD 45 MIN: CPT | Performed by: INTERNAL MEDICINE

## 2020-08-03 ASSESSMENT — ENCOUNTER SYMPTOMS
LOSS OF CONSCIOUSNESS: 0
BRUISES/BLEEDS EASILY: 1
PALPITATIONS: 0
ABDOMINAL PAIN: 0
INSOMNIA: 0
BLURRED VISION: 0
DIZZINESS: 1
COUGH: 1
FEVER: 0
MYALGIAS: 0
CHILLS: 0
MEMORY LOSS: 1
SHORTNESS OF BREATH: 0
PND: 0
ORTHOPNEA: 0
NECK PAIN: 1

## 2020-08-03 ASSESSMENT — FIBROSIS 4 INDEX: FIB4 SCORE: 1.08
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
## 2020-08-07 ENCOUNTER — APPOINTMENT (OUTPATIENT)
Dept: ADMISSIONS | Facility: MEDICAL CENTER | Age: 75
End: 2020-08-07
Payer: MEDICARE

## 2020-08-11 ENCOUNTER — HOSPITAL ENCOUNTER (OUTPATIENT)
Dept: RADIOLOGY | Facility: MEDICAL CENTER | Age: 75
End: 2020-08-11
Attending: INTERNAL MEDICINE
Payer: MEDICARE

## 2020-08-11 DIAGNOSIS — R94.31 ABNORMAL EKG: ICD-10-CM

## 2020-08-11 DIAGNOSIS — T73.3XXA FATIGUE DUE TO EXCESSIVE EXERTION, INITIAL ENCOUNTER: ICD-10-CM

## 2020-08-11 DIAGNOSIS — I25.10 CORONARY ARTERY CALCIFICATION SEEN ON CAT SCAN: ICD-10-CM

## 2020-08-11 PROCEDURE — 700111 HCHG RX REV CODE 636 W/ 250 OVERRIDE (IP)

## 2020-08-11 PROCEDURE — 93018 CV STRESS TEST I&R ONLY: CPT | Performed by: INTERNAL MEDICINE

## 2020-08-11 PROCEDURE — A9502 TC99M TETROFOSMIN: HCPCS | Mod: ME

## 2020-08-11 PROCEDURE — 78452 HT MUSCLE IMAGE SPECT MULT: CPT | Mod: 26 | Performed by: INTERNAL MEDICINE

## 2020-08-11 RX ORDER — REGADENOSON 0.08 MG/ML
INJECTION, SOLUTION INTRAVENOUS
Status: COMPLETED
Start: 2020-08-11 | End: 2020-08-11

## 2020-08-11 RX ADMIN — REGADENOSON 0.4 MG: 0.08 INJECTION, SOLUTION INTRAVENOUS at 11:17

## 2020-08-12 ENCOUNTER — TELEPHONE (OUTPATIENT)
Dept: CARDIOLOGY | Facility: MEDICAL CENTER | Age: 75
End: 2020-08-12

## 2020-08-12 DIAGNOSIS — I10 ESSENTIAL HYPERTENSION: ICD-10-CM

## 2020-08-12 DIAGNOSIS — E78.5 DYSLIPIDEMIA: ICD-10-CM

## 2020-08-12 RX ORDER — METOPROLOL SUCCINATE 25 MG/1
25 TABLET, EXTENDED RELEASE ORAL DAILY
Qty: 90 TAB | Refills: 3 | Status: SHIPPED | OUTPATIENT
Start: 2020-08-12 | End: 2021-05-25

## 2020-08-12 NOTE — TELEPHONE ENCOUNTER
Spoke to patient and discussed results and recommendations. Patient verbalized understanding and is agreeable to plan.    Patient states Dr. Boyce at [facility] is performing procedure. Letter created and faxed to that facility.    Verified pharmacy with patient. Rx for Metoprolol ER 25mg sent.    Referral for pharmacotherapy service entered.    Message sent to Scheduling to contact patient and schedule f/u in one month with YASMIN.

## 2020-08-12 NOTE — TELEPHONE ENCOUNTER
Tell the patient that his stress test has a small abnormality.  He is cleared for neck surgery which can be rescheduled, ask the patient who the surgeon is and send a clearance; moderate risk  However he will require additional treatment for his heart  Metoprolol er 25 mg daily  He has to start a new cholesteral medication to prevent the abnormality from worsening  Refer the patient to our clinical pharmacist, who I was told is now available to start Repatha or Praluent    Schedule the patient with me in one month

## 2020-08-13 ENCOUNTER — HOSPITAL ENCOUNTER (OUTPATIENT)
Dept: LAB | Facility: MEDICAL CENTER | Age: 75
End: 2020-08-13
Attending: PHYSICIAN ASSISTANT
Payer: MEDICARE

## 2020-08-13 ENCOUNTER — NON-PROVIDER VISIT (OUTPATIENT)
Dept: MEDICAL GROUP | Facility: MEDICAL CENTER | Age: 75
End: 2020-08-13
Payer: MEDICARE

## 2020-08-13 DIAGNOSIS — E11.65 TYPE 2 DIABETES MELLITUS WITH HYPERGLYCEMIA, WITHOUT LONG-TERM CURRENT USE OF INSULIN (HCC): ICD-10-CM

## 2020-08-13 DIAGNOSIS — E78.2 MIXED HYPERLIPIDEMIA: Primary | ICD-10-CM

## 2020-08-13 LAB — PSA SERPL-MCNC: 0.35 NG/ML (ref 0–4)

## 2020-08-13 PROCEDURE — 84153 ASSAY OF PSA TOTAL: CPT

## 2020-08-13 PROCEDURE — 36415 COLL VENOUS BLD VENIPUNCTURE: CPT

## 2020-08-13 PROCEDURE — 99402 PREV MED CNSL INDIV APPRX 30: CPT | Performed by: INTERNAL MEDICINE

## 2020-08-13 RX ORDER — ROSUVASTATIN CALCIUM 5 MG/1
5 TABLET, COATED ORAL
Qty: 30 TAB | Refills: 5 | Status: SHIPPED | OUTPATIENT
Start: 2020-08-14 | End: 2020-08-14 | Stop reason: SDUPTHER

## 2020-08-13 NOTE — PROGRESS NOTES
[facility] - FollowUp Visit  Date of Service: 08/13/20    Milton Javed Jr. is here for follow up of dyslipidemia.    HPI  Pertinent Interval History since last visit:   ***  Current Prescription Lipid Lowering Medications - including dose:   Statin: {MEDICATIONS:9005}  Non-Statin: {MEDICATIONS:9005}  Current Lipid Lowering and Related Supplements:   {MEDICATIONS:9005}  Any Current Side Effects Potentially Related to Lipid Lowering therapy?   {Yes (Enter Details)/No:973344}  Current Adherence to Lipid Lowering Therapies:  {Therapy Adherence:498014}  Any Previous History of Statin Intolerance?   Yes, Details: atorvastatin and pravastatin unable to tolerate due to nausea. pt is open to trying another  Baseline Lipids Prior to Treatment:  {Baseline Lipids Available/Unavailable:066853}    SOCIAL HISTORY  Social History     Tobacco Use   Smoking Status Never Smoker   Smokeless Tobacco Never Used      Change in weight: Stable  Exercise habits: minimal exercise - primary caregiver for his wife  Diet: common adult    ROS  Physical Exam    DATA REVIEW  Most Recent Lipid Panel:   Lab Results   Component Value Date    CHOLSTRLTOT 194 06/19/2019    TRIGLYCERIDE 253 (H) 06/19/2019    HDL 33 (A) 06/19/2019     (H) 06/19/2019       Other Pertinent Blood Work:   Lab Results   Component Value Date    SODIUM 138 07/30/2020    POTASSIUM 4.0 07/30/2020    CHLORIDE 102 07/30/2020    CO2 22 07/30/2020    ANION 14.0 07/30/2020    GLUCOSE 183 (H) 07/30/2020    BUN 22 07/30/2020    CREATININE 1.40 07/30/2020    CALCIUM 9.3 07/30/2020    ASTSGOT 17 07/30/2020    ALTSGPT 23 07/30/2020    ALKPHOSPHAT 55 07/30/2020    TBILIRUBIN 0.4 07/30/2020    ALBUMIN 4.4 07/30/2020    AGRATIO 1.9 07/30/2020    TSHULTRASEN 3.980 06/19/2019       Other:  NA    Recent Imaging Studies:    Recent imaging studies, including stress test, were available in EMR and reviewed with patient at today's visit    ASSESSMENT AND PLAN  Patient Type,  check all that apply:   Secondary Prevention  Established Atherosclerotic Cardiovascular Disease (ASCVD)  {Yes (Enter Details)/No:818345}  Other Established (non-atherosclerotic) Vascular Disease, if Present:    {NONE:26228}  Evidence of Heterozygous Familial Hypercholesterolemia (FH): {Yes/Possible/No/Unknown:911410} (If yes, how was diagnosis made)  ACC/AHA Indication for Statin Therapy, brissa all that apply:   {Statin Therapy Indication:678010}   Calculated Risk for ASCVD, if applicable:  {Percentage:669836}  Other Significant Risk Markers, if any, brissa all that apply:  {Statin Therapy Other Indications:390700}  National Lipid Association (NLA) Goal (if applicable):  {NLA Goal:346133}  Lifestyle Recommendations From Today’s Visit:   {Lifestyle Recommendations:616075}  {Lifestyle Recommendations:210604}  Statin Recommendations from Today's Visit  ***  Non-Statin Medications Recommendations from Today’s Visit:   {MEDICATIONS:9005}  Indication for PCSK9 Inhibitor, if applicable:  {PCSK9 Inhibitor_ Indications:457923}  Supplements Recommended at this visit:  {MEDICATIONS:9005}  Recommendations for Other Cardiovascular Risk Factors, brissa all that apply:   {Risk Factors_Cardiovascular:199655}  Other Issues:  Family hx of CAD father 64 yo CABG, passed away at 75 after second attempt at CABG    Studies Ordered at Todays Visit:  {NONE:04160}   Blood Work Ordered At Today’s visit:   {NONE:69153}  Follow-Up:   {FOLLOWUP:71361}    Tomasa Snider, Cl[place]    CC:  Lilia Diego M.D.  No ref. provider found
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
## 2020-08-13 NOTE — Clinical Note
Dr. Bloch,     New hyperlipidemia treatment, I set his LDL goal at 70 based on his stress test and chest CT.     Thank you,    Tomasa Snider, PharmD

## 2020-08-13 NOTE — Clinical Note
Dr. Delgadillo,     Pt seen in [facility] lipid clinic. After discussion with patient regarding statin history, we started rosuvastatin 5 mg MWF, pt has follow up in 4 weeks. If he is unable to tolerate will proceed with PCSK9i.     Thank you,    Tomasa Snider, PharmD

## 2020-08-14 DIAGNOSIS — E78.5 DYSLIPIDEMIA: ICD-10-CM

## 2020-08-14 RX ORDER — ROSUVASTATIN CALCIUM 5 MG/1
5 TABLET, COATED ORAL
Qty: 30 TAB | Refills: 5 | Status: SHIPPED | OUTPATIENT
Start: 2020-08-14 | End: 2020-09-21 | Stop reason: SDUPTHER

## 2020-08-19 ENCOUNTER — HOSPITAL ENCOUNTER (OUTPATIENT)
Dept: LAB | Facility: MEDICAL CENTER | Age: 75
End: 2020-08-19
Attending: INTERNAL MEDICINE
Payer: MEDICARE

## 2020-08-19 DIAGNOSIS — E11.65 TYPE 2 DIABETES MELLITUS WITH HYPERGLYCEMIA, WITHOUT LONG-TERM CURRENT USE OF INSULIN (HCC): ICD-10-CM

## 2020-08-19 DIAGNOSIS — E78.2 MIXED HYPERLIPIDEMIA: ICD-10-CM

## 2020-08-19 LAB
EST. AVERAGE GLUCOSE BLD GHB EST-MCNC: 151 MG/DL
HBA1C MFR BLD: 6.9 % (ref 0–5.6)

## 2020-08-19 PROCEDURE — 36415 COLL VENOUS BLD VENIPUNCTURE: CPT

## 2020-08-19 PROCEDURE — 83036 HEMOGLOBIN GLYCOSYLATED A1C: CPT

## 2020-08-19 PROCEDURE — 83704 LIPOPROTEIN BLD QUAN PART: CPT

## 2020-08-19 PROCEDURE — 80061 LIPID PANEL: CPT | Mod: XU

## 2020-08-22 LAB
CHOLEST SERPL-MCNC: 193 MG/DL
HDL PARTICAL NO Q4363: 25.8 UMOL/L
HDL SIZE Q4361: 8.2 NM
HDLC SERPL-MCNC: 33 MG/DL (ref 40–59)
HLD.LARGE SERPL-SCNC: <2.8 UMOL/L
L VLDL PART NO Q4357: 8.9 NMOL/L
LDL SERPL QN: 20.2 NM
LDL SERPL-SCNC: 1862 NMOL/L
LDL SMALL SERPL-SCNC: >1085 NMOL/L
LDLC SERPL CALC-MCNC: 119 MG/DL
PATHOLOGY STUDY: ABNORMAL
TRIGL SERPL-MCNC: 203 MG/DL (ref 30–149)
VLDL SIZE Q4362: 54.6 NM

## 2020-09-10 ENCOUNTER — APPOINTMENT (OUTPATIENT)
Dept: MEDICAL GROUP | Facility: MEDICAL CENTER | Age: 75
End: 2020-09-10
Payer: MEDICARE

## 2020-09-14 ENCOUNTER — HOSPITAL ENCOUNTER (OUTPATIENT)
Dept: LAB | Facility: MEDICAL CENTER | Age: 75
End: 2020-09-14
Attending: INTERNAL MEDICINE
Payer: MEDICARE

## 2020-09-14 LAB
CHOLEST SERPL-MCNC: 190 MG/DL (ref 100–199)
FASTING STATUS PATIENT QL REPORTED: NORMAL
HDLC SERPL-MCNC: 37 MG/DL
LDLC SERPL CALC-MCNC: 110 MG/DL
TRIGL SERPL-MCNC: 216 MG/DL (ref 0–149)

## 2020-09-14 PROCEDURE — 36415 COLL VENOUS BLD VENIPUNCTURE: CPT

## 2020-09-14 PROCEDURE — 80061 LIPID PANEL: CPT

## 2020-09-21 ENCOUNTER — NON-PROVIDER VISIT (OUTPATIENT)
Dept: MEDICAL GROUP | Facility: MEDICAL CENTER | Age: 75
End: 2020-09-21
Payer: MEDICARE

## 2020-09-21 DIAGNOSIS — E78.5 DYSLIPIDEMIA: Primary | ICD-10-CM

## 2020-09-21 PROCEDURE — 99402 PREV MED CNSL INDIV APPRX 30: CPT | Performed by: INTERNAL MEDICINE

## 2020-09-21 RX ORDER — EZETIMIBE 10 MG/1
10 TABLET ORAL DAILY
Qty: 30 TAB | Refills: 1 | Status: SHIPPED | OUTPATIENT
Start: 2020-09-21 | End: 2020-11-20 | Stop reason: SDUPTHER

## 2020-09-21 RX ORDER — ROSUVASTATIN CALCIUM 5 MG/1
5 TABLET, COATED ORAL DAILY
Qty: 90 TAB | Refills: 1 | Status: SHIPPED
Start: 2020-09-21 | End: 2020-11-02

## 2020-09-21 NOTE — Clinical Note
Dr. Bloch,     Hyperlipidemia f/u. Pt started on Zetia, rosuvastatin increased to 5 mg daily and he was given schedule to titrate up every 2 weeks with a goal of 20 mg daily.     Thanks,     Tomasa

## 2020-09-21 NOTE — Clinical Note
Dr. Cody,    Pt seen in [facility] lipid clinic for follow up. He is tolerating rosuvastatin MWF, dose increased today to 5 mg daily with titration schedule given with goal of 20 mg daily.  Pt started on Zetia 10 mg daily. FOllow up in 6 weeks.     Thank you,     Tomasa Snider, PharmD

## 2020-09-21 NOTE — PROGRESS NOTES
[facility] - FollowUp Visit  Date of Service: 09/21/20  Start:2:37 PM  Stop: 3:09 PM  Milton Javed Jr. is here for follow up of dyslipidemia    HPI  Pertinent Interval History since last visit:   Tolerating rosuvastatin 5 mg MWF  Current Prescription Lipid Lowering Medications - including dose:   Statin: rosuvastatin 5 mg MWF  Non-Statin: None  Current Lipid Lowering and Related Supplements:   Fish oil 1000 mg once daily  Any Current Side Effects Potentially Related to Lipid Lowering therapy?   No  Current Adherence to Lipid Lowering Therapies:  Complete  Any Previous History of Statin Intolerance?   Yes, Details: Pravastain and atorvastatin    patient reports nausea with both that lead to him stopping it.   Baseline Lipids Prior to Treatment:   Shown here:  LDL-C: 119      nonHDL-C: 163          HDL-C: 33       Trigs: 203        Date: 8/19/2020  See baseline NMR panels results below:   Results for PAUL JAVED JR. (MRN [id number]) as of 9/21/2020 15:12   Ref. Range 8/19/2020 10:50   LDL Cholesterol Latest Ref Range: <=129 mg/dL 119   HDL Particle No. Latest Ref Range: >=33.0 umol/L 25.8 (L)   Small LDL Latest Ref Range: <=634 nmol/L >1085 (H)   L-HDL Particle No. Latest Ref Range: >=4.2 umol/L <2.8 (L)   VLDL Size Latest Ref Range: <=46.7 nm 54.6 (H)   L-VLDL Particle No. Latest Ref Range: <=2.7 nmol/L 8.9 (H)   HDL Size Latest Ref Range: >=8.9 nm 8.2 (L)   LDL Particle Size Latest Ref Range: >=20.7 nm 20.2 (L)   LDL Particle Latest Ref Range: <=1135 nmol/L 1862 (H)         SOCIAL HISTORY  Social History     Tobacco Use   Smoking Status Never Smoker   Smokeless Tobacco Never Used      Change in weight: Stable  Exercise habits: minimal exercise - started walking but still exerted very quickly  Diet: common adult - eating more vegetables. Reports he knows his portions are too large. Eliminated bagels in the morning, now just coffee, juice, and fruit.     ROS  Physical Exam    DATA REVIEW  Most  Recent Lipid Panel:   Lab Results   Component Value Date    CHOLSTRLTOT 190 09/14/2020    TRIGLYCERIDE 216 (H) 09/14/2020    HDL 37 (A) 09/14/2020     (H) 09/14/2020       Other Pertinent Blood Work:   Lab Results   Component Value Date    SODIUM 138 07/30/2020    POTASSIUM 4.0 07/30/2020    CHLORIDE 102 07/30/2020    CO2 22 07/30/2020    ANION 14.0 07/30/2020    GLUCOSE 183 (H) 07/30/2020    BUN 22 07/30/2020    CREATININE 1.40 07/30/2020    CALCIUM 9.3 07/30/2020    ASTSGOT 17 07/30/2020    ALTSGPT 23 07/30/2020    ALKPHOSPHAT 55 07/30/2020    TBILIRUBIN 0.4 07/30/2020    ALBUMIN 4.4 07/30/2020    AGRATIO 1.9 07/30/2020    TSHULTRASEN 3.980 06/19/2019       Other:  NA    Recent Imaging Studies:    None since last visit    ASSESSMENT AND PLAN  Patient Type, check all that apply:   Secondary Prevention  Established Atherosclerotic Cardiovascular Disease (ASCVD)  Yes, Details: Nuclear stress test showed small area of inducible ischemia inferior base on 8/11/2020  Other Established (non-atherosclerotic) Vascular Disease, if Present:    chest CT scan images which showed extensive multivessel coronary calcification. This was done 2/2020  Evidence of Heterozygous Familial Hypercholesterolemia (FH): Unknown (If yes, how was diagnosis made)  ACC/AHA Indication for Statin Therapy, brissa all that apply:   Established ASCVD: Indication for High intensity statin    Calculated Risk for ASCVD, if applicable:  N/A  Other Significant Risk Markers, if any, brissa all that apply:  None  National Lipid Association (NLA) Goal (if applicable):  LDL-C:   <70 mg/dL  Lifestyle Recommendations From Today’s Visit:   Eating Plan: Concentrate on  DASH/Med Style diet and encouraged pt to continue with diet changes. He has reduced carbohydrate intake. Eliminated carbs in the AM and replaced potatoes with vegetables.  Exercise: He report exercise intolerance, unfortunately had to postpone appt with cardiology to discuss futher.   Statin  Recommendations from Today's Visit  Increase Rosuvastatin to 5 mg daily, if able to tolerate for 2 weeks then increase to 10 mg daily, if able to tolerate for 2 weeks then increase to 15 mg daily. if able to tolerate for 2 weeks then increase to 20 mg daily.     Non-Statin Medications Recommendations from Today’s Visit:   Start Zetia 10 mg daily.   Indication for PCSK9 Inhibitor, if applicable:  Not currently indicated  Supplements Recommended at this visit:  None  Recommendations for Other Cardiovascular Risk Factors, brissa all that apply:   Diabetes/Impaired Fasting Glucose- A1C was 6.9%, he is currently on metformin 1000 mg BID. He is making lifestyle changes as well which should help to lower A1C  Other Issues:  none    Studies Ordered at Todays Visit:  None   Blood Work Ordered At Today’s visit:   Lipid profile prior to next appt  Follow-Up:   6 weeks    Tomasa Snider, PharmD     Agree with above   Bempedoic acid, pcsk9i and vascepa all possibilities for add on therapy if unable to get to goal on max tolerated dose of statin + zetia.    Michael J Bloch, MD  Certified Clinical Lipid Specialist  Medial Director, [facility]    Cc:       CC:  Emily A Smith, M.D. Dr. Swackhammer Dr. Michael Bloch

## 2020-10-23 NOTE — TELEPHONE ENCOUNTER
Received request via: Pharmacy    Was the patient seen in the last year in this department? Yes  7/30/20  Does the patient have an active prescription (recently filled or refills available) for medication(s) requested? No

## 2020-10-24 RX ORDER — NIFEDIPINE 90 MG/1
90 TABLET, FILM COATED, EXTENDED RELEASE ORAL
Qty: 100 TAB | Refills: 0 | Status: SHIPPED | OUTPATIENT
Start: 2020-10-24 | End: 2021-01-26

## 2020-11-02 ENCOUNTER — NON-PROVIDER VISIT (OUTPATIENT)
Dept: MEDICAL GROUP | Facility: MEDICAL CENTER | Age: 75
End: 2020-11-02
Payer: MEDICARE

## 2020-11-02 VITALS — DIASTOLIC BLOOD PRESSURE: 57 MMHG | HEART RATE: 66 BPM | SYSTOLIC BLOOD PRESSURE: 144 MMHG

## 2020-11-02 PROCEDURE — 99401 PREV MED CNSL INDIV APPRX 15: CPT | Performed by: INTERNAL MEDICINE

## 2020-11-02 RX ORDER — ROSUVASTATIN CALCIUM 20 MG/1
20 TABLET, COATED ORAL EVERY EVENING
Qty: 100 TAB | Refills: 1 | Status: SHIPPED | OUTPATIENT
Start: 2020-11-02 | End: 2021-05-28 | Stop reason: SDUPTHER

## 2020-11-02 NOTE — Clinical Note
Dr. Delgadillo,     Pt seen for lipid f/u. He was able to tolerate rosuvastatin 20 mg daily and ezetimbe. He is to get lipid panel this week to determine if he needs another agent.     Thank you,    Tomasa Snider, PharmD

## 2020-11-02 NOTE — PROGRESS NOTES
[facility] Visit  Date of Service: 11/02/20  Start: 7:27  Stop:7:43    Milton Javed Jr. is here for follow up of dyslipidemia    HPI  Pertinent Interval History since last visit:   He has been able to increase rosuvastatin 20 mg daily and he started Zetia, he unfortunately forgot to get labs but will go this week.   Current Prescription Lipid Lowering Medications - including dose:   Statin: rosuvastatin 20 mg daily, Zetia 10 mg daily  Non-Statin: zetia 10 mg daily  Current Lipid Lowering and Related Supplements:   Fish oil  Any Current Side Effects Potentially Related to Lipid Lowering therapy?   No  Current Adherence to Lipid Lowering Therapies:  Complete   Any Previous History of Statin Intolerance?   Yes, Details: Pravastain and atorvastatin    patient reports nausea with both that lead to him stopping it.   Baseline Lipids Prior to Treatment:   Shown here:  LDL-C: 119      nonHDL-C: 163          HDL-C: 33       Trigs: 203        Date: 8/19/2020  See baseline NMR panels results below:   Results for PAUL JAVED JR. (MRN [id number]) as of 9/21/2020 15:12    Ref. Range 8/19/2020 10:50   LDL Cholesterol Latest Ref Range: <=129 mg/dL 119   HDL Particle No. Latest Ref Range: >=33.0 umol/L 25.8 (L)   Small LDL Latest Ref Range: <=634 nmol/L >1085 (H)   L-HDL Particle No. Latest Ref Range: >=4.2 umol/L <2.8 (L)   VLDL Size Latest Ref Range: <=46.7 nm 54.6 (H)   L-VLDL Particle No. Latest Ref Range: <=2.7 nmol/L 8.9 (H)   HDL Size Latest Ref Range: >=8.9 nm 8.2 (L)   LDL Particle Size Latest Ref Range: >=20.7 nm 20.2 (L)   LDL Particle Latest Ref Range: <=1135 nmol/L 1862 (H)         SOCIAL HISTORY  Social History     Tobacco Use   Smoking Status Never Smoker   Smokeless Tobacco Never Used      Change in weight: Stable  Exercise habits: minimal exercise - taking a walk daily, slowly improving  Diet: common adult    ROS  Physical Exam    DATA REVIEW  Most Recent Lipid Panel:   Lab Results    Component Value Date    CHOLSTRLTOT 190 09/14/2020    TRIGLYCERIDE 216 (H) 09/14/2020    HDL 37 (A) 09/14/2020     (H) 09/14/2020       Other Pertinent Blood Work:   Lab Results   Component Value Date    SODIUM 138 07/30/2020    POTASSIUM 4.0 07/30/2020    CHLORIDE 102 07/30/2020    CO2 22 07/30/2020    ANION 14.0 07/30/2020    GLUCOSE 183 (H) 07/30/2020    BUN 22 07/30/2020    CREATININE 1.40 07/30/2020    CALCIUM 9.3 07/30/2020    ASTSGOT 17 07/30/2020    ALTSGPT 23 07/30/2020    ALKPHOSPHAT 55 07/30/2020    TBILIRUBIN 0.4 07/30/2020    ALBUMIN 4.4 07/30/2020    AGRATIO 1.9 07/30/2020    TSHULTRASEN 3.980 06/19/2019       Other:  NA    Recent Imaging Studies:    None since last visit    ASSESSMENT AND PLAN  Patient Type, check all that apply:   Secondary Prevention  Established Atherosclerotic Cardiovascular Disease (ASCVD)  Yes, Details: Nuclear stress test showed small area of inducible ischemia inferior base on 8/11/2020  Other Established (non-atherosclerotic) Vascular Disease, if Present:    chest CT scan images which showed extensive multivessel coronary calcification. This was done 2/2020  Evidence of Heterozygous Familial Hypercholesterolemia (FH): Unknown (If yes, how was diagnosis made)  ACC/AHA Indication for Statin Therapy, brissa all that apply:   Established ASCVD: Indication for High intensity statin    Calculated Risk for ASCVD, if applicable:  N/A  Other Significant Risk Markers, if any, brissa all that apply:  None  National Lipid Association (NLA) Goal (if applicable):  LDL-C:   <70 mg/dL  Lifestyle Recommendations From Today’s Visit:   Eating Plan: Concentrate on  eating healthier snacks. pt report he is snacking a lot at night, eating crackers and peanut butter. Advised pt to try to snack on fruit, nuts, cheese or to buy prepackaged 100 KCal bags to help limit portions. He has stuck with a healthy breakfast. He is trying to walk daily but he is limited due to pt feels SOB and leg  weakness. He is going to discuss with cardiology    Statin Recommendations from Today's Visit  Continue rosuvastatin 20 mg daily  Non-Statin Medications Recommendations from Today’s Visit:   Continue Zetia 10 mg daily  Indication for PCSK9 Inhibitor, if applicable:  Not currently indicated  Supplements Recommended at this visit:  Continue fish oil  Recommendations for Other Cardiovascular Risk Factors, brissa all that apply:   Diabetes/Impaired Fasting Glucose- A1C was 6.9%, he is currently on metformin 1000 mg BID. 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 Encouraged pt to try to make small changes and stick with it like he has been.     Studies Ordered at Todays Visit:  none   Blood Work Ordered At Today’s visit:   Lipid panel  Follow-Up:   12 weeks or sooner depending on LIPID panel results    Tomasa Snider, NicolásD    CC:  Lilia Diego M.D.  Dr. Michael Bloch Dr. Swackhamer

## 2020-11-13 ENCOUNTER — OFFICE VISIT (OUTPATIENT)
Dept: CARDIOLOGY | Facility: MEDICAL CENTER | Age: 75
End: 2020-11-13
Payer: MEDICARE

## 2020-11-13 ENCOUNTER — HOSPITAL ENCOUNTER (OUTPATIENT)
Dept: LAB | Facility: MEDICAL CENTER | Age: 75
End: 2020-11-13
Attending: INTERNAL MEDICINE
Payer: MEDICARE

## 2020-11-13 VITALS
SYSTOLIC BLOOD PRESSURE: 160 MMHG | HEART RATE: 82 BPM | BODY MASS INDEX: 45.17 KG/M2 | WEIGHT: 287.8 LBS | HEIGHT: 67 IN | DIASTOLIC BLOOD PRESSURE: 80 MMHG | OXYGEN SATURATION: 95 %

## 2020-11-13 DIAGNOSIS — E78.5 DYSLIPIDEMIA: ICD-10-CM

## 2020-11-13 DIAGNOSIS — I10 ESSENTIAL HYPERTENSION: ICD-10-CM

## 2020-11-13 DIAGNOSIS — R93.1 ABNORMAL NUCLEAR CARDIAC IMAGING TEST: ICD-10-CM

## 2020-11-13 DIAGNOSIS — E66.01 MORBID OBESITY (HCC): ICD-10-CM

## 2020-11-13 DIAGNOSIS — I25.10 CORONARY ARTERY CALCIFICATION SEEN ON CAT SCAN: ICD-10-CM

## 2020-11-13 DIAGNOSIS — G47.33 OSA ON CPAP: ICD-10-CM

## 2020-11-13 PROCEDURE — 99214 OFFICE O/P EST MOD 30 MIN: CPT | Performed by: INTERNAL MEDICINE

## 2020-11-13 RX ORDER — OXYCODONE HYDROCHLORIDE 5 MG/1
TABLET ORAL
COMMUNITY
End: 2021-02-25

## 2020-11-13 ASSESSMENT — FIBROSIS 4 INDEX: FIB4 SCORE: 1.08

## 2020-11-13 ASSESSMENT — ENCOUNTER SYMPTOMS
MYALGIAS: 0
SHORTNESS OF BREATH: 0
COUGH: 0
PALPITATIONS: 0
LOSS OF CONSCIOUSNESS: 0
DIZZINESS: 0
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
## 2020-11-17 ENCOUNTER — HOSPITAL ENCOUNTER (OUTPATIENT)
Dept: LAB | Facility: MEDICAL CENTER | Age: 75
End: 2020-11-17
Attending: INTERNAL MEDICINE
Payer: MEDICARE

## 2020-11-17 LAB
CHOLEST SERPL-MCNC: 135 MG/DL (ref 100–199)
FASTING STATUS PATIENT QL REPORTED: NORMAL
HDLC SERPL-MCNC: 33 MG/DL
LDLC SERPL CALC-MCNC: 65 MG/DL
TRIGL SERPL-MCNC: 184 MG/DL (ref 0–149)

## 2020-11-17 PROCEDURE — 36415 COLL VENOUS BLD VENIPUNCTURE: CPT

## 2020-11-17 PROCEDURE — 80061 LIPID PANEL: CPT

## 2020-11-19 ENCOUNTER — TELEPHONE (OUTPATIENT)
Dept: MEDICAL GROUP | Facility: MEDICAL CENTER | Age: 75
End: 2020-11-19

## 2020-11-19 NOTE — TELEPHONE ENCOUNTER
Reviewed pts most recent lipid panel.     Lab Results   Component Value Date/Time    CHOLSTRLTOT 135 11/17/2020 1335    TRIGLYCERIDE 184 (H) 11/17/2020 1335    HDL 33 (A) 11/17/2020 1335    LDL 65 11/17/2020 1335     LDL is below 70 on Zetia and Crestor, will continue current lipid regimen. Follow up as planned.     Notified pt via [employer].     Nicolás MorfinD

## 2020-11-20 DIAGNOSIS — E78.5 DYSLIPIDEMIA: ICD-10-CM

## 2020-11-20 RX ORDER — EZETIMIBE 10 MG/1
10 TABLET ORAL DAILY
Qty: 30 TAB | Refills: 6 | Status: SHIPPED | OUTPATIENT
Start: 2020-11-20 | End: 2021-06-02 | Stop reason: SDUPTHER

## 2020-12-22 DIAGNOSIS — E11.65 TYPE 2 DIABETES MELLITUS WITH HYPERGLYCEMIA, WITHOUT LONG-TERM CURRENT USE OF INSULIN (HCC): ICD-10-CM

## 2020-12-22 DIAGNOSIS — I10 ESSENTIAL HYPERTENSION: ICD-10-CM

## 2020-12-22 RX ORDER — LOSARTAN POTASSIUM 50 MG/1
50 TABLET ORAL
Qty: 100 TAB | Refills: 1 | Status: SHIPPED | OUTPATIENT
Start: 2020-12-22 | End: 2021-07-09 | Stop reason: SDUPTHER

## 2020-12-26 DIAGNOSIS — E11.65 TYPE 2 DIABETES MELLITUS WITH HYPERGLYCEMIA, WITHOUT LONG-TERM CURRENT USE OF INSULIN (HCC): ICD-10-CM

## 2020-12-28 RX ORDER — METFORMIN HYDROCHLORIDE 500 MG/1
TABLET, EXTENDED RELEASE ORAL
Qty: 360 TAB | Refills: 1 | Status: SHIPPED | OUTPATIENT
Start: 2020-12-28 | End: 2021-05-14

## 2020-12-28 NOTE — TELEPHONE ENCOUNTER
Received request via: Pharmacy    Was the patient seen in the last year in this department? Yes  7/30/2020  Does the patient have an active prescription (recently filled or refills available) for medication(s) requested? No

## 2021-01-05 ENCOUNTER — PATIENT MESSAGE (OUTPATIENT)
Dept: MEDICAL GROUP | Facility: LAB | Age: 76
End: 2021-01-05

## 2021-01-05 DIAGNOSIS — I10 ESSENTIAL HYPERTENSION: Primary | ICD-10-CM

## 2021-01-05 NOTE — PATIENT COMMUNICATION
Received request via: Patient    Was the patient seen in the last year in this department? Yes 7/30/20    Does the patient have an active prescription (recently filled or refills available) for medication(s) requested? No

## 2021-01-05 NOTE — TELEPHONE ENCOUNTER
From: Milton Javed Jr.  To: Lilia Diego M.D.  Sent: 1/5/2021 11:31 AM PST  Subject: Prescription Question    [facility] Dr. Diego    Happy New Year!    I need a prescription refill for:  triamterene-hydrochlorothiazide 75-50 MG Tabs  Commonly known as: MAXZIDE 75-50    [facility]    Thank You!  William Javed

## 2021-01-06 RX ORDER — TRIAMTERENE AND HYDROCHLOROTHIAZIDE 75; 50 MG/1; MG/1
1 TABLET ORAL
Qty: 90 TAB | Refills: 3 | Status: SHIPPED | OUTPATIENT
Start: 2021-01-06 | End: 2021-07-11 | Stop reason: SDUPTHER

## 2021-01-15 DIAGNOSIS — Z23 NEED FOR VACCINATION: ICD-10-CM

## 2021-01-25 ENCOUNTER — APPOINTMENT (OUTPATIENT)
Dept: MEDICAL GROUP | Facility: MEDICAL CENTER | Age: 76
End: 2021-01-25
Payer: MEDICARE

## 2021-02-03 DIAGNOSIS — E03.9 ACQUIRED HYPOTHYROIDISM: ICD-10-CM

## 2021-02-03 RX ORDER — LEVOTHYROXINE SODIUM 0.07 MG/1
TABLET ORAL
Qty: 90 TAB | Refills: 0 | Status: SHIPPED | OUTPATIENT
Start: 2021-02-03 | End: 2021-06-02 | Stop reason: SDUPTHER

## 2021-02-03 NOTE — TELEPHONE ENCOUNTER
Received request via: Pharmacy    Was the patient seen in the last year in this department? Yes  11/13/2020  Does the patient have an active prescription (recently filled or refills available) for medication(s) requested? No

## 2021-02-18 ENCOUNTER — OFFICE VISIT (OUTPATIENT)
Dept: MEDICAL GROUP | Facility: LAB | Age: 76
End: 2021-02-18
Payer: MEDICARE

## 2021-02-18 VITALS
DIASTOLIC BLOOD PRESSURE: 64 MMHG | HEIGHT: 69 IN | SYSTOLIC BLOOD PRESSURE: 120 MMHG | RESPIRATION RATE: 16 BRPM | BODY MASS INDEX: 43.1 KG/M2 | WEIGHT: 291 LBS

## 2021-02-18 DIAGNOSIS — F32.1 CURRENT MODERATE EPISODE OF MAJOR DEPRESSIVE DISORDER WITHOUT PRIOR EPISODE (HCC): ICD-10-CM

## 2021-02-18 DIAGNOSIS — Z12.11 SCREENING FOR COLORECTAL CANCER: ICD-10-CM

## 2021-02-18 DIAGNOSIS — I77.810 DILATED AORTIC ROOT (HCC): ICD-10-CM

## 2021-02-18 DIAGNOSIS — Z00.00 MEDICARE ANNUAL WELLNESS VISIT, SUBSEQUENT: ICD-10-CM

## 2021-02-18 DIAGNOSIS — Z12.12 SCREENING FOR COLORECTAL CANCER: ICD-10-CM

## 2021-02-18 DIAGNOSIS — I10 ESSENTIAL HYPERTENSION: ICD-10-CM

## 2021-02-18 DIAGNOSIS — E03.9 ACQUIRED HYPOTHYROIDISM: ICD-10-CM

## 2021-02-18 DIAGNOSIS — E66.01 CLASS 3 SEVERE OBESITY WITHOUT SERIOUS COMORBIDITY WITH BODY MASS INDEX (BMI) OF 40.0 TO 44.9 IN ADULT, UNSPECIFIED OBESITY TYPE (HCC): ICD-10-CM

## 2021-02-18 DIAGNOSIS — E11.65 TYPE 2 DIABETES MELLITUS WITH HYPERGLYCEMIA, WITHOUT LONG-TERM CURRENT USE OF INSULIN (HCC): ICD-10-CM

## 2021-02-18 DIAGNOSIS — Z13.0 SCREENING FOR DEFICIENCY ANEMIA: ICD-10-CM

## 2021-02-18 DIAGNOSIS — E78.2 MIXED HYPERLIPIDEMIA: ICD-10-CM

## 2021-02-18 DIAGNOSIS — Z85.46 PERSONAL HISTORY OF PROSTATE CANCER: ICD-10-CM

## 2021-02-18 PROBLEM — T73.3XXA FATIGUE DUE TO EXCESSIVE EXERTION: Status: RESOLVED | Noted: 2020-08-03 | Resolved: 2021-02-18

## 2021-02-18 PROBLEM — E78.5 DYSLIPIDEMIA: Status: RESOLVED | Noted: 2020-08-03 | Resolved: 2021-02-18

## 2021-02-18 PROCEDURE — G0439 PPPS, SUBSEQ VISIT: HCPCS | Performed by: FAMILY MEDICINE

## 2021-02-18 ASSESSMENT — PATIENT HEALTH QUESTIONNAIRE - PHQ9
CLINICAL INTERPRETATION OF PHQ2 SCORE: 2
SUM OF ALL RESPONSES TO PHQ QUESTIONS 1-9: 6
5. POOR APPETITE OR OVEREATING: 3 - NEARLY EVERY DAY

## 2021-02-18 ASSESSMENT — ACTIVITIES OF DAILY LIVING (ADL): BATHING_REQUIRES_ASSISTANCE: 0

## 2021-02-18 ASSESSMENT — VISUAL ACUITY
OD_CC: 20/40 -1
OS_CC: 20/25 -

## 2021-02-18 ASSESSMENT — FIBROSIS 4 INDEX: FIB4 SCORE: 1.08

## 2021-02-18 ASSESSMENT — ENCOUNTER SYMPTOMS: GENERAL WELL-BEING: GOOD

## 2021-02-18 NOTE — PATIENT INSTRUCTIONS
Preventive Care 65 Years and Older, Male  Preventive care refers to lifestyle choices and visits with your health care provider that can promote health and wellness. This includes:  · A yearly physical exam. This is also called an annual well check.  · Regular dental and eye exams.  · Immunizations.  · Screening for certain conditions.  · Healthy lifestyle choices, such as diet and exercise.  What can I expect for my preventive care visit?  Physical exam  Your health care provider will check:  · Height and weight. These may be used to calculate body mass index (BMI), which is a measurement that tells if you are at a healthy weight.  · Heart rate and blood pressure.  · Your skin for abnormal spots.  Counseling  Your health care provider may ask you questions about:  · Alcohol, tobacco, and drug use.  · Emotional well-being.  · Home and relationship well-being.  · Sexual activity.  · Eating habits.  · History of falls.  · Memory and ability to understand (cognition).  · Work and work environment.  What immunizations do I need?    Influenza (flu) vaccine  · This is recommended every year.  Tetanus, diphtheria, and pertussis (Tdap) vaccine  · You may need a Td booster every 10 years.  Varicella (chickenpox) vaccine  · You may need this vaccine if you have not already been vaccinated.  Zoster (shingles) vaccine  · You may need this after age 60.  Pneumococcal conjugate (PCV13) vaccine  · One dose is recommended after age 65.  Pneumococcal polysaccharide (PPSV23) vaccine  · One dose is recommended after age 65.  Measles, mumps, and rubella (MMR) vaccine  · You may need at least one dose of MMR if you were born in 1957 or later. You may also need a second dose.  Meningococcal conjugate (MenACWY) vaccine  · You may need this if you have certain conditions.  Hepatitis A vaccine  · You may need this if you have certain conditions or if you travel or work in places where you may be exposed to hepatitis A.  Hepatitis B  vaccine  · You may need this if you have certain conditions or if you travel or work in places where you may be exposed to hepatitis B.  Haemophilus influenzae type b (Hib) vaccine  · You may need this if you have certain conditions.  You may receive vaccines as individual doses or as more than one vaccine together in one shot (combination vaccines). Talk with your health care provider about the risks and benefits of combination vaccines.  What tests do I need?  Blood tests  · Lipid and cholesterol levels. These may be checked every 5 years, or more frequently depending on your overall health.  · Hepatitis C test.  · Hepatitis B test.  Screening  · Lung cancer screening. You may have this screening every year starting at age 55 if you have a 30-pack-year history of smoking and currently smoke or have quit within the past 15 years.  · Colorectal cancer screening. All adults should have this screening starting at age 50 and continuing until age 75. Your health care provider may recommend screening at age 45 if you are at increased risk. You will have tests every 1-10 years, depending on your results and the type of screening test.  · Prostate cancer screening. Recommendations will vary depending on your family history and other risks.  · Diabetes screening. This is done by checking your blood sugar (glucose) after you have not eaten for a while (fasting). You may have this done every 1-3 years.  · Abdominal aortic aneurysm (AAA) screening. You may need this if you are a current or former smoker.  · Sexually transmitted disease (STD) testing.  Follow these instructions at home:  Eating and drinking  · Eat a diet that includes fresh fruits and vegetables, whole grains, lean protein, and low-fat dairy products. Limit your intake of foods with high amounts of sugar, saturated fats, and salt.  · Take vitamin and mineral supplements as recommended by your health care provider.  · Do not drink alcohol if your health care  provider tells you not to drink.  · If you drink alcohol:  ? Limit how much you have to 0-2 drinks a day.  ? Be aware of how much alcohol is in your drink. In the [place] one drink equals one 12 oz bottle of beer (355 mL), one 5 oz glass of wine (148 mL), or one 1½ oz glass of hard liquor (44 mL).  Lifestyle  · Take daily care of your teeth and gums.  · Stay active. Exercise for at least 30 minutes on 5 or more days each week.  · Do not use any products that contain nicotine or tobacco, such as cigarettes, e-cigarettes, and chewing tobacco. If you need help quitting, ask your health care provider.  · If you are sexually active, practice safe sex. Use a condom or other form of protection to prevent STIs (sexually transmitted infections).  · Talk with your health care provider about taking a low-dose aspirin or statin.  What's next?  · Visit your health care provider once a year for a well check visit.  · Ask your health care provider how often you should have your eyes and teeth checked.  · Stay up to date on all vaccines.  This information is not intended to replace advice given to you by your health care provider. Make sure you discuss any questions you have with your health care provider.  Document Released: 01/13/2017 Document Revised: 12/12/2019 Document Reviewed: 12/12/2019  Elsevier Patient Education © 2020 [employer]
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
## 2021-02-26 DIAGNOSIS — F32.1 CURRENT MODERATE EPISODE OF MAJOR DEPRESSIVE DISORDER WITHOUT PRIOR EPISODE (HCC): ICD-10-CM

## 2021-02-26 NOTE — TELEPHONE ENCOUNTER
Received request via: Pharmacy    Was the patient seen in the last year in this department? Yes  2/18/21  Does the patient have an active prescription (recently filled or refills available) for medication(s) requested? No

## 2021-03-01 RX ORDER — ESCITALOPRAM OXALATE 20 MG/1
20 TABLET ORAL
Qty: 90 TABLET | Refills: 3 | Status: SHIPPED | OUTPATIENT
Start: 2021-03-01 | End: 2021-08-30 | Stop reason: SDUPTHER

## 2021-03-16 ENCOUNTER — PATIENT OUTREACH (OUTPATIENT)
Dept: HEALTH INFORMATION MANAGEMENT | Facility: OTHER | Age: 76
End: 2021-03-16

## 2021-05-13 DIAGNOSIS — E11.65 TYPE 2 DIABETES MELLITUS WITH HYPERGLYCEMIA, WITHOUT LONG-TERM CURRENT USE OF INSULIN (HCC): ICD-10-CM

## 2021-05-14 NOTE — TELEPHONE ENCOUNTER
Received request via: Pharmacy    Was the patient seen in the last year in this department? Yes  2/18/2021LOV  Does the patient have an active prescription (recently filled or refills available) for medication(s) requested? No

## 2021-05-18 RX ORDER — METFORMIN HYDROCHLORIDE 500 MG/1
TABLET, EXTENDED RELEASE ORAL
Qty: 360 TABLET | Refills: 1 | Status: SHIPPED | OUTPATIENT
Start: 2021-05-18 | End: 2021-09-24 | Stop reason: SDUPTHER

## 2021-05-25 DIAGNOSIS — I10 ESSENTIAL HYPERTENSION: ICD-10-CM

## 2021-05-25 RX ORDER — ROSUVASTATIN CALCIUM 20 MG/1
20 TABLET, COATED ORAL EVERY EVENING
Qty: 100 TABLET | Refills: 1 | OUTPATIENT
Start: 2021-05-25

## 2021-05-26 RX ORDER — METOPROLOL SUCCINATE 25 MG/1
TABLET, EXTENDED RELEASE ORAL
Qty: 90 TABLET | Refills: 1 | Status: SHIPPED | OUTPATIENT
Start: 2021-05-26 | End: 2021-08-25 | Stop reason: SDUPTHER

## 2021-05-28 ENCOUNTER — OFFICE VISIT (OUTPATIENT)
Dept: CARDIOLOGY | Facility: MEDICAL CENTER | Age: 76
End: 2021-05-28
Payer: MEDICARE

## 2021-05-28 VITALS
BODY MASS INDEX: 45.04 KG/M2 | OXYGEN SATURATION: 97 % | DIASTOLIC BLOOD PRESSURE: 62 MMHG | HEIGHT: 67 IN | WEIGHT: 287 LBS | SYSTOLIC BLOOD PRESSURE: 120 MMHG | RESPIRATION RATE: 14 BRPM | HEART RATE: 65 BPM

## 2021-05-28 DIAGNOSIS — I10 ESSENTIAL HYPERTENSION: ICD-10-CM

## 2021-05-28 DIAGNOSIS — I77.810 DILATED AORTIC ROOT (HCC): ICD-10-CM

## 2021-05-28 DIAGNOSIS — I25.10 CORONARY ARTERY CALCIFICATION SEEN ON CAT SCAN: ICD-10-CM

## 2021-05-28 DIAGNOSIS — R93.1 ABNORMAL NUCLEAR CARDIAC IMAGING TEST: ICD-10-CM

## 2021-05-28 DIAGNOSIS — E78.2 MIXED HYPERLIPIDEMIA: ICD-10-CM

## 2021-05-28 DIAGNOSIS — R94.31 ABNORMAL EKG: ICD-10-CM

## 2021-05-28 DIAGNOSIS — Z86.79 HISTORY OF RHEUMATIC FEVER: ICD-10-CM

## 2021-05-28 DIAGNOSIS — Z78.9 STATIN INTOLERANCE: ICD-10-CM

## 2021-05-28 PROCEDURE — 99214 OFFICE O/P EST MOD 30 MIN: CPT | Performed by: NURSE PRACTITIONER

## 2021-05-28 RX ORDER — TRIAMTERENE AND HYDROCHLOROTHIAZIDE 37.5; 25 MG/1; MG/1
1 TABLET ORAL DAILY
Qty: 100 TABLET | Refills: 3 | Status: SHIPPED | OUTPATIENT
Start: 2021-05-28 | End: 2021-05-28 | Stop reason: SINTOL

## 2021-05-28 RX ORDER — ROSUVASTATIN CALCIUM 20 MG/1
20 TABLET, COATED ORAL EVERY EVENING
Qty: 100 TABLET | Refills: 3 | Status: SHIPPED | OUTPATIENT
Start: 2021-05-28 | End: 2021-06-04 | Stop reason: SDUPTHER

## 2021-05-28 RX ORDER — OXYCODONE HYDROCHLORIDE 5 MG/1
5 TABLET ORAL EVERY 4 HOURS PRN
COMMUNITY

## 2021-05-28 ASSESSMENT — ENCOUNTER SYMPTOMS
ORTHOPNEA: 0
PND: 0
VOMITING: 0
DIZZINESS: 1
SHORTNESS OF BREATH: 0
FEVER: 0
SPUTUM PRODUCTION: 0
WHEEZING: 0
PALPITATIONS: 0
CLAUDICATION: 0
NAUSEA: 0
HEADACHES: 0
COUGH: 0
CHILLS: 0
HEMOPTYSIS: 0

## 2021-05-28 ASSESSMENT — FIBROSIS 4 INDEX: FIB4 SCORE: 1.09
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
## 2021-06-02 ENCOUNTER — PATIENT MESSAGE (OUTPATIENT)
Dept: MEDICAL GROUP | Facility: LAB | Age: 76
End: 2021-06-02

## 2021-06-02 DIAGNOSIS — E03.9 ACQUIRED HYPOTHYROIDISM: ICD-10-CM

## 2021-06-02 DIAGNOSIS — E78.5 DYSLIPIDEMIA: ICD-10-CM

## 2021-06-02 RX ORDER — LEVOTHYROXINE SODIUM 0.07 MG/1
TABLET ORAL
Qty: 30 TABLET | Refills: 0 | Status: SHIPPED | OUTPATIENT
Start: 2021-06-02 | End: 2021-06-30

## 2021-06-02 RX ORDER — EZETIMIBE 10 MG/1
10 TABLET ORAL DAILY
Qty: 30 TABLET | Refills: 0 | Status: SHIPPED | OUTPATIENT
Start: 2021-06-02 | End: 2021-06-30

## 2021-06-02 NOTE — PATIENT COMMUNICATION
Received request via: Patient    Was the patient seen in the last year in this department? Yes  2/18/2021  Does the patient have an active prescription (recently filled or refills available) for medication(s) requested? No

## 2021-06-02 NOTE — TELEPHONE ENCOUNTER
----- Message from Milton Javed Jr. sent at 6/2/2021  2:57 PM PDT -----  Regarding: Prescription Question  Contact: [phone]  Benigno Diego  Hope all is well with you!  I now use a different pharmacy, ...[id number] - [place], [place] - [address] AT [address] [facility] [address]  [address] [id number] [phone]    I need the following refills:    Levothyroxine  Sodium  75 MCG Tabs.   And  Ezetimibe 10MG  Tabs    Thank You!  William Javed

## 2021-06-04 DIAGNOSIS — E78.5 DYSLIPIDEMIA: ICD-10-CM

## 2021-06-04 RX ORDER — ROSUVASTATIN CALCIUM 20 MG/1
20 TABLET, COATED ORAL EVERY EVENING
Qty: 100 TABLET | Refills: 0 | Status: SHIPPED | OUTPATIENT
Start: 2021-06-04 | End: 2021-08-28 | Stop reason: SDUPTHER

## 2021-06-23 ENCOUNTER — TELEPHONE (OUTPATIENT)
Dept: MEDICAL GROUP | Facility: LAB | Age: 76
End: 2021-06-23

## 2021-06-23 ENCOUNTER — HOSPITAL ENCOUNTER (OUTPATIENT)
Dept: LAB | Facility: MEDICAL CENTER | Age: 76
End: 2021-06-23
Attending: FAMILY MEDICINE
Payer: MEDICARE

## 2021-06-23 DIAGNOSIS — Z13.0 SCREENING FOR DEFICIENCY ANEMIA: ICD-10-CM

## 2021-06-23 DIAGNOSIS — E11.65 TYPE 2 DIABETES MELLITUS WITH HYPERGLYCEMIA, WITHOUT LONG-TERM CURRENT USE OF INSULIN (HCC): ICD-10-CM

## 2021-06-23 DIAGNOSIS — E03.9 ACQUIRED HYPOTHYROIDISM: ICD-10-CM

## 2021-06-23 DIAGNOSIS — I10 ESSENTIAL HYPERTENSION: ICD-10-CM

## 2021-06-23 LAB
ALBUMIN SERPL BCP-MCNC: 4.4 G/DL (ref 3.2–4.9)
ALBUMIN/GLOB SERPL: 1.9 G/DL
ALP SERPL-CCNC: 63 U/L (ref 30–99)
ALT SERPL-CCNC: 34 U/L (ref 2–50)
ANION GAP SERPL CALC-SCNC: 14 MMOL/L (ref 7–16)
AST SERPL-CCNC: 21 U/L (ref 12–45)
BASOPHILS # BLD AUTO: 0.5 % (ref 0–1.8)
BASOPHILS # BLD: 0.03 K/UL (ref 0–0.12)
BILIRUB SERPL-MCNC: 0.5 MG/DL (ref 0.1–1.5)
BUN SERPL-MCNC: 26 MG/DL (ref 8–22)
CALCIUM SERPL-MCNC: 9.2 MG/DL (ref 8.4–10.2)
CHLORIDE SERPL-SCNC: 103 MMOL/L (ref 96–112)
CO2 SERPL-SCNC: 19 MMOL/L (ref 20–33)
CREAT SERPL-MCNC: 1.16 MG/DL (ref 0.5–1.4)
EOSINOPHIL # BLD AUTO: 0.3 K/UL (ref 0–0.51)
EOSINOPHIL NFR BLD: 5.1 % (ref 0–6.9)
ERYTHROCYTE [DISTWIDTH] IN BLOOD BY AUTOMATED COUNT: 41.8 FL (ref 35.9–50)
EST. AVERAGE GLUCOSE BLD GHB EST-MCNC: 148 MG/DL
GLOBULIN SER CALC-MCNC: 2.3 G/DL (ref 1.9–3.5)
GLUCOSE SERPL-MCNC: 134 MG/DL (ref 65–99)
HBA1C MFR BLD: 6.8 % (ref 4–5.6)
HCT VFR BLD AUTO: 35.5 % (ref 42–52)
HGB BLD-MCNC: 12.7 G/DL (ref 14–18)
IMM GRANULOCYTES # BLD AUTO: 0.02 K/UL (ref 0–0.11)
IMM GRANULOCYTES NFR BLD AUTO: 0.3 % (ref 0–0.9)
LYMPHOCYTES # BLD AUTO: 1.39 K/UL (ref 1–4.8)
LYMPHOCYTES NFR BLD: 23.4 % (ref 22–41)
MCH RBC QN AUTO: 33.2 PG (ref 27–33)
MCHC RBC AUTO-ENTMCNC: 35.8 G/DL (ref 33.7–35.3)
MCV RBC AUTO: 92.9 FL (ref 81.4–97.8)
MONOCYTES # BLD AUTO: 0.46 K/UL (ref 0–0.85)
MONOCYTES NFR BLD AUTO: 7.7 % (ref 0–13.4)
NEUTROPHILS # BLD AUTO: 3.74 K/UL (ref 1.82–7.42)
NEUTROPHILS NFR BLD: 63 % (ref 44–72)
NRBC # BLD AUTO: 0 K/UL
NRBC BLD-RTO: 0 /100 WBC
PLATELET # BLD AUTO: 185 K/UL (ref 164–446)
PMV BLD AUTO: 9.2 FL (ref 9–12.9)
POTASSIUM SERPL-SCNC: 4 MMOL/L (ref 3.6–5.5)
PROT SERPL-MCNC: 6.7 G/DL (ref 6–8.2)
PSA SERPL-MCNC: 0.24 NG/ML (ref 0–4)
RBC # BLD AUTO: 3.82 M/UL (ref 4.7–6.1)
SODIUM SERPL-SCNC: 136 MMOL/L (ref 135–145)
T4 FREE SERPL-MCNC: 1.31 NG/DL (ref 0.93–1.7)
TSH SERPL DL<=0.005 MIU/L-ACNC: 1.87 UIU/ML (ref 0.38–5.33)
WBC # BLD AUTO: 5.9 K/UL (ref 4.8–10.8)

## 2021-06-23 PROCEDURE — 85025 COMPLETE CBC W/AUTO DIFF WBC: CPT

## 2021-06-23 PROCEDURE — 84153 ASSAY OF PSA TOTAL: CPT

## 2021-06-23 PROCEDURE — 84439 ASSAY OF FREE THYROXINE: CPT

## 2021-06-23 PROCEDURE — 83036 HEMOGLOBIN GLYCOSYLATED A1C: CPT

## 2021-06-23 PROCEDURE — 36415 COLL VENOUS BLD VENIPUNCTURE: CPT

## 2021-06-23 PROCEDURE — 80053 COMPREHEN METABOLIC PANEL: CPT

## 2021-06-23 PROCEDURE — 84443 ASSAY THYROID STIM HORMONE: CPT

## 2021-06-23 NOTE — TELEPHONE ENCOUNTER
----- Message from Lilia Diego M.D. sent at 6/23/2021 11:24 AM PDT -----  Please have patient make an appointment to discuss his results.  Lilia Diego M.D.

## 2021-07-01 ENCOUNTER — OFFICE VISIT (OUTPATIENT)
Dept: MEDICAL GROUP | Facility: LAB | Age: 76
End: 2021-07-01
Payer: MEDICARE

## 2021-07-01 ENCOUNTER — HOSPITAL ENCOUNTER (OUTPATIENT)
Dept: LAB | Facility: MEDICAL CENTER | Age: 76
End: 2021-07-01
Attending: FAMILY MEDICINE
Payer: MEDICARE

## 2021-07-01 VITALS
DIASTOLIC BLOOD PRESSURE: 50 MMHG | HEIGHT: 67 IN | BODY MASS INDEX: 42.69 KG/M2 | OXYGEN SATURATION: 97 % | RESPIRATION RATE: 16 BRPM | TEMPERATURE: 97 F | WEIGHT: 272 LBS | HEART RATE: 69 BPM | SYSTOLIC BLOOD PRESSURE: 114 MMHG

## 2021-07-01 DIAGNOSIS — D53.9 MACROCYTIC ANEMIA: ICD-10-CM

## 2021-07-01 DIAGNOSIS — R53.83 FATIGUE, UNSPECIFIED TYPE: ICD-10-CM

## 2021-07-01 DIAGNOSIS — E11.65 TYPE 2 DIABETES MELLITUS WITH HYPERGLYCEMIA, WITHOUT LONG-TERM CURRENT USE OF INSULIN (HCC): ICD-10-CM

## 2021-07-01 DIAGNOSIS — E78.2 MIXED HYPERLIPIDEMIA: ICD-10-CM

## 2021-07-01 DIAGNOSIS — E03.9 ACQUIRED HYPOTHYROIDISM: ICD-10-CM

## 2021-07-01 LAB
FOLATE SERPL-MCNC: >40 NG/ML
SARS-COV-2 AB SERPL QL IA: NORMAL
VIT B12 SERPL-MCNC: 1069 PG/ML (ref 211–911)

## 2021-07-01 PROCEDURE — 82746 ASSAY OF FOLIC ACID SERUM: CPT

## 2021-07-01 PROCEDURE — 82607 VITAMIN B-12: CPT

## 2021-07-01 PROCEDURE — 36415 COLL VENOUS BLD VENIPUNCTURE: CPT

## 2021-07-01 PROCEDURE — 86769 SARS-COV-2 COVID-19 ANTIBODY: CPT

## 2021-07-01 PROCEDURE — 99214 OFFICE O/P EST MOD 30 MIN: CPT | Performed by: FAMILY MEDICINE

## 2021-07-01 ASSESSMENT — FIBROSIS 4 INDEX: FIB4 SCORE: 1.44

## 2021-07-01 NOTE — ASSESSMENT & PLAN NOTE
Stable. Currently taking Metformin 1000 mg BID  as directed.  Denies side effects or hypoglycemic events.  He is not monitoring blood sugar regularly at home.  Reports diet is better - he has lost 15 lbs  He is not exercising regularly.  Last eye exam: in the last year  Denies polyuria, polydipsia, blurred vision, or neuropathies.

## 2021-07-01 NOTE — ASSESSMENT & PLAN NOTE
Dyslipidemia Chronic condition. Current treatments: diet/exercise/medicines. He is taking rosuvastatin 20 mg and Zetia 10 mg as directed. Current side effects: none.
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
## 2021-07-01 NOTE — PATIENT INSTRUCTIONS
Vitamin B12 Deficiency  Vitamin B12 deficiency occurs when the body does not have enough vitamin B12, which is an important vitamin. The body needs this vitamin:  · To make red blood cells.  · To make DNA. This is the genetic material inside cells.  · To help the nerves work properly so they can carry messages from the brain to the body.  Vitamin B12 deficiency can cause various health problems, such as a low red blood cell count (anemia) or nerve damage.  What are the causes?  This condition may be caused by:  · Not eating enough foods that contain vitamin B12.  · Not having enough stomach acid and digestive fluids to properly absorb vitamin B12 from the food that you eat.  · Certain digestive system diseases that make it hard to absorb vitamin B12. These diseases include Crohn's disease, chronic pancreatitis, and cystic fibrosis.  · A condition in which the body does not make enough of a protein (intrinsic factor), resulting in too few red blood cells (pernicious anemia).  · Having a surgery in which part of the stomach or small intestine is removed.  · Taking certain medicines that make it hard for the body to absorb vitamin B12. These medicines include:  ? Heartburn medicines (antacids and proton pump inhibitors).  ? Certain antibiotic medicines.  ? Some medicines that are used to treat diabetes, tuberculosis, gout, or high cholesterol.  What increases the risk?  The following factors may make you more likely to develop a B12 deficiency:  · Being older than age 50.  · Eating a vegetarian or vegan diet, especially while you are pregnant.  · Eating a poor diet while you are pregnant.  · Taking certain medicines.  · Having alcoholism.  What are the signs or symptoms?  In some cases, there are no symptoms of this condition. If the condition leads to anemia or nerve damage, various symptoms can occur, such as:  · Weakness.  · Fatigue.  · Loss of appetite.  · Weight loss.  · Numbness or tingling in your hands and  feet.  · Redness and burning of the tongue.  · Confusion or memory problems.  · Depression.  · Sensory problems, such as color blindness, ringing in the ears, or loss of taste.  · Diarrhea or constipation.  · Trouble walking.  If anemia is severe, symptoms can include:  · Shortness of breath.  · Dizziness.  · Rapid heart rate (tachycardia).  How is this diagnosed?  This condition may be diagnosed with a blood test to measure the level of vitamin B12 in your blood. You may also have other tests, including:  · A group of tests that measure certain characteristics of blood cells (complete blood count, CBC).  · A blood test to measure intrinsic factor.  · A procedure where a thin tube with a camera on the end is used to look into your stomach or intestines (endoscopy).  Other tests may be needed to discover the cause of B12 deficiency.  How is this treated?  Treatment for this condition depends on the cause. This condition may be treated by:  · Changing your eating and drinking habits, such as:  ? Eating more foods that contain vitamin B12.  ? Drinking less alcohol or no alcohol.  · Getting vitamin B12 injections.  · Taking vitamin B12 supplements. Your health care provider will tell you which dosage is best for you.  Follow these instructions at home:  Eating and drinking    · Eat lots of healthy foods that contain vitamin B12, including:  ? Meats and poultry. This includes beef, pork, chicken, turkey, and organ meats, such as liver.  ? Seafood. This includes clams, rainbow trout, salmon, tuna, and ruben.  ? Eggs.  ? Cereal and dairy products that are fortified. This means that vitamin B12 has been added to the food. Check the label on the package to see if the food is fortified.  The items listed above may not be a complete list of recommended foods and beverages. Contact a dietitian for more information.  General instructions  · Get any injections that are prescribed by your health care provider.  · Take  supplements only as told by your health care provider. Follow the directions carefully.  · Do not drink alcohol if your health care provider tells you not to. In some cases, you may only be asked to limit alcohol use.  · Keep all follow-up visits as told by your health care provider. This is important.  Contact a health care provider if:  · Your symptoms come back.  Get help right away if you:  · Develop shortness of breath.  · Have a rapid heart rate.  · Have chest pain.  · Become dizzy or lose consciousness.  Summary  · Vitamin B12 deficiency occurs when the body does not have enough vitamin B12.  · The main causes of vitamin B12 deficiency include dietary deficiency, digestive diseases, pernicious anemia, and having a surgery in which part of the stomach or small intestine is removed.  · In some cases, there are no symptoms of this condition. If the condition leads to anemia or nerve damage, various symptoms can occur, such as weakness, shortness of breath, and numbness.  · Treatment may include getting vitamin B12 injections or taking vitamin B12 supplements. Eat lots of healthy foods that contain vitamin B12.  This information is not intended to replace advice given to you by your health care provider. Make sure you discuss any questions you have with your health care provider.  Document Released: 03/11/2013 Document Revised: 08/27/2019 Document Reviewed: 08/27/2019  Elsevier Patient Education © 2020 [employer]

## 2021-07-01 NOTE — ASSESSMENT & PLAN NOTE
Results for PAUL MOTLEY JR. (MRN [id number]) as of 7/1/2021 15:19   Ref. 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  This seems to have resolved 3 months ago.  He did not have any URI symptoms when this initially started.  He has not been immunized against Covid.

## 2021-07-09 DIAGNOSIS — I10 ESSENTIAL HYPERTENSION: ICD-10-CM

## 2021-07-09 DIAGNOSIS — E11.65 TYPE 2 DIABETES MELLITUS WITH HYPERGLYCEMIA, WITHOUT LONG-TERM CURRENT USE OF INSULIN (HCC): ICD-10-CM

## 2021-07-09 RX ORDER — LOSARTAN POTASSIUM 50 MG/1
50 TABLET ORAL
Qty: 100 TABLET | Refills: 3 | Status: SHIPPED | OUTPATIENT
Start: 2021-07-09 | End: 2021-11-30

## 2021-07-09 RX ORDER — TRIAMTERENE AND HYDROCHLOROTHIAZIDE 75; 50 MG/1; MG/1
1 TABLET ORAL
Qty: 100 TABLET | Refills: 3 | OUTPATIENT
Start: 2021-07-09

## 2021-07-11 DIAGNOSIS — I10 ESSENTIAL HYPERTENSION: ICD-10-CM

## 2021-07-13 RX ORDER — TRIAMTERENE AND HYDROCHLOROTHIAZIDE 75; 50 MG/1; MG/1
1 TABLET ORAL
Qty: 90 TABLET | Refills: 3 | Status: SHIPPED | OUTPATIENT
Start: 2021-07-13 | End: 2021-10-07

## 2021-07-29 RX ORDER — NIFEDIPINE 90 MG/1
TABLET, FILM COATED, EXTENDED RELEASE ORAL
Qty: 100 TABLET | Refills: 2 | Status: SHIPPED | OUTPATIENT
Start: 2021-07-29 | End: 2021-11-30

## 2021-07-29 NOTE — TELEPHONE ENCOUNTER
Received request via: Patient    Was the patient seen in the last year in this department? Yes  LOV 07/01/2021  Does the patient have an active prescription (recently filled or refills available) for medication(s) requested? No     Original Rx sent to [facility]. Can this be re-routed?

## 2021-08-17 PROCEDURE — 99282 EMERGENCY DEPT VISIT SF MDM: CPT

## 2021-08-17 ASSESSMENT — FIBROSIS 4 INDEX: FIB4 SCORE: 1.44

## 2021-08-18 ENCOUNTER — HOSPITAL ENCOUNTER (EMERGENCY)
Facility: MEDICAL CENTER | Age: 76
End: 2021-08-18
Attending: EMERGENCY MEDICINE
Payer: MEDICARE

## 2021-08-18 VITALS
SYSTOLIC BLOOD PRESSURE: 148 MMHG | WEIGHT: 277.34 LBS | DIASTOLIC BLOOD PRESSURE: 66 MMHG | BODY MASS INDEX: 41.08 KG/M2 | HEART RATE: 61 BPM | OXYGEN SATURATION: 98 % | RESPIRATION RATE: 20 BRPM | HEIGHT: 69 IN | TEMPERATURE: 97.9 F

## 2021-08-18 DIAGNOSIS — T23.242A PARTIAL THICKNESS BURN OF MULTIPLE DIGITS OF LEFT HAND INCLUDING PARTIAL THICKNESS BURN OF THUMB, INITIAL ENCOUNTER: ICD-10-CM

## 2021-08-18 PROCEDURE — 700101 HCHG RX REV CODE 250: Performed by: EMERGENCY MEDICINE

## 2021-08-18 RX ORDER — BACITRACIN ZINC AND POLYMYXIN B SULFATE 500; 1000 [USP'U]/G; [USP'U]/G
1 OINTMENT TOPICAL 2 TIMES DAILY
Qty: 30 G | Refills: 0 | Status: SHIPPED | OUTPATIENT
Start: 2021-08-18 | End: 2021-10-14

## 2021-08-18 RX ORDER — BACITRACIN ZINC AND POLYMYXIN B SULFATE 500; 1000 [USP'U]/G; [USP'U]/G
OINTMENT TOPICAL ONCE
Status: COMPLETED | OUTPATIENT
Start: 2021-08-18 | End: 2021-08-18

## 2021-08-18 RX ADMIN — FIRST AID ANTIBIOTIC OINTMENT: 500; 10000 OINTMENT TOPICAL at 02:17

## 2021-08-18 ASSESSMENT — PAIN DESCRIPTION - PAIN TYPE: TYPE: ACUTE PAIN
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
## 2021-08-18 NOTE — ED NOTES
Discharge instructions reviewed with patient. AVS signed by patient. No PIV present. Prescription electronically sent to pharmacy of choice. Patient understands to follow-up with healthcare team and to return to ED for worsening symptoms. All questions answered at this time. Patient ambulated to exit with belongings. Patient in stable condition with no signs of distress. Patient agreeable to discharge instructions.
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
## 2021-08-20 ENCOUNTER — PATIENT MESSAGE (OUTPATIENT)
Dept: HEALTH INFORMATION MANAGEMENT | Facility: OTHER | Age: 76
End: 2021-08-20

## 2021-08-25 DIAGNOSIS — I10 ESSENTIAL HYPERTENSION: ICD-10-CM

## 2021-08-25 RX ORDER — METOPROLOL SUCCINATE 25 MG/1
25 TABLET, EXTENDED RELEASE ORAL
Qty: 90 TABLET | Refills: 2 | Status: SHIPPED | OUTPATIENT
Start: 2021-08-25 | End: 2022-06-06

## 2021-08-28 DIAGNOSIS — E78.5 DYSLIPIDEMIA: ICD-10-CM

## 2021-08-30 DIAGNOSIS — F32.1 CURRENT MODERATE EPISODE OF MAJOR DEPRESSIVE DISORDER WITHOUT PRIOR EPISODE (HCC): ICD-10-CM

## 2021-08-30 RX ORDER — ROSUVASTATIN CALCIUM 20 MG/1
20 TABLET, COATED ORAL EVERY EVENING
Qty: 100 TABLET | Refills: 0 | Status: SHIPPED | OUTPATIENT
Start: 2021-08-30 | End: 2021-12-14

## 2021-08-30 RX ORDER — ESCITALOPRAM OXALATE 20 MG/1
20 TABLET ORAL
Qty: 90 TABLET | Refills: 3 | Status: SHIPPED | OUTPATIENT
Start: 2021-08-30 | End: 2022-07-05 | Stop reason: SDUPTHER

## 2021-08-30 NOTE — TELEPHONE ENCOUNTER
----- Message from Milton Javed Jr. sent at 8/28/2021 10:23 PM PDT -----  Regarding: refill request  hello hope all is well!  need refill of:  ESCITALOPRAM OXALATE 20MG  TABS  [facility]  [id number] [place]. [address]    Thank You!!

## 2021-08-30 NOTE — TELEPHONE ENCOUNTER
Received request via: Patient    Was the patient seen in the last year in this department? Yes  7/1/21  Does the patient have an active prescription (recently filled or refills available) for medication(s) requested? No

## 2021-09-23 ENCOUNTER — PATIENT MESSAGE (OUTPATIENT)
Dept: MEDICAL GROUP | Facility: LAB | Age: 76
End: 2021-09-23

## 2021-09-23 DIAGNOSIS — E11.65 TYPE 2 DIABETES MELLITUS WITH HYPERGLYCEMIA, WITHOUT LONG-TERM CURRENT USE OF INSULIN (HCC): ICD-10-CM

## 2021-09-24 NOTE — TELEPHONE ENCOUNTER
From: Milton Javed Jr.  To: Physician Lilia Diego  Sent: 9/23/2021 3:05 PM PDT  Subject: Prescription Refill Request    [facility] Dr. Diego  Hope All Is Well With You & Your team!  Prescription Refill Request:  METFORMIN HYDROCHLIORIDE ER 500mgTABLETS   2 TABLETS Every 12 Hours (After Breakfast/Dinner)  3 Month Refill.    Thank You!!  Have A Wonderful Week !!  William Javed

## 2021-09-27 RX ORDER — METFORMIN HYDROCHLORIDE 500 MG/1
1000 TABLET, EXTENDED RELEASE ORAL 2 TIMES DAILY
Qty: 360 TABLET | Refills: 1 | Status: SHIPPED | OUTPATIENT
Start: 2021-09-27 | End: 2021-09-29 | Stop reason: SDUPTHER

## 2021-09-29 DIAGNOSIS — E11.65 TYPE 2 DIABETES MELLITUS WITH HYPERGLYCEMIA, WITHOUT LONG-TERM CURRENT USE OF INSULIN (HCC): ICD-10-CM

## 2021-09-29 RX ORDER — METFORMIN HYDROCHLORIDE 500 MG/1
1000 TABLET, EXTENDED RELEASE ORAL 2 TIMES DAILY
Qty: 400 TABLET | Refills: 1 | Status: SHIPPED | OUTPATIENT
Start: 2021-09-29 | End: 2021-11-30 | Stop reason: SDUPTHER

## 2021-09-30 ENCOUNTER — TELEPHONE (OUTPATIENT)
Dept: MEDICAL GROUP | Facility: LAB | Age: 76
End: 2021-09-30

## 2021-09-30 NOTE — TELEPHONE ENCOUNTER
ESTABLISHED PATIENT PRE-VISIT PLANNING     Patient was NOT contacted to complete PVP.     Note: Patient will not be contacted if there is no indication to call.     1.  Reviewed notes from the last few office visits within the medical group: Yes    2.  If any orders were placed at last visit or intended to be done for this visit (i.e. 6 mos follow-up), do we have Results/Consult Notes?         •  Labs - Labs ordered, completed on 7/1/2021 and results are in chart.  Note: If patient appointment is for lab review and patient did not complete labs, check with provider if OK to reschedule patient until labs completed.       •  Imaging - Imaging was not ordered at last office visit.       •  Referrals - No referrals were ordered at last office visit.    3. Is this appointment scheduled as a Hospital Follow-Up? Yes, visit was at [facility].     4.  Immunizations were updated in Epic using Reconcile Outside Information activity? Yes    5.  Patient is due for the following Health Maintenance Topics:   Health Maintenance Due   Topic Date Due   • COVID-19 Vaccine (1) Never done   • IMM ZOSTER VACCINES (1 of 2) Never done   • IMM INFLUENZA (1) 09/01/2021         6.  AHA (Pulse8) form printed for Provider? No, patient does not have any open alerts

## 2021-09-30 NOTE — TELEPHONE ENCOUNTER
Colorectal Care Gap Outreach    1. Confirmed patient is between the ages of 50-75: YES     2. Confirmed that patient IS overdue or due soon for colorectal cancer screening: YES     3. Were orders placed within the last 12 months to complete screening: YES     Phone Number Called: [phone]  Call outcome: Patient is NOT interested in completing any colorectal cancer screenings.    I spoke w/pt and he is not interested in further colonoscopy. I called [facility] and they said his last colonoscopy was 5/28/2009 and will fax report to us to update his records.     _____________________________________________________________________    Colon Cancer Screening Guidelines:     Important: If patient has any history of colon polyps or family history of colorectal cancer, FIT and Cologuard are NOT appropriate options. A colonoscopy is the recommended test for this set of patients.    • Colonoscopy  o Always recommend colonoscopy first.   o A colonoscopy is recommended over the other tests because it provides direct visualization of the colon and if there are small polyps these can also be removed with one procedure.  o If negative and no family history, could be cleared for 10 years.     • Cologuard/FIT  o Cologuard is completed once every 3 years.  o FIT is completed annually.  o If positive, Cologuard and FIT will require a diagnostic colonoscopy. Screening colonoscopies are classically covered by insurances, however, diagnostic colonoscopies may result in a bill.

## 2021-10-01 ENCOUNTER — PATIENT MESSAGE (OUTPATIENT)
Dept: HEALTH INFORMATION MANAGEMENT | Facility: OTHER | Age: 76
End: 2021-10-01

## 2021-10-07 ENCOUNTER — OFFICE VISIT (OUTPATIENT)
Dept: MEDICAL GROUP | Facility: LAB | Age: 76
End: 2021-10-07
Payer: MEDICARE

## 2021-10-07 VITALS
HEART RATE: 61 BPM | SYSTOLIC BLOOD PRESSURE: 118 MMHG | HEIGHT: 67 IN | DIASTOLIC BLOOD PRESSURE: 52 MMHG | BODY MASS INDEX: 42.69 KG/M2 | OXYGEN SATURATION: 96 % | TEMPERATURE: 97.6 F | RESPIRATION RATE: 16 BRPM | WEIGHT: 272 LBS

## 2021-10-07 DIAGNOSIS — Z85.46 PERSONAL HISTORY OF PROSTATE CANCER: ICD-10-CM

## 2021-10-07 DIAGNOSIS — N39.41 URGE INCONTINENCE OF URINE: ICD-10-CM

## 2021-10-07 DIAGNOSIS — E11.65 TYPE 2 DIABETES MELLITUS WITH HYPERGLYCEMIA, WITHOUT LONG-TERM CURRENT USE OF INSULIN (HCC): ICD-10-CM

## 2021-10-07 DIAGNOSIS — Z23 NEED FOR VACCINATION: ICD-10-CM

## 2021-10-07 DIAGNOSIS — I10 ESSENTIAL HYPERTENSION: ICD-10-CM

## 2021-10-07 DIAGNOSIS — E66.01 CLASS 3 SEVERE OBESITY WITH SERIOUS COMORBIDITY AND BODY MASS INDEX (BMI) OF 40.0 TO 44.9 IN ADULT, UNSPECIFIED OBESITY TYPE (HCC): ICD-10-CM

## 2021-10-07 PROBLEM — E66.813 CLASS 3 SEVERE OBESITY WITHOUT SERIOUS COMORBIDITY WITH BODY MASS INDEX (BMI) OF 40.0 TO 44.9 IN ADULT (HCC): Status: RESOLVED | Noted: 2017-04-13 | Resolved: 2021-10-07

## 2021-10-07 PROBLEM — E66.813 CLASS 3 SEVERE OBESITY WITH SERIOUS COMORBIDITY AND BODY MASS INDEX (BMI) OF 40.0 TO 44.9 IN ADULT (HCC): Status: ACTIVE | Noted: 2020-08-03

## 2021-10-07 LAB
APPEARANCE UR: CLEAR
BILIRUB UR STRIP-MCNC: NEGATIVE MG/DL
COLOR UR AUTO: YELLOW
GLUCOSE UR STRIP.AUTO-MCNC: NEGATIVE MG/DL
HBA1C MFR BLD: 6.2 % (ref 0–5.6)
INT CON NEG: ABNORMAL
INT CON POS: ABNORMAL
KETONES UR STRIP.AUTO-MCNC: NEGATIVE MG/DL
LEUKOCYTE ESTERASE UR QL STRIP.AUTO: NEGATIVE
NITRITE UR QL STRIP.AUTO: NEGATIVE
PH UR STRIP.AUTO: 5.5 [PH] (ref 5–8)
PROT UR QL STRIP: NEGATIVE MG/DL
RBC UR QL AUTO: NEGATIVE
SP GR UR STRIP.AUTO: 1.02
UROBILINOGEN UR STRIP-MCNC: 0.2 MG/DL

## 2021-10-07 PROCEDURE — G0008 ADMIN INFLUENZA VIRUS VAC: HCPCS | Performed by: FAMILY MEDICINE

## 2021-10-07 PROCEDURE — 83036 HEMOGLOBIN GLYCOSYLATED A1C: CPT | Performed by: FAMILY MEDICINE

## 2021-10-07 PROCEDURE — 81002 URINALYSIS NONAUTO W/O SCOPE: CPT | Performed by: FAMILY MEDICINE

## 2021-10-07 PROCEDURE — 90662 IIV NO PRSV INCREASED AG IM: CPT | Performed by: FAMILY MEDICINE

## 2021-10-07 PROCEDURE — 99214 OFFICE O/P EST MOD 30 MIN: CPT | Mod: 25 | Performed by: FAMILY MEDICINE

## 2021-10-07 RX ORDER — TRIAMTERENE AND HYDROCHLOROTHIAZIDE 75; 50 MG/1; MG/1
0.5 TABLET ORAL
Qty: 90 TABLET | Refills: 3 | COMMUNITY
Start: 2021-10-07 | End: 2021-11-30

## 2021-10-07 ASSESSMENT — FIBROSIS 4 INDEX: FIB4 SCORE: 1.44
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
## 2021-10-07 NOTE — ASSESSMENT & PLAN NOTE
Currently taking Metformin 100 mg BID as directed.  Denies side effects or hypoglycemic events.   He is not monitoring blood sugar regularly at home.  Reports diet is not great  He is not exercising regularly.  Denies polyuria, polydipsia, blurred vision, or neuropathies.
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
## 2021-10-29 ENCOUNTER — PATIENT MESSAGE (OUTPATIENT)
Dept: MEDICAL GROUP | Facility: LAB | Age: 76
End: 2021-10-29

## 2021-11-03 ENCOUNTER — DOCUMENTATION (OUTPATIENT)
Dept: VASCULAR LAB | Facility: MEDICAL CENTER | Age: 76
End: 2021-11-03

## 2021-11-03 NOTE — PROGRESS NOTES
Patient had been lost to follow up.  LM for patient to call back to reestablish in lipid clinic.  Justin Back, PharmD, BCACP

## 2021-11-22 ENCOUNTER — TELEPHONE (OUTPATIENT)
Dept: MEDICAL GROUP | Facility: LAB | Age: 76
End: 2021-11-22

## 2021-11-22 NOTE — TELEPHONE ENCOUNTER
Patient requesting to come in and see Dr. Diego regarding dizziness for the past few months. He is able to walk/see straight without assistance. Has been sending [employer] messages to PCP regarding this who requests he make an appt in office. Patient declines seeing any other provider in the office, only would like to continue with Dr. Diego.     Scheduled soonest available with Dr. Diego in 1 week.     Patient agrees to be seen urgently if symptoms worsen in any way. Reviewed UC/ER precautions and patient voiced understanding and agreement.     JUDY Louis

## 2021-11-23 ENCOUNTER — TELEPHONE (OUTPATIENT)
Dept: MEDICAL GROUP | Facility: LAB | Age: 76
End: 2021-11-23

## 2021-11-23 NOTE — TELEPHONE ENCOUNTER
ESTABLISHED PATIENT PRE-VISIT PLANNING     Patient was NOT contacted to complete PVP.     Note: Patient will not be contacted if there is no indication to call.     1.  Reviewed notes from the last few office visits within the medical group: Yes    2.  If any orders were placed at last visit or intended to be done for this visit (i.e. 6 mos follow-up), do we have Results/Consult Notes?         •  Labs - Labs ordered, completed on 10/7/2021 and results are in chart.  Note: If patient appointment is for lab review and patient did not complete labs, check with provider if OK to reschedule patient until labs completed.       •  Imaging - Imaging was not ordered at last office visit.       •  Referrals - Referral ordered, patient was seen and consult notes are in chart. Care Teams updated  NO.    3. Is this appointment scheduled as a Hospital Follow-Up? No    4.  Immunizations were updated in Epic using Reconcile Outside Information activity? Yes    5.  Patient is due for the following Health Maintenance Topics:   Health Maintenance Due   Topic Date Due   • COVID-19 Vaccine (1) Never done   • IMM ZOSTER VACCINES (1 of 2) Never done         6.  AHA (Pulse8) form printed for Provider? No, patient does not have any open alerts        
clothing

## 2021-11-30 ENCOUNTER — OFFICE VISIT (OUTPATIENT)
Dept: MEDICAL GROUP | Facility: LAB | Age: 76
End: 2021-11-30
Payer: MEDICARE

## 2021-11-30 ENCOUNTER — HOSPITAL ENCOUNTER (OUTPATIENT)
Dept: LAB | Facility: MEDICAL CENTER | Age: 76
End: 2021-11-30
Attending: FAMILY MEDICINE
Payer: MEDICARE

## 2021-11-30 ENCOUNTER — HOSPITAL ENCOUNTER (OUTPATIENT)
Dept: LAB | Facility: MEDICAL CENTER | Age: 76
End: 2021-11-30
Attending: PHYSICIAN ASSISTANT
Payer: MEDICARE

## 2021-11-30 VITALS
SYSTOLIC BLOOD PRESSURE: 110 MMHG | WEIGHT: 277 LBS | HEART RATE: 76 BPM | DIASTOLIC BLOOD PRESSURE: 60 MMHG | HEIGHT: 67 IN | RESPIRATION RATE: 12 BRPM | OXYGEN SATURATION: 95 % | BODY MASS INDEX: 43.47 KG/M2 | TEMPERATURE: 97.1 F

## 2021-11-30 DIAGNOSIS — Z85.46 PERSONAL HISTORY OF PROSTATE CANCER: ICD-10-CM

## 2021-11-30 DIAGNOSIS — D50.9 MICROCYTIC ANEMIA: ICD-10-CM

## 2021-11-30 DIAGNOSIS — I10 ESSENTIAL HYPERTENSION: ICD-10-CM

## 2021-11-30 DIAGNOSIS — E11.65 TYPE 2 DIABETES MELLITUS WITH HYPERGLYCEMIA, WITHOUT LONG-TERM CURRENT USE OF INSULIN (HCC): ICD-10-CM

## 2021-11-30 DIAGNOSIS — R42 DIZZINESS: ICD-10-CM

## 2021-11-30 LAB
ALBUMIN SERPL BCP-MCNC: 4.4 G/DL (ref 3.2–4.9)
ALBUMIN/GLOB SERPL: 1.8 G/DL
ALP SERPL-CCNC: 57 U/L (ref 30–99)
ALT SERPL-CCNC: 28 U/L (ref 2–50)
ANION GAP SERPL CALC-SCNC: 12 MMOL/L (ref 7–16)
AST SERPL-CCNC: 20 U/L (ref 12–45)
BASOPHILS # BLD AUTO: 0.7 % (ref 0–1.8)
BASOPHILS # BLD: 0.04 K/UL (ref 0–0.12)
BILIRUB SERPL-MCNC: 0.4 MG/DL (ref 0.1–1.5)
BUN SERPL-MCNC: 20 MG/DL (ref 8–22)
CALCIUM SERPL-MCNC: 9.3 MG/DL (ref 8.5–10.5)
CHLORIDE SERPL-SCNC: 105 MMOL/L (ref 96–112)
CO2 SERPL-SCNC: 21 MMOL/L (ref 20–33)
CREAT SERPL-MCNC: 1.04 MG/DL (ref 0.5–1.4)
EOSINOPHIL # BLD AUTO: 0.31 K/UL (ref 0–0.51)
EOSINOPHIL NFR BLD: 5.5 % (ref 0–6.9)
ERYTHROCYTE [DISTWIDTH] IN BLOOD BY AUTOMATED COUNT: 42 FL (ref 35.9–50)
FERRITIN SERPL-MCNC: 385 NG/ML (ref 22–322)
GLOBULIN SER CALC-MCNC: 2.4 G/DL (ref 1.9–3.5)
GLUCOSE SERPL-MCNC: 181 MG/DL (ref 65–99)
HCT VFR BLD AUTO: 37.1 % (ref 42–52)
HGB BLD-MCNC: 13.1 G/DL (ref 14–18)
IMM GRANULOCYTES # BLD AUTO: 0.01 K/UL (ref 0–0.11)
IMM GRANULOCYTES NFR BLD AUTO: 0.2 % (ref 0–0.9)
IRON SATN MFR SERPL: 32 % (ref 15–55)
IRON SERPL-MCNC: 82 UG/DL (ref 50–180)
LYMPHOCYTES # BLD AUTO: 1.23 K/UL (ref 1–4.8)
LYMPHOCYTES NFR BLD: 21.7 % (ref 22–41)
MCH RBC QN AUTO: 33.2 PG (ref 27–33)
MCHC RBC AUTO-ENTMCNC: 35.3 G/DL (ref 33.7–35.3)
MCV RBC AUTO: 94.2 FL (ref 81.4–97.8)
MONOCYTES # BLD AUTO: 0.43 K/UL (ref 0–0.85)
MONOCYTES NFR BLD AUTO: 7.6 % (ref 0–13.4)
NEUTROPHILS # BLD AUTO: 3.65 K/UL (ref 1.82–7.42)
NEUTROPHILS NFR BLD: 64.3 % (ref 44–72)
NRBC # BLD AUTO: 0 K/UL
NRBC BLD-RTO: 0 /100 WBC
PLATELET # BLD AUTO: 202 K/UL (ref 164–446)
PMV BLD AUTO: 9.7 FL (ref 9–12.9)
POTASSIUM SERPL-SCNC: 4.1 MMOL/L (ref 3.6–5.5)
PROT SERPL-MCNC: 6.8 G/DL (ref 6–8.2)
PSA SERPL-MCNC: 0.23 NG/ML (ref 0–4)
RBC # BLD AUTO: 3.94 M/UL (ref 4.7–6.1)
SODIUM SERPL-SCNC: 138 MMOL/L (ref 135–145)
TIBC SERPL-MCNC: 260 UG/DL (ref 250–450)
UIBC SERPL-MCNC: 178 UG/DL (ref 110–370)
WBC # BLD AUTO: 5.7 K/UL (ref 4.8–10.8)

## 2021-11-30 PROCEDURE — 36415 COLL VENOUS BLD VENIPUNCTURE: CPT

## 2021-11-30 PROCEDURE — 99214 OFFICE O/P EST MOD 30 MIN: CPT | Performed by: FAMILY MEDICINE

## 2021-11-30 PROCEDURE — 80053 COMPREHEN METABOLIC PANEL: CPT

## 2021-11-30 PROCEDURE — 85025 COMPLETE CBC W/AUTO DIFF WBC: CPT

## 2021-11-30 PROCEDURE — 83540 ASSAY OF IRON: CPT

## 2021-11-30 PROCEDURE — 84153 ASSAY OF PSA TOTAL: CPT

## 2021-11-30 PROCEDURE — 83550 IRON BINDING TEST: CPT

## 2021-11-30 PROCEDURE — 82728 ASSAY OF FERRITIN: CPT

## 2021-11-30 RX ORDER — LANCETS 30 GAUGE
EACH MISCELLANEOUS
Qty: 100 EACH | Refills: 3 | Status: SHIPPED | OUTPATIENT
Start: 2021-11-30

## 2021-11-30 RX ORDER — METFORMIN HYDROCHLORIDE 500 MG/1
500 TABLET, EXTENDED RELEASE ORAL 2 TIMES DAILY
Qty: 180 TABLET | Refills: 0 | COMMUNITY
Start: 2021-11-30 | End: 2022-07-05 | Stop reason: SDUPTHER

## 2021-11-30 ASSESSMENT — FIBROSIS 4 INDEX: FIB4 SCORE: 1.44
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
## 2021-12-13 NOTE — ASSESSMENT & PLAN NOTE
Patient has been taking Metformin 1000 mg twice a day.  He has not been checking his blood sugars as he has an old meter.  He is willing to check them if we get him a new meter.  His last hemoglobin A1c was 6.2.  I believe that this is to aggressively managed and we may need to back off on his medication because he may be having episodes of hypoglycemia.  Decrease Metformin to 500 mg twice a day.

## 2021-12-21 ENCOUNTER — TELEPHONE (OUTPATIENT)
Dept: MEDICAL GROUP | Facility: LAB | Age: 76
End: 2021-12-21

## 2021-12-21 NOTE — TELEPHONE ENCOUNTER
ESTABLISHED PATIENT PRE-VISIT PLANNING     Patient was NOT contacted to complete PVP.     Note: Patient will not be contacted if there is no indication to call.     1.  Reviewed notes from the last few office visits within the medical group: Yes    2.  If any orders were placed at last visit or intended to be done for this visit (i.e. 6 mos follow-up), do we have Results/Consult Notes?         •  Labs - Labs ordered, but not to be completed until unknown .  Note: If patient appointment is for lab review and patient did not complete labs, check with provider if OK to reschedule patient until labs completed.       •  Imaging - Imaging was not ordered at last office visit.       •  Referrals - No referrals were ordered at last office visit.    3. Is this appointment scheduled as a Hospital Follow-Up? No    4.  Immunizations were updated in Epic using Reconcile Outside Information activity? Yes    5.  Patient is due for the following Health Maintenance Topics:   Health Maintenance Due   Topic Date Due   • COVID-19 Vaccine (1) Never done   • IMM ZOSTER VACCINES (1 of 2) Never done         6.  AHA (Pulse8) form printed for Provider? Yes

## 2022-01-28 ENCOUNTER — PATIENT MESSAGE (OUTPATIENT)
Dept: HEALTH INFORMATION MANAGEMENT | Facility: OTHER | Age: 77
End: 2022-01-28
Payer: MEDICARE

## 2022-02-25 DIAGNOSIS — E78.5 DYSLIPIDEMIA: ICD-10-CM

## 2022-02-25 RX ORDER — EZETIMIBE 10 MG/1
10 TABLET ORAL DAILY
Qty: 90 TABLET | Refills: 1 | OUTPATIENT
Start: 2022-02-25

## 2022-02-28 DIAGNOSIS — E78.5 DYSLIPIDEMIA: ICD-10-CM

## 2022-02-28 RX ORDER — EZETIMIBE 10 MG/1
10 TABLET ORAL DAILY
Qty: 90 TABLET | Refills: 1 | Status: SHIPPED | OUTPATIENT
Start: 2022-02-28 | End: 2022-07-05 | Stop reason: SDUPTHER

## 2022-02-28 NOTE — TELEPHONE ENCOUNTER
----- Message from Milton Javed Jr. sent at 2/28/2022  1:54 PM PST -----  Regarding: Refill Request  I Need a refill of Ezetimibe 10mg Tablets 90 Count .  Please Send Jay  Ok To Refill. And Please do 90 Count (My Ins Prefers this)  Jay [id number] [facility] [place] [address]  Thank You!  Milton Javed

## 2022-04-22 ENCOUNTER — TELEPHONE (OUTPATIENT)
Dept: HEALTH INFORMATION MANAGEMENT | Facility: OTHER | Age: 77
End: 2022-04-22

## 2022-05-31 DIAGNOSIS — E03.9 ACQUIRED HYPOTHYROIDISM: ICD-10-CM

## 2022-05-31 RX ORDER — LEVOTHYROXINE SODIUM 0.07 MG/1
75 TABLET ORAL
Qty: 90 TABLET | Refills: 0 | Status: SHIPPED | OUTPATIENT
Start: 2022-05-31 | End: 2022-07-05 | Stop reason: SDUPTHER

## 2022-05-31 NOTE — TELEPHONE ENCOUNTER
Received request via: Patient    Was the patient seen in the last year in this department? Yes  LOV 11/30/2021  Does the patient have an active prescription (recently filled or refills available) for medication(s) requested? No

## 2022-06-04 DIAGNOSIS — I10 ESSENTIAL HYPERTENSION: ICD-10-CM

## 2022-06-07 RX ORDER — METOPROLOL SUCCINATE 25 MG/1
25 TABLET, EXTENDED RELEASE ORAL
Qty: 100 TABLET | Refills: 0 | Status: SHIPPED | OUTPATIENT
Start: 2022-06-07 | End: 2022-07-05 | Stop reason: SDUPTHER

## 2022-06-28 DIAGNOSIS — E11.65 TYPE 2 DIABETES MELLITUS WITH HYPERGLYCEMIA, WITHOUT LONG-TERM CURRENT USE OF INSULIN (HCC): ICD-10-CM

## 2022-06-28 DIAGNOSIS — E03.9 ACQUIRED HYPOTHYROIDISM: ICD-10-CM

## 2022-06-28 DIAGNOSIS — E78.2 MIXED HYPERLIPIDEMIA: ICD-10-CM

## 2022-06-30 ENCOUNTER — HOSPITAL ENCOUNTER (OUTPATIENT)
Dept: LAB | Facility: MEDICAL CENTER | Age: 77
End: 2022-06-30
Attending: INTERNAL MEDICINE
Payer: MEDICARE

## 2022-06-30 DIAGNOSIS — E11.65 TYPE 2 DIABETES MELLITUS WITH HYPERGLYCEMIA, WITHOUT LONG-TERM CURRENT USE OF INSULIN (HCC): ICD-10-CM

## 2022-06-30 DIAGNOSIS — E03.9 ACQUIRED HYPOTHYROIDISM: ICD-10-CM

## 2022-06-30 DIAGNOSIS — E78.2 MIXED HYPERLIPIDEMIA: ICD-10-CM

## 2022-06-30 LAB
25(OH)D3 SERPL-MCNC: 78 NG/ML (ref 30–100)
ALBUMIN SERPL BCP-MCNC: 4.4 G/DL (ref 3.2–4.9)
ALBUMIN/GLOB SERPL: 2.1 G/DL
ALP SERPL-CCNC: 64 U/L (ref 30–99)
ALT SERPL-CCNC: 32 U/L (ref 2–50)
ANION GAP SERPL CALC-SCNC: 13 MMOL/L (ref 7–16)
AST SERPL-CCNC: 22 U/L (ref 12–45)
BASOPHILS # BLD AUTO: 1.1 % (ref 0–1.8)
BASOPHILS # BLD: 0.05 K/UL (ref 0–0.12)
BILIRUB SERPL-MCNC: 0.6 MG/DL (ref 0.1–1.5)
BUN SERPL-MCNC: 20 MG/DL (ref 8–22)
CALCIUM SERPL-MCNC: 9 MG/DL (ref 8.5–10.5)
CHLORIDE SERPL-SCNC: 109 MMOL/L (ref 96–112)
CHOLEST SERPL-MCNC: 115 MG/DL (ref 100–199)
CO2 SERPL-SCNC: 19 MMOL/L (ref 20–33)
CREAT SERPL-MCNC: 0.95 MG/DL (ref 0.5–1.4)
CREAT UR-MCNC: 94.84 MG/DL
EOSINOPHIL # BLD AUTO: 0.29 K/UL (ref 0–0.51)
EOSINOPHIL NFR BLD: 6.2 % (ref 0–6.9)
ERYTHROCYTE [DISTWIDTH] IN BLOOD BY AUTOMATED COUNT: 43.7 FL (ref 35.9–50)
EST. AVERAGE GLUCOSE BLD GHB EST-MCNC: 206 MG/DL
GFR SERPLBLD CREATININE-BSD FMLA CKD-EPI: 83 ML/MIN/1.73 M 2
GLOBULIN SER CALC-MCNC: 2.1 G/DL (ref 1.9–3.5)
GLUCOSE SERPL-MCNC: 218 MG/DL (ref 65–99)
HBA1C MFR BLD: 8.8 % (ref 4–5.6)
HCT VFR BLD AUTO: 40.2 % (ref 42–52)
HDLC SERPL-MCNC: 33 MG/DL
HGB BLD-MCNC: 14.1 G/DL (ref 14–18)
IMM GRANULOCYTES # BLD AUTO: 0.01 K/UL (ref 0–0.11)
IMM GRANULOCYTES NFR BLD AUTO: 0.2 % (ref 0–0.9)
LDLC SERPL CALC-MCNC: 60 MG/DL
LYMPHOCYTES # BLD AUTO: 1.21 K/UL (ref 1–4.8)
LYMPHOCYTES NFR BLD: 25.9 % (ref 22–41)
MCH RBC QN AUTO: 33.6 PG (ref 27–33)
MCHC RBC AUTO-ENTMCNC: 35.1 G/DL (ref 33.7–35.3)
MCV RBC AUTO: 95.7 FL (ref 81.4–97.8)
MICROALBUMIN UR-MCNC: 12 MG/DL
MICROALBUMIN/CREAT UR: 127 MG/G (ref 0–30)
MONOCYTES # BLD AUTO: 0.38 K/UL (ref 0–0.85)
MONOCYTES NFR BLD AUTO: 8.1 % (ref 0–13.4)
NEUTROPHILS # BLD AUTO: 2.74 K/UL (ref 1.82–7.42)
NEUTROPHILS NFR BLD: 58.5 % (ref 44–72)
NRBC # BLD AUTO: 0 K/UL
NRBC BLD-RTO: 0 /100 WBC
PLATELET # BLD AUTO: 172 K/UL (ref 164–446)
PMV BLD AUTO: 9.9 FL (ref 9–12.9)
POTASSIUM SERPL-SCNC: 4.5 MMOL/L (ref 3.6–5.5)
PROT SERPL-MCNC: 6.5 G/DL (ref 6–8.2)
RBC # BLD AUTO: 4.2 M/UL (ref 4.7–6.1)
SODIUM SERPL-SCNC: 141 MMOL/L (ref 135–145)
TRIGL SERPL-MCNC: 110 MG/DL (ref 0–149)
TSH SERPL DL<=0.005 MIU/L-ACNC: 3.47 UIU/ML (ref 0.38–5.33)
VIT B12 SERPL-MCNC: 1119 PG/ML (ref 211–911)
WBC # BLD AUTO: 4.7 K/UL (ref 4.8–10.8)

## 2022-06-30 PROCEDURE — 82306 VITAMIN D 25 HYDROXY: CPT

## 2022-06-30 PROCEDURE — 83036 HEMOGLOBIN GLYCOSYLATED A1C: CPT

## 2022-06-30 PROCEDURE — 80053 COMPREHEN METABOLIC PANEL: CPT

## 2022-06-30 PROCEDURE — 80061 LIPID PANEL: CPT

## 2022-06-30 PROCEDURE — 85025 COMPLETE CBC W/AUTO DIFF WBC: CPT

## 2022-06-30 PROCEDURE — 82043 UR ALBUMIN QUANTITATIVE: CPT

## 2022-06-30 PROCEDURE — 82570 ASSAY OF URINE CREATININE: CPT

## 2022-06-30 PROCEDURE — 36415 COLL VENOUS BLD VENIPUNCTURE: CPT

## 2022-06-30 PROCEDURE — 82607 VITAMIN B-12: CPT

## 2022-06-30 PROCEDURE — 84443 ASSAY THYROID STIM HORMONE: CPT

## 2022-07-05 ENCOUNTER — OFFICE VISIT (OUTPATIENT)
Dept: MEDICAL GROUP | Facility: PHYSICIAN GROUP | Age: 77
End: 2022-07-05
Payer: MEDICARE

## 2022-07-05 VITALS
OXYGEN SATURATION: 95 % | BODY MASS INDEX: 43.15 KG/M2 | WEIGHT: 274.9 LBS | DIASTOLIC BLOOD PRESSURE: 60 MMHG | SYSTOLIC BLOOD PRESSURE: 138 MMHG | HEART RATE: 78 BPM | TEMPERATURE: 97.3 F | HEIGHT: 67 IN | RESPIRATION RATE: 16 BRPM

## 2022-07-05 DIAGNOSIS — N39.41 URGE INCONTINENCE OF URINE: ICD-10-CM

## 2022-07-05 DIAGNOSIS — M50.121 CERVICAL DISC DISORDER AT C4-C5 LEVEL WITH RADICULOPATHY: ICD-10-CM

## 2022-07-05 DIAGNOSIS — F32.1 CURRENT MODERATE EPISODE OF MAJOR DEPRESSIVE DISORDER WITHOUT PRIOR EPISODE (HCC): ICD-10-CM

## 2022-07-05 DIAGNOSIS — R80.9 TYPE 2 DIABETES MELLITUS WITH MICROALBUMINURIA, WITHOUT LONG-TERM CURRENT USE OF INSULIN (HCC): ICD-10-CM

## 2022-07-05 DIAGNOSIS — I77.810 DILATED AORTIC ROOT (HCC): ICD-10-CM

## 2022-07-05 DIAGNOSIS — E03.9 ACQUIRED HYPOTHYROIDISM: ICD-10-CM

## 2022-07-05 DIAGNOSIS — E66.01 CLASS 3 SEVERE OBESITY WITH SERIOUS COMORBIDITY AND BODY MASS INDEX (BMI) OF 40.0 TO 44.9 IN ADULT, UNSPECIFIED OBESITY TYPE (HCC): ICD-10-CM

## 2022-07-05 DIAGNOSIS — Z85.46 PERSONAL HISTORY OF PROSTATE CANCER: ICD-10-CM

## 2022-07-05 DIAGNOSIS — I10 ESSENTIAL HYPERTENSION: ICD-10-CM

## 2022-07-05 DIAGNOSIS — E78.2 MIXED HYPERLIPIDEMIA: ICD-10-CM

## 2022-07-05 DIAGNOSIS — H81.03 MENIERE'S DISEASE OF BOTH EARS: ICD-10-CM

## 2022-07-05 DIAGNOSIS — R93.1 ABNORMAL NUCLEAR CARDIAC IMAGING TEST: ICD-10-CM

## 2022-07-05 DIAGNOSIS — E11.65 TYPE 2 DIABETES MELLITUS WITH HYPERGLYCEMIA, WITHOUT LONG-TERM CURRENT USE OF INSULIN (HCC): ICD-10-CM

## 2022-07-05 DIAGNOSIS — E11.29 TYPE 2 DIABETES MELLITUS WITH MICROALBUMINURIA, WITHOUT LONG-TERM CURRENT USE OF INSULIN (HCC): ICD-10-CM

## 2022-07-05 DIAGNOSIS — F32.5 MAJOR DEPRESSIVE DISORDER WITH SINGLE EPISODE, IN FULL REMISSION (HCC): ICD-10-CM

## 2022-07-05 PROBLEM — J45.909 ASTHMA: Status: RESOLVED | Noted: 2020-02-11 | Resolved: 2022-07-05

## 2022-07-05 PROBLEM — H91.90 HEARING LOSS: Status: RESOLVED | Noted: 2020-02-11 | Resolved: 2022-07-05

## 2022-07-05 PROBLEM — M79.645 FINGER PAIN, LEFT: Status: RESOLVED | Noted: 2020-02-11 | Resolved: 2022-07-05

## 2022-07-05 PROBLEM — D53.9 MACROCYTIC ANEMIA: Status: RESOLVED | Noted: 2021-07-01 | Resolved: 2022-07-05

## 2022-07-05 PROBLEM — Z78.9 STATIN INTOLERANCE: Status: RESOLVED | Noted: 2019-06-19 | Resolved: 2022-07-05

## 2022-07-05 PROBLEM — I25.10 CORONARY ARTERY CALCIFICATION SEEN ON CAT SCAN: Status: RESOLVED | Noted: 2020-08-03 | Resolved: 2022-07-05

## 2022-07-05 PROBLEM — R94.31 ABNORMAL EKG: Status: RESOLVED | Noted: 2020-08-03 | Resolved: 2022-07-05

## 2022-07-05 PROBLEM — E29.1 TESTICULAR HYPOFUNCTION: Status: RESOLVED | Noted: 2020-02-11 | Resolved: 2022-07-05

## 2022-07-05 PROBLEM — L60.8 TOENAIL DEFORMITY: Status: RESOLVED | Noted: 2019-06-19 | Resolved: 2022-07-05

## 2022-07-05 PROCEDURE — 99205 OFFICE O/P NEW HI 60 MIN: CPT | Performed by: INTERNAL MEDICINE

## 2022-07-05 RX ORDER — METFORMIN HYDROCHLORIDE 500 MG/1
1000 TABLET, EXTENDED RELEASE ORAL 2 TIMES DAILY
Qty: 400 TABLET | Refills: 3 | Status: SHIPPED | OUTPATIENT
Start: 2022-07-05 | End: 2023-07-10

## 2022-07-05 RX ORDER — METOPROLOL SUCCINATE 25 MG/1
25 TABLET, EXTENDED RELEASE ORAL
Qty: 100 TABLET | Refills: 3 | Status: SHIPPED | OUTPATIENT
Start: 2022-07-05 | End: 2022-10-05 | Stop reason: SDUPTHER

## 2022-07-05 RX ORDER — ROSUVASTATIN CALCIUM 20 MG/1
20 TABLET, COATED ORAL EVERY EVENING
Qty: 100 TABLET | Refills: 3 | Status: SHIPPED | OUTPATIENT
Start: 2022-07-05 | End: 2022-10-05 | Stop reason: SDUPTHER

## 2022-07-05 RX ORDER — LEVOTHYROXINE SODIUM 0.07 MG/1
75 TABLET ORAL
Qty: 100 TABLET | Refills: 3 | Status: SHIPPED | OUTPATIENT
Start: 2022-07-05 | End: 2022-09-23 | Stop reason: SDUPTHER

## 2022-07-05 RX ORDER — LOSARTAN POTASSIUM 50 MG/1
50 TABLET ORAL DAILY
Qty: 100 TABLET | Refills: 3 | Status: SHIPPED | OUTPATIENT
Start: 2022-07-05 | End: 2023-02-02 | Stop reason: SDUPTHER

## 2022-07-05 RX ORDER — EZETIMIBE 10 MG/1
10 TABLET ORAL DAILY
Qty: 100 TABLET | Refills: 3 | Status: SHIPPED | OUTPATIENT
Start: 2022-07-05 | End: 2022-10-05 | Stop reason: SDUPTHER

## 2022-07-05 RX ORDER — ESCITALOPRAM OXALATE 20 MG/1
20 TABLET ORAL
Qty: 100 TABLET | Refills: 3 | Status: SHIPPED | OUTPATIENT
Start: 2022-07-05 | End: 2022-09-23 | Stop reason: SDUPTHER

## 2022-07-05 ASSESSMENT — PATIENT HEALTH QUESTIONNAIRE - PHQ9: CLINICAL INTERPRETATION OF PHQ2 SCORE: 0

## 2022-07-05 ASSESSMENT — FIBROSIS 4 INDEX: FIB4 SCORE: 1.7

## 2022-07-05 NOTE — PROGRESS NOTES
Subjective:     CC:  Establish care    HISTORY OF THE PRESENT ILLNESS: Milton Javed Jr. is a 77 y.o. male here today to establish primary medical care and discuss the below stated chronic medical conditions. William is unaccompanied for today's visit.    Problem   Urge Incontinence of Urine    He reports issues with urinary frequency and urge incontinence.  He had prior treatment for prostate cancer with radiation and hormone therapy.  He reports he has not been given any medicines to assist with the incontinence but plans to follow-up with his urology team in the near future.     Abnormal Nuclear Cardiac Imaging Test    Small perfusion abnormality noted in Fall 2020, treated with medical therapy and recommended to optimize BLAYNE, weight, and other risk factors.     Class 3 Severe Obesity With Serious Comorbidity and Body Mass Index (Bmi) of 40.0 to 44.9 in Adult (Formerly [facility])    He has BMI of 43.7 with a weight of 274 pounds.  He has been under significant stress as he is a primary caregiver for his wife who is disabled and quite medically complex.  He is taking metformin.  Unclear if he be interested in additional pharmacotherapy to assist with weight loss.  A degree of this is also related to lower extremity edema.     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  He follows with [facility] [place].  He uses gabapentin which is helpful and very rarely will use oxycodone.     Dilated Aortic Root ([facility])    CTA Chest (2/2020): Ascending thoracic aorta is top normal in size at 4 cm in diameter.  No dissection.    Incidental finding on CT imaging in 2018, 4.1 cm ascending aortic aneurysm.  Follow-up imaging has showed stability, he follows with cardiology.     Major Depressive Disorder With Single Episode, in Full Remission (Former[facility])    Depression  Screening (7/5/2022)    Little interest or pleasure in doing things?  0 - not at all  Feeling down, depressed , or hopeless? 0 - not at all  Patient Health Questionnaire Score: 0    He reports labile mood with depression starting in June 2019, he was initiated on SSRI therapy at that time with 1 dose increase with resolution of the depression.  No known side effects from therapy, he would like to continue at this time.  Previously was taking what sounds to be a benzodiazepine which made him quite groggy.    Current regimen: escitalopram 20 mg daily     Essential Hypertension    He has history of hypertension but more recently has struggled with orthostasis with reduction of several medicines. He follows with cardiology, Dr. Cody.    Current regimen: metoprolol succinate 25 mg daily and losartan 50 mg daily     Personal History of Prostate Cancer     Latest Reference Range & Units 11/30/21 14:12   Prostatic Specific Antigen Tot 0.00 - 4.00 ng/mL 0.23       Diagnosed in 2010 with prostate cancer and treated with radiation and hormone therapy. He follows up annually with Dr. Billingsley.     Acquired Hypothyroidism     Latest Reference Range & Units 06/30/22 15:53   TSH 0.380 - 5.330 uIU/mL 3.470     Diagnosed based off abnormal labs in the past, notes dose was originally 50 mcg and then increased to 75 mcg. No signs/symptoms of over treatment or undertreatment at this time.    Current regimen: levothyroxine 75 mcg daily     Type 2 diabetes mellitus with microalbuminuria, without long-term current use of insulin (HCC)     Latest Reference Range & Units 10/07/21 11:40 06/30/22 15:53   Glycohemoglobin 4.0 - 5.6 % 6.2 ! 8.8 (H)   Estim. Avg Glu mg/dL  206      Latest Reference Range & Units 06/30/22 15:55   Micro Alb Creat Ratio 0 - 30 mg/g 127 (H)     Diagnosed in August 2020, treated with metformin, recent deterioration in Summer 2022 due to his wife's health decline and dietary indiscretion and decreased activity.  Metformin was reduced in December 2021 from 1000 mg BID to 500 mg BID which may also be contributing. Now with +microalbuminuria. No known neuropathy or retinopathy. He is on statin, aspirin, and now back on ARB.    Current regimen: metformin  mg BID     Mixed Hyperlipidemia     Latest Reference Range & Units 06/30/22 15:53   Cholesterol,Tot 100 - 199 mg/dL 115   Triglycerides 0 - 149 mg/dL 110   HDL >=40 mg/dL 33 !   LDL <100 mg/dL 60     He has longstanding history of elevated cholesterol, start on statin therapy with resultant improvement, has not been able to get HDL up about 40.    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     Macrocytic Anemia (Resolved)   Abnormal Ekg (Resolved)   Coronary Artery Calcification Seen On Cat Scan (Resolved)   Asthma (Resolved)   Testicular Hypofunction (Resolved)   Finger Pain, Left (Resolved)   Hearing Loss (Resolved)   Statin Intolerance (Resolved)   Toenail Deformity (Resolved)        Current Medications:  Current Outpatient Medications Ordered in Epic   Medication Sig Dispense Refill   • losartan (COZAAR) 50 MG Tab Take 1 Tablet by mouth every day. 100 Tablet 3   • rosuvastatin (CRESTOR) 20 MG Tab Take 1 Tablet by mouth every evening. 100 Tablet 3   • metoprolol SR (TOPROL XL) 25 MG TABLET SR 24 HR Take 1 Tablet by mouth every day. 100 Tablet 3   • metFORMIN ER (GLUCOPHAGE XR) 500 MG TABLET SR 24 HR Take 2 Tablets by mouth 2 times a day. 400 Tablet 3   • levothyroxine (SYNTHROID) 75 MCG Tab Take 1 Tablet by mouth every morning on an empty stomach. 100 Tablet 3   • ezetimibe (ZETIA) 10 MG Tab Take 1 Tablet by mouth every day. 100 Tablet 3   • escitalopram (LEXAPRO) 20 MG tablet Take 1 Tablet by mouth every day. 100 Tablet 3   • Blood Glucose Monitoring Suppl Device Meter: Dispense insurance preffered  "meter. Sig. Use as directed for blood sugar monitoring. 1 Each 0   • Blood Glucose Test Strips Test strips order: Test strips for insurance approved meter. Sig: use BID and prn ssx high or low sugar, max 4x/d 100 Strip 3   • Lancets Lancets order: Lancets for insurance preffered meter. Sig: use BID and prn ssx high or low sugar Max 4x/day 100 Each 3   • oxyCODONE immediate-release (ROXICODONE) 5 MG Tab Take 5 mg by mouth every four hours as needed for Severe Pain.     • gabapentin (NEURONTIN) 300 MG Cap Take 300 mg by mouth 3 times a day.     • aspirin (ASA) 81 MG Chew Tab chewable tablet Take 81 mg by mouth every day.     • B Complex Vitamins (VITAMIN B COMPLEX PO) Take  by mouth.     • Blood Glucose Monitoring Suppl SUPPLIES Misc Test strips order: Test strips for [employer] Cont[employer] Next meter. Sig: use daily for medication adjustment.  Dx. Code E11.65 100 Strip 3   • Cholecalciferol (VITAMIN D3 PO) Take  by mouth.     • Fish Oil Oil by Does not apply route.     • Ascorbic Acid (VITAMIN C) 1000 MG Tab Take  by mouth.     • albuterol 108 (90 BASE) MCG/ACT Aero Soln inhalation aerosol Inhale 2 Puffs by mouth every 6 hours as needed for Shortness of Breath. 8.5 g 3     No current [facility]-ordered facility-administered medications on file.       PMH, PSH, Social History, Medications, Allergies, FMH updated and reviewed as documented:    Objective:   Physical Exam:    Vitals: /60 (BP Location: Left arm, Patient Position: Sitting, BP Cuff Size: Adult)   Pulse 78   Temp 36.3 °C (97.3 °F) (Temporal)   Resp 16   Ht 1.689 m (5' 6.5\")   Wt 125 kg (274 lb 14.4 oz)   SpO2 95%    BMI: Body mass index is 43.71 kg/m².  Physical Exam  Constitutional:       General: He is not in acute distress.     Appearance: Normal appearance. He is obese. He is not ill-appearing.   HENT:      Right Ear: There is no impacted cerumen.      Left Ear: There is no impacted cerumen.      Ears:      Comments: Hard of hearing  Eyes:      General: No " scleral icterus.     Conjunctiva/sclera: Conjunctivae normal.   Cardiovascular:      Rate and Rhythm: Normal rate and regular rhythm.      Pulses: Normal pulses.   Pulmonary:      Effort: Pulmonary effort is normal. No respiratory distress.      Breath sounds: No wheezing or rhonchi.   Abdominal:      General: There is no distension.      Palpations: Abdomen is soft.      Tenderness: There is no abdominal tenderness.      Comments: Protuberant abdomen limits exam   Musculoskeletal:      Right lower leg: Edema present.      Left lower leg: Edema present.      Comments: 1+ pretibial bilaterally   Skin:     Findings: No bruising or rash.   Psychiatric:         Mood and Affect: Mood normal.         Behavior: Behavior normal.         Thought Content: Thought content normal.         Judgment: Judgment normal.          Assessment & Plan:   Milton is a 77 y.o. male with the following:  Problem List Items Addressed This Visit     Essential hypertension     Chronic and ongoing problem, blood pressure stable, triamterene-hctz and nifedipine currently on hold. He will follow up with cardiology in Sept 2022. Continue metoprolol succ 25 mg daily and add back losartan 50 mg daily due to microalbuminuria.            Relevant Medications    losartan (COZAAR) 50 MG Tab    rosuvastatin (CRESTOR) 20 MG Tab    metoprolol SR (TOPROL XL) 25 MG TABLET SR 24 HR    ezetimibe (ZETIA) 10 MG Tab    Personal history of prostate cancer     Previous problem, no sign of recurrence, continue follow up with urology as recommended.           Acquired hypothyroidism     Chronic and stable problem, levels are well controlled, continue current regimen with levothyroxine 75 mcg daily.           Relevant Medications    levothyroxine (SYNTHROID) 75 MCG Tab    Type 2 diabetes mellitus with microalbuminuria, without long-term current use of insulin (HCC)     Chronic and decompensated problem.  Recent deterioration in A1c from 6.2 up to 8.8. there was a lab  issue on 6/25/2022 so unclear if that could have affected his reading on 6/30/2022? He does admit dietary deterioration and lack of exercise related to worsening health with his wife. Recommend he increase metformin back to 1000 mg Bid and resume losartan 50 mg daily due to microalbuminuria. Recheck again for both in 3 months.           Relevant Medications    losartan (COZAAR) 50 MG Tab    metFORMIN ER (GLUCOPHAGE XR) 500 MG TABLET SR 24 HR    Mixed hyperlipidemia     Chronic and stable problem, has struggled to get his HDL above 40 but otherwise his remaining cholesterol parameters are at goal.  Continue rosuvastatin 20 mg daily and Zetia 10 mg daily and follow-up with cardiology as recommended.           Relevant Medications    losartan (COZAAR) 50 MG Tab    rosuvastatin (CRESTOR) 20 MG Tab    metoprolol SR (TOPROL XL) 25 MG TABLET SR 24 HR    ezetimibe (ZETIA) 10 MG Tab    Meniere's disease     Previous problem, unclear if he is met with ENT but this has been a longstanding problem for over 40 years which started after a fungal spinal meningitis episode.           Major depressive disorder with single episode, in full remission (HCC)     Chronic and stable problem, mood currently well controlled, continue escitalopram 20 mg daily.           Relevant Medications    escitalopram (LEXAPRO) 20 MG tablet    Dilated aortic root (HCC)     Chronic and ongoing problem, continue follow-up with cardiology as recommended.           Relevant Medications    losartan (COZAAR) 50 MG Tab    rosuvastatin (CRESTOR) 20 MG Tab    metoprolol SR (TOPROL XL) 25 MG TABLET SR 24 HR    ezetimibe (ZETIA) 10 MG Tab    Cervical disc disorder at C4-C5 level with radiculopathy     Chronic and ongoing problem, continue follow-up with [facility] [place], he has not had a good opportunity to schedule surgery as he is a primary caregiver for his wife who is medically complicated.  Continue with gabapentin for symptom control, he has used oxycodone  perhaps 3 or 4 times since a prescription was given to him.           Class 3 severe obesity with serious comorbidity and body mass index (BMI) of 40.0 to 44.9 in adult (HCC)     Chronic and ongoing problem.  He continues to be under significant stress related to his wife's medical conditions, now it appears that she may have a new diagnosis of metastatic brain cancer.  Will discuss again in the future, could consider GLP-1 to assist with both weight loss and his diabetes.  We will have him return to clinic again in 3 months.           Relevant Medications    metFORMIN ER (GLUCOPHAGE XR) 500 MG TABLET SR 24 HR    Abnormal nuclear cardiac imaging test     Follow up with cardiology scheduled in 2 months. Modify risk factors and focus on weight loss, treatment of BLAYNE, good control of DM2, etc.           Urge incontinence of urine     Chronic and ongoing issue, discussed that this may be a primary bladder problem and try mirabegron 25 mg daily, he will discuss with urology.  Also need to get his blood sugar better controlled that could be contributing.  He has history of prostate cancer treated with radiation and hormone deprivation therapy.                    Annual Health Assessment Questions:    1.  Are you currently engaging in any exercise or physical activity? No    2.  How would you describe your mood or emotional well-being today? good    3.  Have you had any falls in the last year? Yes    4.  Have you noticed any problems with your balance or had difficulty walking? Yes  Has problems with balance  Off and on since 1980's  Infection in spinal column  Replacement of fluid  And ever since then balance off     5.  In the last six months have you experienced any leakage of urine? Yes    6. DPA/Advanced Directive: Patient has Advanced Directive, but it is not on file. Instructed to bring in a copy to scan into their chart.       RTC: Return in about 3 months (around 10/5/2022).    I spent a total of 64 minutes with  record review, exam, communication with the patient, communication with other providers, and documentation of this encounter.    PLEASE NOTE: This dictation was created using voice recognition software. I have made every reasonable attempt to correct obvious errors, but I expect that there are errors of grammar and possibly content that I did not discover before finalizing the note.      Agnes Genao, DO  Geriatric and Internal Medicine  [facility]

## 2022-07-06 NOTE — ASSESSMENT & PLAN NOTE
Chronic and ongoing problem, continue follow-up with [facility] [place], he has not had a good opportunity to schedule surgery as he is a primary caregiver for his wife who is medically complicated.  Continue with gabapentin for symptom control, he has used oxycodone perhaps 3 or 4 times since a prescription was given to him.

## 2022-07-06 NOTE — ASSESSMENT & PLAN NOTE
Chronic and ongoing issue, discussed that this may be a primary bladder problem and try mirabegron 25 mg daily, he will discuss with urology.  Also need to get his blood sugar better controlled that could be contributing.  He has history of prostate cancer treated with radiation and hormone deprivation therapy.

## 2022-07-06 NOTE — ASSESSMENT & PLAN NOTE
Chronic and stable problem, levels are well controlled, continue current regimen with levothyroxine 75 mcg daily.

## 2022-07-06 NOTE — ASSESSMENT & PLAN NOTE
Previous problem, unclear if he is met with ENT but this has been a longstanding problem for over 40 years which started after a fungal spinal meningitis episode.

## 2022-07-06 NOTE — ASSESSMENT & PLAN NOTE
Chronic and stable problem, has struggled to get his HDL above 40 but otherwise his remaining cholesterol parameters are at goal.  Continue rosuvastatin 20 mg daily and Zetia 10 mg daily and follow-up with cardiology as recommended.

## 2022-07-06 NOTE — ASSESSMENT & PLAN NOTE
Follow up with cardiology scheduled in 2 months. Modify risk factors and focus on weight loss, treatment of BLAYNE, good control of DM2, etc.

## 2022-07-06 NOTE — ASSESSMENT & PLAN NOTE
Chronic and ongoing problem, blood pressure stable, triamterene-hctz and nifedipine currently on hold. He will follow up with cardiology in Sept 2022. Continue metoprolol succ 25 mg daily and add back losartan 50 mg daily due to microalbuminuria.

## 2022-07-06 NOTE — ASSESSMENT & PLAN NOTE
Chronic and ongoing problem.  He continues to be under significant stress related to his wife's medical conditions, now it appears that she may have a new diagnosis of metastatic brain cancer.  Will discuss again in the future, could consider GLP-1 to assist with both weight loss and his diabetes.  We will have him return to clinic again in 3 months.

## 2022-07-06 NOTE — ASSESSMENT & PLAN NOTE
Chronic and decompensated problem.  Recent deterioration in A1c from 6.2 up to 8.8. there was a lab issue on 6/25/2022 so unclear if that could have affected his reading on 6/30/2022? He does admit dietary deterioration and lack of exercise related to worsening health with his wife. Recommend he increase metformin back to 1000 mg Bid and resume losartan 50 mg daily due to microalbuminuria. Recheck again for both in 3 months.

## 2022-08-05 ENCOUNTER — APPOINTMENT (OUTPATIENT)
Dept: LAB | Facility: MEDICAL CENTER | Age: 77
End: 2022-08-05
Attending: FAMILY MEDICINE
Payer: MEDICARE

## 2022-08-25 ENCOUNTER — HOSPITAL ENCOUNTER (OUTPATIENT)
Dept: LAB | Facility: MEDICAL CENTER | Age: 77
End: 2022-08-25
Attending: PHYSICIAN ASSISTANT
Payer: MEDICARE

## 2022-08-25 LAB — PSA SERPL-MCNC: 0.27 NG/ML (ref 0–4)

## 2022-08-25 PROCEDURE — 84153 ASSAY OF PSA TOTAL: CPT

## 2022-08-25 PROCEDURE — 36415 COLL VENOUS BLD VENIPUNCTURE: CPT

## 2022-09-23 DIAGNOSIS — F32.5 MAJOR DEPRESSIVE DISORDER WITH SINGLE EPISODE, IN FULL REMISSION (HCC): ICD-10-CM

## 2022-09-23 DIAGNOSIS — E03.9 ACQUIRED HYPOTHYROIDISM: ICD-10-CM

## 2022-09-24 RX ORDER — ESCITALOPRAM OXALATE 20 MG/1
20 TABLET ORAL
Qty: 100 TABLET | Refills: 3 | Status: SHIPPED | OUTPATIENT
Start: 2022-09-24 | End: 2023-11-30

## 2022-09-24 RX ORDER — LEVOTHYROXINE SODIUM 0.07 MG/1
75 TABLET ORAL
Qty: 100 TABLET | Refills: 3 | Status: SHIPPED | OUTPATIENT
Start: 2022-09-24 | End: 2023-02-02 | Stop reason: SDUPTHER

## 2022-10-04 ENCOUNTER — APPOINTMENT (OUTPATIENT)
Dept: MEDICAL GROUP | Facility: PHYSICIAN GROUP | Age: 77
End: 2022-10-04
Payer: MEDICARE

## 2022-10-05 DIAGNOSIS — I10 ESSENTIAL HYPERTENSION: ICD-10-CM

## 2022-10-05 DIAGNOSIS — E78.2 MIXED HYPERLIPIDEMIA: ICD-10-CM

## 2022-10-05 RX ORDER — EZETIMIBE 10 MG/1
10 TABLET ORAL DAILY
Qty: 100 TABLET | Refills: 3 | Status: SHIPPED | OUTPATIENT
Start: 2022-10-05 | End: 2023-02-02 | Stop reason: SDUPTHER

## 2022-10-05 RX ORDER — ROSUVASTATIN CALCIUM 20 MG/1
20 TABLET, COATED ORAL EVERY EVENING
Qty: 100 TABLET | Refills: 3 | Status: SHIPPED | OUTPATIENT
Start: 2022-10-05 | End: 2023-01-21 | Stop reason: SDUPTHER

## 2022-10-05 RX ORDER — METOPROLOL SUCCINATE 25 MG/1
25 TABLET, EXTENDED RELEASE ORAL
Qty: 100 TABLET | Refills: 3 | Status: SHIPPED | OUTPATIENT
Start: 2022-10-05 | End: 2023-02-02 | Stop reason: SDUPTHER

## 2022-11-09 ENCOUNTER — OFFICE VISIT (OUTPATIENT)
Dept: MEDICAL GROUP | Facility: PHYSICIAN GROUP | Age: 77
End: 2022-11-09
Payer: MEDICARE

## 2022-11-09 ENCOUNTER — HOSPITAL ENCOUNTER (OUTPATIENT)
Facility: MEDICAL CENTER | Age: 77
End: 2022-11-09
Attending: INTERNAL MEDICINE
Payer: MEDICARE

## 2022-11-09 VITALS
DIASTOLIC BLOOD PRESSURE: 64 MMHG | HEART RATE: 65 BPM | WEIGHT: 280 LBS | TEMPERATURE: 97 F | SYSTOLIC BLOOD PRESSURE: 138 MMHG | RESPIRATION RATE: 16 BRPM | OXYGEN SATURATION: 96 % | BODY MASS INDEX: 43.95 KG/M2 | HEIGHT: 67 IN

## 2022-11-09 DIAGNOSIS — R80.9 TYPE 2 DIABETES MELLITUS WITH MICROALBUMINURIA, WITHOUT LONG-TERM CURRENT USE OF INSULIN (HCC): ICD-10-CM

## 2022-11-09 DIAGNOSIS — Z23 NEED FOR VACCINATION: ICD-10-CM

## 2022-11-09 DIAGNOSIS — E78.2 MIXED HYPERLIPIDEMIA: ICD-10-CM

## 2022-11-09 DIAGNOSIS — E11.29 TYPE 2 DIABETES MELLITUS WITH MICROALBUMINURIA, WITHOUT LONG-TERM CURRENT USE OF INSULIN (HCC): ICD-10-CM

## 2022-11-09 DIAGNOSIS — E03.9 ACQUIRED HYPOTHYROIDISM: ICD-10-CM

## 2022-11-09 LAB
HBA1C MFR BLD: 9.5 % (ref 0–5.6)
INT CON NEG: NEGATIVE
INT CON POS: POSITIVE

## 2022-11-09 PROCEDURE — 99214 OFFICE O/P EST MOD 30 MIN: CPT | Mod: 25 | Performed by: INTERNAL MEDICINE

## 2022-11-09 PROCEDURE — 83036 HEMOGLOBIN GLYCOSYLATED A1C: CPT | Performed by: INTERNAL MEDICINE

## 2022-11-09 PROCEDURE — 90662 IIV NO PRSV INCREASED AG IM: CPT | Performed by: INTERNAL MEDICINE

## 2022-11-09 PROCEDURE — 82570 ASSAY OF URINE CREATININE: CPT

## 2022-11-09 PROCEDURE — G0008 ADMIN INFLUENZA VIRUS VAC: HCPCS | Performed by: INTERNAL MEDICINE

## 2022-11-09 PROCEDURE — 82043 UR ALBUMIN QUANTITATIVE: CPT

## 2022-11-09 RX ORDER — NIFEDIPINE 90 MG/1
TABLET, EXTENDED RELEASE ORAL
COMMUNITY
End: 2022-11-09

## 2022-11-09 RX ORDER — PIOGLITAZONEHYDROCHLORIDE 30 MG/1
30 TABLET ORAL DAILY
Qty: 100 TABLET | Refills: 3 | Status: SHIPPED | OUTPATIENT
Start: 2022-11-09 | End: 2023-02-02 | Stop reason: SDUPTHER

## 2022-11-09 RX ORDER — TRIAMTERENE AND HYDROCHLOROTHIAZIDE 75; 50 MG/1; MG/1
TABLET ORAL
COMMUNITY
End: 2022-11-09

## 2022-11-09 ASSESSMENT — FIBROSIS 4 INDEX: FIB4 SCORE: 1.7

## 2022-11-10 LAB
CREAT UR-MCNC: 53.69 MG/DL
MICROALBUMIN UR-MCNC: 4.2 MG/DL
MICROALBUMIN/CREAT UR: 78 MG/G (ref 0–30)

## 2022-11-10 NOTE — ASSESSMENT & PLAN NOTE
Chronic and decompensated.  Discussed with him that his levels are out of control.  He admits he is only taking metformin 500 mg.  Advised him to increase this to 2 tabs twice daily and add pioglitazone 30 mg daily.  His insurance will cover a sooner recheck on A1c so we will have him come in for medical assistant visit in 6 weeks to follow-up and make sure is trending down.  If his A1c continues to climb then I would consider looking at pancreas to see if there is a new primary issue that is causing her diabetes better control outside of the dietary indiscretion noncompliance and medication issues.  He believes he has plenty of metformin at home and will make sure he starts taking the appropriate dose immediately.

## 2022-11-10 NOTE — PROGRESS NOTES
Subjective:   Chief Complaint/History of Present Illness:  Milton Javed Jr. is a 77 y.o. male established patient who presents today to discuss medical problems as listed below. William is unaccompanied for today's visit.    Problem   Acquired Hypothyroidism     Latest Reference Range & Units 06/30/22 15:53   TSH 0.380 - 5.330 uIU/mL 3.470     Diagnosed based off abnormal labs in the past, notes dose was originally 50 mcg and then increased to 75 mcg. No signs/symptoms of over treatment or undertreatment at this time.    Current regimen: levothyroxine 75 mcg daily     Type 2 diabetes mellitus with microalbuminuria, without long-term current use of insulin (HCC)     Latest Reference Range & Units 06/23/21 09:52 10/07/21 11:40 06/30/22 15:53 11/09/22 17:00   Glycohemoglobin 0.0 - 5.6 % 6.8 (H) 6.2 ! 8.8 (H) 9.5 !   Estim. Avg Glu mg/dL 148  206       Latest Reference Range & Units 06/30/22 15:55   Micro Alb Creat Ratio 0 - 30 mg/g 127 (H)     Diagnosed in August 2020, treated with metformin, recent deterioration in Summer/Fall 2022 due to his wife's health decline and dietary indiscretion and decreased activity. Metformin was reduced in December 2021 from 1000 mg BID to 500 mg BID which may also be contributing. Now with +microalbuminuria. No known neuropathy or retinopathy. He is on statin, aspirin, and now back on ARB.    Current regimen: metformin ER 1000 mg BID and pioglitazone 30 mg daily     Mixed Hyperlipidemia     Latest Reference Range & Units 06/30/22 15:53   Cholesterol,Tot 100 - 199 mg/dL 115   Triglycerides 0 - 149 mg/dL 110   HDL >=40 mg/dL 33 !   LDL <100 mg/dL 60     He has longstanding history of elevated cholesterol, start on statin therapy with resultant improvement, has not been able to get HDL up about 40.    Current regimen: rosuvastatin 20 mg daily and Zetia 10 mg daily          Current Medications:  Current Outpatient Medications Ordered in Epic   Medication Sig Dispense Refill     pioglitazone (ACTOS) 30 MG Tab Take 1 Tablet by mouth every day. 100 Tablet 3    rosuvastatin (CRESTOR) 20 MG Tab Take 1 Tablet by mouth every evening. 100 Tablet 3    metoprolol SR (TOPROL XL) 25 MG TABLET SR 24 HR Take 1 Tablet by mouth every day. 100 Tablet 3    ezetimibe (ZETIA) 10 MG Tab Take 1 Tablet by mouth every day. 100 Tablet 3    Nystatin Powder 1 Application 4 times a day as needed (skin fold irritation). 1 Each 3    levothyroxine (SYNTHROID) 75 MCG Tab Take 1 Tablet by mouth every morning on an empty stomach. 100 Tablet 3    escitalopram (LEXAPRO) 20 MG tablet Take 1 Tablet by mouth every day. 100 Tablet 3    losartan (COZAAR) 50 MG Tab Take 1 Tablet by mouth every day. 100 Tablet 3    metFORMIN ER (GLUCOPHAGE XR) 500 MG TABLET SR 24 HR Take 2 Tablets by mouth 2 times a day. 400 Tablet 3    Blood Glucose Monitoring Suppl Device Meter: Dispense insurance preffered meter. Sig. Use as directed for blood sugar monitoring. 1 Each 0    Blood Glucose Test Strips Test strips order: Test strips for insurance approved meter. Sig: use BID and prn ssx high or low sugar, max 4x/d 100 Strip 3    Lancets Lancets order: Lancets for insurance preffered meter. Sig: use BID and prn ssx high or low sugar Max 4x/day 100 Each 3    oxyCODONE immediate-release (ROXICODONE) 5 MG Tab Take 1 Tablet by mouth every four hours as needed for Severe Pain.      gabapentin (NEURONTIN) 300 MG Cap Take 1 Capsule by mouth 3 times a day.      aspirin (ASA) 81 MG Chew Tab chewable tablet Chew 1 Tablet every day.      B Complex Vitamins (VITAMIN B COMPLEX PO) Take  by mouth.      Blood Glucose Monitoring Suppl SUPPLIES Misc Test strips order: Test strips for [employer] Contour Next meter. Sig: use daily for medication adjustment.  Dx. Code E11.65 100 Strip 3    Cholecalciferol (VITAMIN D3 PO) Take  by mouth.      Fish Oil Oil by Does not apply route.      Ascorbic Acid (VITAMIN C) 1000 MG Tab Take  by mouth.      albuterol 108 (90 BASE) MCG/ACT  "Aero Soln inhalation aerosol Inhale 2 Puffs by mouth every 6 hours as needed for Shortness of Breath. 8.5 g 3     No current [facility]-ordered facility-administered medications on file.          Objective:   Physical Exam:    Vitals: /64   Pulse 65   Temp 36.1 °C (97 °F) (Temporal)   Resp 16   Ht 1.702 m (5' 7\")   Wt (!) 127 kg (280 lb)   SpO2 96%    BMI: Body mass index is 43.85 kg/m².  Physical Exam  Constitutional:       General: He is not in acute distress.     Appearance: Normal appearance. He is obese. He is not ill-appearing.   Eyes:      Conjunctiva/sclera: Conjunctivae normal.   Cardiovascular:      Rate and Rhythm: Normal rate and regular rhythm.      Pulses: Normal pulses.   Pulmonary:      Effort: Pulmonary effort is normal. No respiratory distress.      Breath sounds: No wheezing or rhonchi.   Skin:     General: Skin is warm and dry.      Findings: No rash.      Comments: Dry skin on scalp/behind ears bilaterally   Neurological:      Gait: Gait abnormal.      Comments: Shuffled, walks without a gait aid   Psychiatric:         Mood and Affect: Mood normal.         Behavior: Behavior normal.         Thought Content: Thought content normal.         Judgment: Judgment normal.        Assessment and Plan:   Milton is a 77 y.o. male with the following:  Problem List Items Addressed This Visit       Acquired hypothyroidism     Chronic and stable problem.  Update thyroid labs before next visit.  Continue levothyroxine 75 mcg daily.  No signs or symptoms of overtreatment under treatment currently.         Type 2 diabetes mellitus with microalbuminuria, without long-term current use of insulin (HCC)     Chronic and decompensated.  Discussed with him that his levels are out of control.  He admits he is only taking metformin 500 mg.  Advised him to increase this to 2 tabs twice daily and add pioglitazone 30 mg daily.  His insurance will cover a sooner recheck on A1c so we will have him come in for medical " assistant visit in 6 weeks to follow-up and make sure is trending down.  If his A1c continues to climb then I would consider looking at pancreas to see if there is a new primary issue that is causing her diabetes better control outside of the dietary indiscretion noncompliance and medication issues.  He believes he has plenty of metformin at home and will make sure he starts taking the appropriate dose immediately.           Relevant Medications    pioglitazone (ACTOS) 30 MG Tab    Other Relevant Orders    MICROALBUMIN CREAT RATIO URINE    CBC WITH DIFFERENTIAL    Comp Metabolic Panel    Mixed hyperlipidemia     Chronic and stable problem, update lab values before next visit to make sure levels are at goal. Continue current regimen with rosuvastatin 20 mg daily and zetia 10 mg daily.          Relevant Orders    Lipid Profile    TSH WITH REFLEX TO FT4     Other Visit Diagnoses       Need for vaccination                RTC: Return in about 3 months (around 2/9/2023) for 6 weeks MA visit for A1c.    I spent a total of 36 minutes with record review, exam, communication with the patient, communication with other providers, and documentation of this encounter.    PLEASE NOTE: This dictation was created using voice recognition software. I have made every reasonable attempt to correct obvious errors, but I expect that there are errors of grammar and possibly content that I did not discover before finalizing the note.      Agnes Genao, DO  Geriatric and Internal Medicine  [facility]

## 2022-11-10 NOTE — ASSESSMENT & PLAN NOTE
Chronic and stable problem, update lab values before next visit to make sure levels are at goal. Continue current regimen with rosuvastatin 20 mg daily and zetia 10 mg daily.

## 2022-12-21 ENCOUNTER — NON-PROVIDER VISIT (OUTPATIENT)
Dept: MEDICAL GROUP | Facility: PHYSICIAN GROUP | Age: 77
End: 2022-12-21
Payer: MEDICARE

## 2022-12-21 DIAGNOSIS — E11.29 TYPE 2 DIABETES MELLITUS WITH MICROALBUMINURIA, WITHOUT LONG-TERM CURRENT USE OF INSULIN (HCC): ICD-10-CM

## 2022-12-21 DIAGNOSIS — R80.9 TYPE 2 DIABETES MELLITUS WITH MICROALBUMINURIA, WITHOUT LONG-TERM CURRENT USE OF INSULIN (HCC): ICD-10-CM

## 2022-12-21 LAB
HBA1C MFR BLD: 7.2 % (ref 0–5.6)
INT CON NEG: ABNORMAL
INT CON POS: ABNORMAL

## 2022-12-21 PROCEDURE — 83036 HEMOGLOBIN GLYCOSYLATED A1C: CPT | Performed by: INTERNAL MEDICINE

## 2022-12-21 NOTE — NON-PROVIDER
William Javed Jr. is a 77 y.o. male here for a non-provider visit for 6 week check and A1C    If abnormal was an in office provider notified today (if so, indicate provider)? Yes    Routed to PCP? Yes      A1C TODAY WAS 7.2

## 2023-01-21 ENCOUNTER — PATIENT MESSAGE (OUTPATIENT)
Dept: MEDICAL GROUP | Facility: PHYSICIAN GROUP | Age: 78
End: 2023-01-21
Payer: MEDICARE

## 2023-01-21 DIAGNOSIS — L30.4 INTERTRIGO: ICD-10-CM

## 2023-01-21 DIAGNOSIS — E78.2 MIXED HYPERLIPIDEMIA: ICD-10-CM

## 2023-01-23 RX ORDER — ROSUVASTATIN CALCIUM 20 MG/1
20 TABLET, COATED ORAL EVERY EVENING
Qty: 100 TABLET | Refills: 3 | Status: SHIPPED | OUTPATIENT
Start: 2023-01-23 | End: 2024-03-07 | Stop reason: SDUPTHER

## 2023-01-23 RX ORDER — NYSTATIN 10B UNIT
1 POWDER (EA) MISCELLANEOUS 4 TIMES DAILY PRN
Qty: 1 EACH | Refills: 3 | Status: SHIPPED | OUTPATIENT
Start: 2023-01-23 | End: 2023-02-20 | Stop reason: SDUPTHER

## 2023-01-23 NOTE — PATIENT COMMUNICATION
Received request via: Pharmacy    Was the patient seen in the last year in this department? Yes    Does the patient have an active prescription (recently filled or refills available) for medication(s) requested? No    Does the patient have skilled nursing Plus and need 100 day supply (blood pressure, diabetes and cholesterol meds only)? Yes, quantity updated to 100 days

## 2023-02-02 DIAGNOSIS — E11.65 TYPE 2 DIABETES MELLITUS WITH HYPERGLYCEMIA, WITHOUT LONG-TERM CURRENT USE OF INSULIN (HCC): ICD-10-CM

## 2023-02-02 DIAGNOSIS — E78.2 MIXED HYPERLIPIDEMIA: ICD-10-CM

## 2023-02-02 DIAGNOSIS — E03.9 ACQUIRED HYPOTHYROIDISM: ICD-10-CM

## 2023-02-02 DIAGNOSIS — I10 ESSENTIAL HYPERTENSION: ICD-10-CM

## 2023-02-02 DIAGNOSIS — R80.9 TYPE 2 DIABETES MELLITUS WITH MICROALBUMINURIA, WITHOUT LONG-TERM CURRENT USE OF INSULIN (HCC): ICD-10-CM

## 2023-02-02 DIAGNOSIS — E11.29 TYPE 2 DIABETES MELLITUS WITH MICROALBUMINURIA, WITHOUT LONG-TERM CURRENT USE OF INSULIN (HCC): ICD-10-CM

## 2023-02-02 RX ORDER — LEVOTHYROXINE SODIUM 0.07 MG/1
75 TABLET ORAL
Qty: 100 TABLET | Refills: 3 | Status: SHIPPED | OUTPATIENT
Start: 2023-02-02 | End: 2024-02-20 | Stop reason: SDUPTHER

## 2023-02-02 RX ORDER — EZETIMIBE 10 MG/1
10 TABLET ORAL DAILY
Qty: 100 TABLET | Refills: 3 | Status: SHIPPED | OUTPATIENT
Start: 2023-02-02 | End: 2024-03-04 | Stop reason: SDUPTHER

## 2023-02-02 RX ORDER — PIOGLITAZONEHYDROCHLORIDE 30 MG/1
30 TABLET ORAL DAILY
Qty: 100 TABLET | Refills: 3 | Status: SHIPPED | OUTPATIENT
Start: 2023-02-02

## 2023-02-02 RX ORDER — LOSARTAN POTASSIUM 50 MG/1
50 TABLET ORAL DAILY
Qty: 100 TABLET | Refills: 3 | Status: SHIPPED | OUTPATIENT
Start: 2023-02-02 | End: 2024-01-02

## 2023-02-02 RX ORDER — METOPROLOL SUCCINATE 25 MG/1
25 TABLET, EXTENDED RELEASE ORAL
Qty: 100 TABLET | Refills: 3 | Status: SHIPPED | OUTPATIENT
Start: 2023-02-02

## 2023-02-13 ENCOUNTER — OFFICE VISIT (OUTPATIENT)
Dept: MEDICAL GROUP | Facility: PHYSICIAN GROUP | Age: 78
End: 2023-02-13
Payer: MEDICARE

## 2023-02-13 VITALS
SYSTOLIC BLOOD PRESSURE: 126 MMHG | OXYGEN SATURATION: 95 % | TEMPERATURE: 97.4 F | HEIGHT: 67 IN | WEIGHT: 281 LBS | DIASTOLIC BLOOD PRESSURE: 58 MMHG | BODY MASS INDEX: 44.1 KG/M2 | HEART RATE: 63 BPM | RESPIRATION RATE: 18 BRPM

## 2023-02-13 DIAGNOSIS — L21.9 SEBORRHEIC DERMATITIS: ICD-10-CM

## 2023-02-13 DIAGNOSIS — E66.01 CLASS 3 SEVERE OBESITY WITH SERIOUS COMORBIDITY AND BODY MASS INDEX (BMI) OF 40.0 TO 44.9 IN ADULT, UNSPECIFIED OBESITY TYPE (HCC): ICD-10-CM

## 2023-02-13 DIAGNOSIS — R80.9 TYPE 2 DIABETES MELLITUS WITH MICROALBUMINURIA, WITHOUT LONG-TERM CURRENT USE OF INSULIN (HCC): ICD-10-CM

## 2023-02-13 DIAGNOSIS — F32.5 MAJOR DEPRESSIVE DISORDER WITH SINGLE EPISODE, IN FULL REMISSION (HCC): ICD-10-CM

## 2023-02-13 DIAGNOSIS — I77.810 DILATED AORTIC ROOT (HCC): ICD-10-CM

## 2023-02-13 DIAGNOSIS — E11.29 TYPE 2 DIABETES MELLITUS WITH MICROALBUMINURIA, WITHOUT LONG-TERM CURRENT USE OF INSULIN (HCC): ICD-10-CM

## 2023-02-13 PROCEDURE — 99214 OFFICE O/P EST MOD 30 MIN: CPT | Performed by: INTERNAL MEDICINE

## 2023-02-13 RX ORDER — CLOTRIMAZOLE 1 %
1 CREAM (GRAM) TOPICAL 2 TIMES DAILY
Qty: 60 G | Refills: 1 | Status: SHIPPED | OUTPATIENT
Start: 2023-02-13 | End: 2023-02-27

## 2023-02-13 RX ORDER — GABAPENTIN 300 MG/1
600 CAPSULE ORAL 3 TIMES DAILY
Qty: 600 CAPSULE | Refills: 3 | Status: SHIPPED | OUTPATIENT
Start: 2023-02-13

## 2023-02-13 RX ORDER — NIFEDIPINE 90 MG/1
TABLET, FILM COATED, EXTENDED RELEASE ORAL
COMMUNITY
End: 2023-05-25

## 2023-02-13 ASSESSMENT — PATIENT HEALTH QUESTIONNAIRE - PHQ9
SUM OF ALL RESPONSES TO PHQ9 QUESTIONS 1 AND 2: 0
7. TROUBLE CONCENTRATING ON THINGS, SUCH AS READING THE NEWSPAPER OR WATCHING TELEVISION: NOT AT ALL
4. FEELING TIRED OR HAVING LITTLE ENERGY: NOT AT ALL
6. FEELING BAD ABOUT YOURSELF - OR THAT YOU ARE A FAILURE OR HAVE LET YOURSELF OR YOUR FAMILY DOWN: NOT AL ALL
2. FEELING DOWN, DEPRESSED, IRRITABLE, OR HOPELESS: NOT AT ALL
3. TROUBLE FALLING OR STAYING ASLEEP OR SLEEPING TOO MUCH: NOT AT ALL
9. THOUGHTS THAT YOU WOULD BE BETTER OFF DEAD, OR OF HURTING YOURSELF: NOT AT ALL
8. MOVING OR SPEAKING SO SLOWLY THAT OTHER PEOPLE COULD HAVE NOTICED. OR THE OPPOSITE, BEING SO FIGETY OR RESTLESS THAT YOU HAVE BEEN MOVING AROUND A LOT MORE THAN USUAL: NOT AT ALL
5. POOR APPETITE OR OVEREATING: NOT AT ALL
SUM OF ALL RESPONSES TO PHQ QUESTIONS 1-9: 0
1. LITTLE INTEREST OR PLEASURE IN DOING THINGS: NOT AT ALL

## 2023-02-13 ASSESSMENT — FIBROSIS 4 INDEX: FIB4 SCORE: 1.7

## 2023-02-14 NOTE — ASSESSMENT & PLAN NOTE
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  Want to give her is good quality of life for as long as possible and recognizes part of that is making sure he takes care of himself.  Continue escitalopram 20 mg daily.

## 2023-02-14 NOTE — ASSESSMENT & PLAN NOTE
Chronic ongoing problem.  Although his A1c improved when he went back on metformin and pioglitazone he is having severe diarrhea with associated incontinence due to the GI distress.  We will reduce metformin to 500 mg twice daily and start him on Trulicity 0.75 mg weekly and continue pioglitazone 30 mg daily.  We will have the [facility] pharmacist see if he would qualify for any patient assistance programs.  Advised him to update me if his pharmacy does not have the Trulicity available.  We will plan to recheck in about 3 months with a goal of titrating up the Trulicity to be able to get him off the metformin due to the debilitating diarrhea side effects.

## 2023-02-14 NOTE — ASSESSMENT & PLAN NOTE
Chronic decompensated problem.  Discussed trial of Trulicity as metformin is causing severe diarrhea, initiate dulaglutide 0.75 mg weekly we will plan to titrate up each month as tolerated.  He will watch out for nausea, vomiting, upset stomach, diarrhea, constipation, or other new side effects.  He will plan to inject on Sundays.  Have him return in 3 months or sooner, hopefully this can help kick start his weight loss so he can get more active with his lifestyle, certainly makes his back pain worse as well as his diabetes when his weight is up.

## 2023-02-14 NOTE — PROGRESS NOTES
Subjective:   Chief Complaint/History of Present Illness:  Milton Javed Jr. is a 77 y.o. male established patient who presents today to discuss medical problems as listed below. William is unaccompanied for today's visit.    Problem   Seborrheic Dermatitis    He has flaking skin with erythematous base around eyebrows and ears, he thought this was due to dry skin. He is open to trying a topical antifungal for 2 weeks to see if that would help symptoms as they are concerning for seborrheic dermatitis.     Class 3 Severe Obesity With Serious Comorbidity and Body Mass Index (Bmi) of 40.0 to 44.9 in Adult ([facility])    He has BMI of 44.01 with a weight of 280 pounds.  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  He is taking metformin but this causes severe diarrhea. He is interested in trying a GLP1 agonist therapy due to his intolerance to metformin and history of diabetes.     Dilated Aortic Root ([facility])    CTA Chest (2/2020): Ascending thoracic aorta is top normal in size at 4 cm in diameter.  No dissection.    Incidental finding on CT imaging in 2018, 4.1 cm ascending aortic aneurysm.  Follow-up imaging has showed stability, he follows with cardiology.     Major Depressive Disorder With Single Episode, in Full Remission ([facility])    Depression Screening (2/13/2023)    Little interest or pleasure in doing things?   Not at all  Feeling down, depressed , or hopeless?  Not at all  Trouble falling or staying asleep, or sleeping too much?   Not at all  Feeling tired or having little energy?   Not at all  Poor appetite or overeating?   Not at all  Feeling bad about yourself - or that you are a failure or have let yourself or your family down?  Not al all  Trouble concentrating on things, such as reading the newspaper or watching television?  Not at all  Moving or speaking so slowly that other people could have  noticed.  Or the opposite - being so fidgety or restless that you have been moving around a lot more than usual?   Not at all  Thoughts that you would be better off dead, or of hurting yourself?   Not at all  Patient Health Questionnaire Score:  0      He reports labile mood with depression starting in June 2019, he was initiated on SSRI therapy at that time with 1 dose increase with resolution of the depression.  No known side effects from therapy, he would like to continue at this time.  Previously was taking what sounds to be a benzodiazepine which made him quite groggy.    Current regimen: escitalopram 20 mg daily     Type 2 diabetes mellitus with microalbuminuria, without long-term current use of insulin (HCC)     Latest Reference Range & Units 11/09/22 17:00 12/21/22 09:50   Glycohemoglobin 0.0 - 5.6 % 9.5 ! 7.2 !      Latest Reference Range & Units 06/30/22 15:55 11/09/22 16:57   Micro Alb Creat Ratio 0 - 30 mg/g 127 (H) 78 (H)     Diagnosed in August 2020, treated with metformin, recent deterioration in Summer/Fall 2022 due to his wife's health decline and dietary indiscretion and decreased activity. Metformin was reduced in December 2021 from 1000 mg BID to 500 mg BID which may also be contributing. Now with +microalbuminuria. No known neuropathy or retinopathy. He is on statin, aspirin, and now back on ARB.    Current regimen: Trulicity 0.75 mg weekly, metformin  mg BID and pioglitazone 30 mg daily          Current Medications:  Current Outpatient Medications Ordered in Epic   Medication Sig Dispense Refill    gabapentin (NEURONTIN) 300 MG Cap Take 2 Capsules by mouth 3 times a day. 600 Capsule 3    Dulaglutide 0.75 MG/0.5ML Solution Pen-injector Inject 0.5 mL under the skin every 7 days. 4.5 mL 3    clotrimazole (LOTRIMIN) 1 % Cream Apply 1 Application topically 2 times a day for 14 days. 60 g 1    NIFEdipine (ADALAT CC) 90 MG CR tablet nifedipine ER 90 mg tablet,extended release   TAKE 1 TABLET BY  MOUTH EVERY DAY      losartan (COZAAR) 50 MG Tab Take 1 Tablet by mouth every day. 100 Tablet 3    levothyroxine (SYNTHROID) 75 MCG Tab Take 1 Tablet by mouth every morning on an empty stomach. 100 Tablet 3    metoprolol SR (TOPROL XL) 25 MG TABLET SR 24 HR Take 1 Tablet by mouth every day. 100 Tablet 3    ezetimibe (ZETIA) 10 MG Tab Take 1 Tablet by mouth every day. 100 Tablet 3    pioglitazone (ACTOS) 30 MG Tab Take 1 Tablet by mouth every day. 100 Tablet 3    rosuvastatin (CRESTOR) 20 MG Tab Take 1 Tablet by mouth every evening. 100 Tablet 3    Nystatin Powder 1 Application 4 times a day as needed (skin fold irritation). 1 Each 3    escitalopram (LEXAPRO) 20 MG tablet Take 1 Tablet by mouth every day. 100 Tablet 3    metFORMIN ER (GLUCOPHAGE XR) 500 MG TABLET SR 24 HR Take 2 Tablets by mouth 2 times a day. 400 Tablet 3    Blood Glucose Monitoring Suppl Device Meter: Dispense insurance preffered meter. Sig. Use as directed for blood sugar monitoring. 1 Each 0    Blood Glucose Test Strips Test strips order: Test strips for insurance approved meter. Sig: use BID and prn ssx high or low sugar, max 4x/d 100 Strip 3    Lancets Lancets order: Lancets for insurance preffered meter. Sig: use BID and prn ssx high or low sugar Max 4x/day 100 Each 3    oxyCODONE immediate-release (ROXICODONE) 5 MG Tab Take 1 Tablet by mouth every four hours as needed for Severe Pain.      aspirin (ASA) 81 MG Chew Tab chewable tablet Chew 1 Tablet every day.      B Complex Vitamins (VITAMIN B COMPLEX PO) Take  by mouth.      Blood Glucose Monitoring Suppl SUPPLIES Misc Test strips order: Test strips for [employer] Contour Next meter. Sig: use daily for medication adjustment.  Dx. Code E11.65 100 Strip 3    Cholecalciferol (VITAMIN D3 PO) Take  by mouth.      Fish Oil Oil by Does not apply route.      Ascorbic Acid (VITAMIN C) 1000 MG Tab Take  by mouth.      albuterol 108 (90 BASE) MCG/ACT Aero Soln inhalation aerosol Inhale 2 Puffs by mouth every  "6 hours as needed for Shortness of Breath. 8.5 g 3     No current [facility]-ordered facility-administered medications on file.          Objective:   Physical Exam:    Vitals: /58 (BP Location: Left arm, Patient Position: Sitting, BP Cuff Size: Adult)   Pulse 63   Temp 36.3 °C (97.4 °F)   Resp 18   Ht 1.702 m (5' 7\")   Wt (!) 127 kg (281 lb)   SpO2 95%    BMI: Body mass index is 44.01 kg/m².  Physical Exam  Constitutional:       General: He is not in acute distress.     Appearance: Normal appearance. He is not ill-appearing.   HENT:      Right Ear: There is no impacted cerumen.      Left Ear: There is no impacted cerumen.      Ears:      Comments: Flaking of external ears especially at posterior aspect  Eyes:      Conjunctiva/sclera: Conjunctivae normal.   Cardiovascular:      Rate and Rhythm: Normal rate and regular rhythm.      Pulses: Normal pulses.   Pulmonary:      Effort: Pulmonary effort is normal. No respiratory distress.      Breath sounds: No rhonchi.   Abdominal:      General: Bowel sounds are normal. There is no distension.      Palpations: Abdomen is soft.   Musculoskeletal:      Right lower leg: Edema present.      Left lower leg: Edema present.   Skin:     General: Skin is dry.      Findings: Erythema and rash present.   Psychiatric:         Mood and Affect: Mood normal.         Behavior: Behavior normal.         Thought Content: Thought content normal.         Judgment: Judgment normal.             Assessment and Plan:   Milton is a 77 y.o. male with the following:  Problem List Items Addressed This Visit       Type 2 diabetes mellitus with microalbuminuria, without long-term current use of insulin (HCC)     Chronic ongoing problem.  Although his A1c improved when he went back on metformin and pioglitazone he is having severe diarrhea with associated incontinence due to the GI distress.  We will reduce metformin to 500 mg twice daily and start him on Trulicity 0.75 mg weekly and continue " pioglitazone 30 mg daily.  We will have the [facility] pharmacist see if he would qualify for any patient assistance programs.  Advised him to update me if his pharmacy does not have the Trulicity available.  We will plan to recheck in about 3 months with a goal of titrating up the Trulicity to be able to get him off the metformin due to the debilitating diarrhea side effects.         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  Want to give her is good quality of life for as long as possible and recognizes part of that is making sure he takes care of himself.  Continue escitalopram 20 mg daily.         Dilated aortic root (HCC)     Chronic ongoing problem, continue follow-up with cardiology as recommended.         Relevant Medications    NIFEdipine (ADALAT CC) 90 MG CR tablet    Class 3 severe obesity with serious comorbidity and body mass index (BMI) of 40.0 to 44.9 in adult (HCC)     Chronic decompensated problem.  Discussed trial of Trulicity as metformin is causing severe diarrhea, initiate dulaglutide 0.75 mg weekly we will plan to titrate up each month as tolerated.  He will watch out for nausea, vomiting, upset stomach, diarrhea, constipation, or other new side effects.  He will plan to inject on Sundays.  Have him return in 3 months or sooner, hopefully this can help kick start his weight loss so he can get more active with his lifestyle, certainly makes his back pain worse as well as his diabetes when his weight is up.         Relevant Medications     Dulaglutide 0.75 MG/0.5ML Solution Pen-injector    Seborrheic dermatitis     New decompensated problem, exam appears consistent with seborrheic dermatitis, recommend he try topical clotrimazole twice daily for the next 2 weeks and update me.  Could also consider topical steroid if this is not effective.         Relevant Medications    clotrimazole (LOTRIMIN) 1 % Cream          RTC: Return in about 3 months (around 5/13/2023).    I spent a total of 38 minutes with record review, exam, communication with the patient, communication with other providers, and documentation of this encounter.    PLEASE NOTE: This dictation was created using voice recognition software. I have made every reasonable attempt to correct obvious errors, but I expect that there are errors of grammar and possibly content that I did not discover before finalizing the note.      Agnes Genao, DO  Geriatric and Internal Medicine  [facility]

## 2023-02-14 NOTE — ASSESSMENT & PLAN NOTE
New decompensated problem, exam appears consistent with seborrheic dermatitis, recommend he try topical clotrimazole twice daily for the next 2 weeks and update me.  Could also consider topical steroid if this is not effective.

## 2023-02-20 DIAGNOSIS — L30.4 INTERTRIGO: ICD-10-CM

## 2023-02-21 RX ORDER — NYSTATIN 10B UNIT
1 POWDER (EA) MISCELLANEOUS 4 TIMES DAILY PRN
Qty: 1 EACH | Refills: 0 | Status: SHIPPED | OUTPATIENT
Start: 2023-02-21

## 2023-02-21 NOTE — TELEPHONE ENCOUNTER
Requested Prescriptions     Pending Prescriptions Disp Refills   • Nystatin Powder 1 Each 0     Si Application 4 times a day as needed (skin fold irritation).     Opal Estrada M.D.

## 2023-04-04 DIAGNOSIS — L98.9 SKIN LESION: ICD-10-CM

## 2023-04-17 ENCOUNTER — DOCUMENTATION (OUTPATIENT)
Dept: HEALTH INFORMATION MANAGEMENT | Facility: OTHER | Age: 78
End: 2023-04-17
Payer: MEDICARE

## 2023-05-16 DIAGNOSIS — Z12.5 ENCOUNTER FOR SCREENING FOR MALIGNANT NEOPLASM OF PROSTATE: ICD-10-CM

## 2023-05-16 PROCEDURE — 1126F AMNT PAIN NOTED NONE PRSNT: CPT | Performed by: INTERNAL MEDICINE

## 2023-05-18 ENCOUNTER — APPOINTMENT (OUTPATIENT)
Dept: MEDICAL GROUP | Facility: PHYSICIAN GROUP | Age: 78
End: 2023-05-18
Payer: MEDICARE

## 2023-05-22 ENCOUNTER — HOSPITAL ENCOUNTER (OUTPATIENT)
Dept: LAB | Facility: MEDICAL CENTER | Age: 78
End: 2023-05-22
Attending: INTERNAL MEDICINE
Payer: MEDICARE

## 2023-05-22 DIAGNOSIS — R80.9 TYPE 2 DIABETES MELLITUS WITH MICROALBUMINURIA, WITHOUT LONG-TERM CURRENT USE OF INSULIN (HCC): ICD-10-CM

## 2023-05-22 DIAGNOSIS — E11.29 TYPE 2 DIABETES MELLITUS WITH MICROALBUMINURIA, WITHOUT LONG-TERM CURRENT USE OF INSULIN (HCC): ICD-10-CM

## 2023-05-22 LAB
25(OH)D3 SERPL-MCNC: 56 NG/ML (ref 30–100)
ALBUMIN SERPL BCP-MCNC: 4.2 G/DL (ref 3.2–4.9)
ALBUMIN/GLOB SERPL: 1.8 G/DL
ALP SERPL-CCNC: 51 U/L (ref 30–99)
ALT SERPL-CCNC: 23 U/L (ref 2–50)
ANION GAP SERPL CALC-SCNC: 11 MMOL/L (ref 7–16)
AST SERPL-CCNC: 21 U/L (ref 12–45)
BASOPHILS # BLD AUTO: 0.4 % (ref 0–1.8)
BASOPHILS # BLD: 0.02 K/UL (ref 0–0.12)
BILIRUB SERPL-MCNC: 0.5 MG/DL (ref 0.1–1.5)
BUN SERPL-MCNC: 24 MG/DL (ref 8–22)
CALCIUM ALBUM COR SERPL-MCNC: 8.7 MG/DL (ref 8.5–10.5)
CALCIUM SERPL-MCNC: 8.9 MG/DL (ref 8.5–10.5)
CHLORIDE SERPL-SCNC: 110 MMOL/L (ref 96–112)
CHOLEST SERPL-MCNC: 127 MG/DL (ref 100–199)
CO2 SERPL-SCNC: 17 MMOL/L (ref 20–33)
CREAT SERPL-MCNC: 1.07 MG/DL (ref 0.5–1.4)
EOSINOPHIL # BLD AUTO: 0.18 K/UL (ref 0–0.51)
EOSINOPHIL NFR BLD: 4 % (ref 0–6.9)
ERYTHROCYTE [DISTWIDTH] IN BLOOD BY AUTOMATED COUNT: 47.1 FL (ref 35.9–50)
GFR SERPLBLD CREATININE-BSD FMLA CKD-EPI: 72 ML/MIN/1.73 M 2
GLOBULIN SER CALC-MCNC: 2.4 G/DL (ref 1.9–3.5)
GLUCOSE SERPL-MCNC: 106 MG/DL (ref 65–99)
HCT VFR BLD AUTO: 38.9 % (ref 42–52)
HDLC SERPL-MCNC: 40 MG/DL
HGB BLD-MCNC: 13.1 G/DL (ref 14–18)
IMM GRANULOCYTES # BLD AUTO: 0.01 K/UL (ref 0–0.11)
IMM GRANULOCYTES NFR BLD AUTO: 0.2 % (ref 0–0.9)
LDLC SERPL CALC-MCNC: 65 MG/DL
LYMPHOCYTES # BLD AUTO: 1.1 K/UL (ref 1–4.8)
LYMPHOCYTES NFR BLD: 24.5 % (ref 22–41)
MCH RBC QN AUTO: 32.8 PG (ref 27–33)
MCHC RBC AUTO-ENTMCNC: 33.7 G/DL (ref 32.3–36.5)
MCV RBC AUTO: 97.5 FL (ref 81.4–97.8)
MONOCYTES # BLD AUTO: 0.42 K/UL (ref 0–0.85)
MONOCYTES NFR BLD AUTO: 9.4 % (ref 0–13.4)
NEUTROPHILS # BLD AUTO: 2.76 K/UL (ref 1.82–7.42)
NEUTROPHILS NFR BLD: 61.5 % (ref 44–72)
NRBC # BLD AUTO: 0 K/UL
NRBC BLD-RTO: 0 /100 WBC (ref 0–0.2)
PLATELET # BLD AUTO: 175 K/UL (ref 164–446)
PMV BLD AUTO: 9.7 FL (ref 9–12.9)
POTASSIUM SERPL-SCNC: 4.6 MMOL/L (ref 3.6–5.5)
PROT SERPL-MCNC: 6.6 G/DL (ref 6–8.2)
RBC # BLD AUTO: 3.99 M/UL (ref 4.7–6.1)
SODIUM SERPL-SCNC: 138 MMOL/L (ref 135–145)
TRIGL SERPL-MCNC: 112 MG/DL (ref 0–149)
VIT B12 SERPL-MCNC: 418 PG/ML (ref 211–911)
WBC # BLD AUTO: 4.5 K/UL (ref 4.8–10.8)

## 2023-05-22 PROCEDURE — 80053 COMPREHEN METABOLIC PANEL: CPT

## 2023-05-22 PROCEDURE — 82570 ASSAY OF URINE CREATININE: CPT

## 2023-05-22 PROCEDURE — 82306 VITAMIN D 25 HYDROXY: CPT

## 2023-05-22 PROCEDURE — 85025 COMPLETE CBC W/AUTO DIFF WBC: CPT

## 2023-05-22 PROCEDURE — 82607 VITAMIN B-12: CPT

## 2023-05-22 PROCEDURE — 80061 LIPID PANEL: CPT

## 2023-05-22 PROCEDURE — 82043 UR ALBUMIN QUANTITATIVE: CPT

## 2023-05-22 PROCEDURE — 36415 COLL VENOUS BLD VENIPUNCTURE: CPT

## 2023-05-23 LAB
CREAT UR-MCNC: 96.29 MG/DL
MICROALBUMIN UR-MCNC: 1.9 MG/DL
MICROALBUMIN/CREAT UR: 20 MG/G (ref 0–30)

## 2023-05-25 ENCOUNTER — OFFICE VISIT (OUTPATIENT)
Dept: MEDICAL GROUP | Facility: PHYSICIAN GROUP | Age: 78
End: 2023-05-25
Payer: MEDICARE

## 2023-05-25 VITALS
OXYGEN SATURATION: 97 % | HEART RATE: 61 BPM | SYSTOLIC BLOOD PRESSURE: 120 MMHG | BODY MASS INDEX: 43.63 KG/M2 | RESPIRATION RATE: 16 BRPM | TEMPERATURE: 96.5 F | WEIGHT: 278 LBS | DIASTOLIC BLOOD PRESSURE: 62 MMHG | HEIGHT: 67 IN

## 2023-05-25 DIAGNOSIS — F32.5 MAJOR DEPRESSIVE DISORDER WITH SINGLE EPISODE, IN FULL REMISSION (HCC): ICD-10-CM

## 2023-05-25 DIAGNOSIS — E11.29 TYPE 2 DIABETES MELLITUS WITH MICROALBUMINURIA, WITHOUT LONG-TERM CURRENT USE OF INSULIN (HCC): ICD-10-CM

## 2023-05-25 DIAGNOSIS — E78.2 MIXED HYPERLIPIDEMIA: ICD-10-CM

## 2023-05-25 DIAGNOSIS — E66.01 CLASS 3 SEVERE OBESITY DUE TO EXCESS CALORIES WITH SERIOUS COMORBIDITY AND BODY MASS INDEX (BMI) OF 40.0 TO 44.9 IN ADULT (HCC): ICD-10-CM

## 2023-05-25 DIAGNOSIS — R80.9 TYPE 2 DIABETES MELLITUS WITH MICROALBUMINURIA, WITHOUT LONG-TERM CURRENT USE OF INSULIN (HCC): ICD-10-CM

## 2023-05-25 PROBLEM — R53.83 CAREGIVER WITH FATIGUE: Status: RESOLVED | Noted: 2017-04-13 | Resolved: 2023-05-25

## 2023-05-25 LAB
HBA1C MFR BLD: 5.8 % (ref ?–5.8)
POCT INT CON NEG: NEGATIVE
POCT INT CON POS: POSITIVE

## 2023-05-25 PROCEDURE — 3074F SYST BP LT 130 MM HG: CPT | Performed by: INTERNAL MEDICINE

## 2023-05-25 PROCEDURE — 83036 HEMOGLOBIN GLYCOSYLATED A1C: CPT | Performed by: INTERNAL MEDICINE

## 2023-05-25 PROCEDURE — 99214 OFFICE O/P EST MOD 30 MIN: CPT | Performed by: INTERNAL MEDICINE

## 2023-05-25 PROCEDURE — 3078F DIAST BP <80 MM HG: CPT | Performed by: INTERNAL MEDICINE

## 2023-05-25 ASSESSMENT — FIBROSIS 4 INDEX: FIB4 SCORE: 1.9

## 2023-05-25 NOTE — ASSESSMENT & PLAN NOTE
Chronic improved problem, cholesterol doing well on current regimen, continue rosuvastatin 20 mg daily and Zetia 10 mg daily.  Also provided food pantry prescription for assistance with heart healthy and diabetic food.

## 2023-05-25 NOTE — PROGRESS NOTES
Subjective:   Chief Complaint/History of Present Illness:  Milton Javed Jr. is a 78 y.o. male established patient who presents today to discuss medical problems as listed below. William is unaccompanied for today's visit.    Problem   Class 3 Severe Obesity With Serious Comorbidity and Body Mass Index (Bmi) of 40.0 to 44.9 in Adult ([facility])    He has BMI of 44.01 with a weight of 280 pounds.  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  He is taking metformin but this causes severe diarrhea. He is interested in trying a GLP1 agonist therapy due to his intolerance to metformin and history of diabetes.    He Lost a few pounds after starting Trulicity but has not been able to afford it the past few weeks so we will look into patient assistance.       Major Depressive Disorder With Single Episode, in Full Remission ([facility])    He reports labile mood with depression starting in June 2019, he was initiated on SSRI therapy at that time with 1 dose increase with resolution of the depression.  No known side effects from therapy, he would like to continue at this time.  Previously was taking what sounds to be a benzodiazepine which made him quite groggy.    Current regimen: escitalopram 20 mg daily       Type 2 diabetes mellitus with microalbuminuria, without long-term current use of insulin ([facility])     Latest Reference Range & Units 11/09/22 17:00 12/21/22 09:50 05/25/23 07:22   Glycohemoglobin 5.8 % 9.5 ! 7.2 ! 5.8      Latest Reference Range & Units 06/30/22 15:55 11/09/22 16:57 05/22/23 09:32   Micro Alb Creat Ratio 0 - 30 mg/g 127 (H) 78 (H) 20     Diagnosed in August 2020, treated with metformin, recent deterioration in Summer/Fall 2022 due to his wife's health decline and dietary indiscretion and decreased activity. He developed transient microalbuminuria which has since resolved with losartan and  better control of diabetes. No known neuropathy or retinopathy.    Current regimen: Trulicity 0.75 mg weekly, metformin ER 1000 mg BID and pioglitazone 30 mg daily     Mixed Hyperlipidemia     Latest Reference Range & Units 05/22/23 09:31   Cholesterol,Tot 100 - 199 mg/dL 127   Triglycerides 0 - 149 mg/dL 112   HDL >=40 mg/dL 40   LDL <100 mg/dL 65     He has longstanding history of elevated cholesterol, start on statin therapy with resultant improvement.    Current regimen: rosuvastatin 20 mg daily and Zetia 10 mg daily          Current Medications:  Current Outpatient Medications Ordered in Epic   Medication Sig Dispense Refill    Nystatin Powder 1 Application 4 times a day as needed (skin fold irritation). 1 Each 0    gabapentin (NEURONTIN) 300 MG Cap Take 2 Capsules by mouth 3 times a day. 600 Capsule 3    Dulaglutide 0.75 MG/0.5ML Solution Pen-injector Inject 0.5 mL under the skin every 7 days. 4.5 mL 3    losartan (COZAAR) 50 MG Tab Take 1 Tablet by mouth every day. 100 Tablet 3    levothyroxine (SYNTHROID) 75 MCG Tab Take 1 Tablet by mouth every morning on an empty stomach. 100 Tablet 3    metoprolol SR (TOPROL XL) 25 MG TABLET SR 24 HR Take 1 Tablet by mouth every day. 100 Tablet 3    ezetimibe (ZETIA) 10 MG Tab Take 1 Tablet by mouth every day. 100 Tablet 3    pioglitazone (ACTOS) 30 MG Tab Take 1 Tablet by mouth every day. 100 Tablet 3    rosuvastatin (CRESTOR) 20 MG Tab Take 1 Tablet by mouth every evening. 100 Tablet 3    escitalopram (LEXAPRO) 20 MG tablet Take 1 Tablet by mouth every day. 100 Tablet 3    metFORMIN ER (GLUCOPHAGE XR) 500 MG TABLET SR 24 HR Take 2 Tablets by mouth 2 times a day. 400 Tablet 3    Blood Glucose Monitoring Suppl Device Meter: Dispense insurance preffered meter. Sig. Use as directed for blood sugar monitoring. 1 Each 0    Blood Glucose Test Strips Test strips order: Test strips for insurance approved meter. Sig: use BID and prn ssx high or low sugar, max 4x/d 100 Strip 3     "Lancets Lancets order: Lancets for insurance preffered meter. Sig: use BID and prn ssx high or low sugar Max 4x/day 100 Each 3    oxyCODONE immediate-release (ROXICODONE) 5 MG Tab Take 1 Tablet by mouth every four hours as needed for Severe Pain.      aspirin (ASA) 81 MG Chew Tab chewable tablet Chew 1 Tablet every day.      B Complex Vitamins (VITAMIN B COMPLEX PO) Take  by mouth.      Blood Glucose Monitoring Suppl SUPPLIES Misc Test strips order: Test strips for Live[employer] Contour Next meter. Sig: use daily for medication adjustment.  Dx. Code E11.65 100 Strip 3    Cholecalciferol (VITAMIN D3 PO) Take  by mouth.      Fish Oil Oil by Does not apply route.      Ascorbic Acid (VITAMIN C) 1000 MG Tab Take  by mouth.      albuterol 108 (90 BASE) MCG/ACT Aero Soln inhalation aerosol Inhale 2 Puffs by mouth every 6 hours as needed for Shortness of Breath. 8.5 g 3     No current [facility]-ordered facility-administered medications on file.          Objective:   Physical Exam:    Vitals: /62 (BP Location: Right arm, Patient Position: Sitting, BP Cuff Size: Adult)   Pulse 61   Temp 35.8 °C (96.5 °F) (Temporal)   Resp 16   Ht 1.702 m (5' 7\")   Wt (!) 126 kg (278 lb)   SpO2 97%    BMI: Body mass index is 43.54 kg/m².  Physical Exam  Constitutional:       General: He is not in acute distress.     Appearance: Normal appearance. He is not ill-appearing.   Eyes:      General: No scleral icterus.     Conjunctiva/sclera: Conjunctivae normal.   Cardiovascular:      Rate and Rhythm: Normal rate and regular rhythm.      Pulses: Normal pulses.   Pulmonary:      Effort: Pulmonary effort is normal. No respiratory distress.      Breath sounds: No wheezing or rhonchi.   Musculoskeletal:      Right lower leg: No edema.      Left lower leg: No edema.   Skin:     General: Skin is warm and dry.      Findings: No rash.   Psychiatric:         Mood and Affect: Mood normal.         Behavior: Behavior normal.         Thought Content: Thought " content normal.         Judgment: Judgment normal.          Assessment and Plan:   Milton is a 78 y.o. male with the following:  Problem List Items Addressed This Visit       Class 3 severe obesity with serious comorbidity and body mass index (BMI) of 40.0 to 44.9 in adult ([facility])     Chronic ongoing problem, he is down a few pounds but would benefit from a dose increase in Trulicity however not currently affordable.  We will see if we can get him qualified for patient assistance.           Major depressive disorder with single episode, in full remission ([facility])     Chronic ongoing problem, he has since lost his wife to metastatic breast cancer but is in good spirits and adjusting to no longer being a primary caregiver.  Medical therapy is helpful, continue escitalopram 20 mg daily.           Mixed hyperlipidemia     Chronic improved problem, cholesterol doing well on current regimen, continue rosuvastatin 20 mg daily and Zetia 10 mg daily.  Also provided food pantry prescription for assistance with heart healthy and diabetic food.           Type 2 diabetes mellitus with microalbuminuria, without long-term current use of insulin ([facility])     Chronic improved problem, A1c has gone down significantly and microalbuminuria has resolved.  No noted side effects with Trulicity however he reports he cannot afford it and is almost 3 weeks out of it waiting for his next Social Security check.  Metformin gives him diarrhea so he continues on it but we would like to be able to stop this if we can get the Trulicity set up for him in a more reliable fashion.  Continue pioglitazone unchanged but hopefully be able to stop this 1 as well in the future if we can optimize Trulicity.  Continue losartan which has resolved his microalbuminuria as well as statin and aspirin.  Foot exam and retinal screening are up-to-date.           Relevant Orders    POCT  A1C (Completed)        RTC: Return in about 3 months (around 8/25/2023).    I spent a  total of 32 minutes with record review, exam, communication with the patient, communication with other providers, and documentation of this encounter.    PLEASE NOTE: This dictation was created using voice recognition software. I have made every reasonable attempt to correct obvious errors, but I expect that there are errors of grammar and possibly content that I did not discover before finalizing the note.      Agnes Genao, DO  Geriatric and Internal Medicine  [facility]

## 2023-05-25 NOTE — ASSESSMENT & PLAN NOTE
Chronic improved problem, A1c has gone down significantly and microalbuminuria has resolved.  No noted side effects with Trulicity however he reports he cannot afford it and is almost 3 weeks out of it waiting for his next Social Security check.  Metformin gives him diarrhea so he continues on it but we would like to be able to stop this if we can get the Trulicity set up for him in a more reliable fashion.  Continue pioglitazone unchanged but hopefully be able to stop this 1 as well in the future if we can optimize Trulicity.  Continue losartan which has resolved his microalbuminuria as well as statin and aspirin.  Foot exam and retinal screening are up-to-date.

## 2023-05-25 NOTE — ASSESSMENT & PLAN NOTE
Chronic ongoing problem, he is down a few pounds but would benefit from a dose increase in Trulicity however not currently affordable.  We will see if we can get him qualified for patient assistance.

## 2023-05-25 NOTE — ASSESSMENT & PLAN NOTE
Chronic ongoing problem, he has since lost his wife to metastatic breast cancer but is in good spirits and adjusting to no longer being a primary caregiver.  Medical therapy is helpful, continue escitalopram 20 mg daily.

## 2023-05-26 ENCOUNTER — DOCUMENTATION (OUTPATIENT)
Dept: HEALTH INFORMATION MANAGEMENT | Facility: OTHER | Age: 78
End: 2023-05-26
Payer: MEDICARE

## 2023-07-09 DIAGNOSIS — E11.65 TYPE 2 DIABETES MELLITUS WITH HYPERGLYCEMIA, WITHOUT LONG-TERM CURRENT USE OF INSULIN (HCC): ICD-10-CM

## 2023-07-10 RX ORDER — METFORMIN HYDROCHLORIDE 500 MG/1
TABLET, EXTENDED RELEASE ORAL
Qty: 400 TABLET | Refills: 0 | Status: SHIPPED | OUTPATIENT
Start: 2023-07-10 | End: 2024-01-29

## 2023-07-10 NOTE — TELEPHONE ENCOUNTER
Requested Prescriptions     Pending Prescriptions Disp Refills   • metFORMIN ER (GLUCOPHAGE XR) 500 MG TABLET SR 24 HR [Pharmacy Med Name: METFORMIN ER 500MG 24HR TABS] 400 Tablet 0     Sig: TAKE 2 TABLETS BY MOUTH TWICE DAILY   Opal Estrada M.D.

## 2023-08-15 DIAGNOSIS — R04.0 EPISTAXIS: ICD-10-CM

## 2023-08-18 ENCOUNTER — HOSPITAL ENCOUNTER (OUTPATIENT)
Dept: LAB | Facility: MEDICAL CENTER | Age: 78
End: 2023-08-18
Attending: INTERNAL MEDICINE
Payer: MEDICARE

## 2023-08-18 DIAGNOSIS — R80.9 TYPE 2 DIABETES MELLITUS WITH MICROALBUMINURIA, WITHOUT LONG-TERM CURRENT USE OF INSULIN (HCC): ICD-10-CM

## 2023-08-18 DIAGNOSIS — E78.2 MIXED HYPERLIPIDEMIA: ICD-10-CM

## 2023-08-18 DIAGNOSIS — E11.29 TYPE 2 DIABETES MELLITUS WITH MICROALBUMINURIA, WITHOUT LONG-TERM CURRENT USE OF INSULIN (HCC): ICD-10-CM

## 2023-08-18 LAB
ALBUMIN SERPL BCP-MCNC: 4.3 G/DL (ref 3.2–4.9)
ALBUMIN/GLOB SERPL: 2 G/DL
ALP SERPL-CCNC: 50 U/L (ref 30–99)
ALT SERPL-CCNC: 19 U/L (ref 2–50)
ANION GAP SERPL CALC-SCNC: 10 MMOL/L (ref 7–16)
AST SERPL-CCNC: 20 U/L (ref 12–45)
BASOPHILS # BLD AUTO: 0.6 % (ref 0–1.8)
BASOPHILS # BLD: 0.03 K/UL (ref 0–0.12)
BILIRUB SERPL-MCNC: 0.4 MG/DL (ref 0.1–1.5)
BUN SERPL-MCNC: 30 MG/DL (ref 8–22)
CALCIUM ALBUM COR SERPL-MCNC: 8.6 MG/DL (ref 8.5–10.5)
CALCIUM SERPL-MCNC: 8.8 MG/DL (ref 8.5–10.5)
CHLORIDE SERPL-SCNC: 108 MMOL/L (ref 96–112)
CHOLEST SERPL-MCNC: 115 MG/DL (ref 100–199)
CO2 SERPL-SCNC: 18 MMOL/L (ref 20–33)
CREAT SERPL-MCNC: 1.36 MG/DL (ref 0.5–1.4)
EOSINOPHIL # BLD AUTO: 0.36 K/UL (ref 0–0.51)
EOSINOPHIL NFR BLD: 7.2 % (ref 0–6.9)
ERYTHROCYTE [DISTWIDTH] IN BLOOD BY AUTOMATED COUNT: 46.8 FL (ref 35.9–50)
GFR SERPLBLD CREATININE-BSD FMLA CKD-EPI: 54 ML/MIN/1.73 M 2
GLOBULIN SER CALC-MCNC: 2.2 G/DL (ref 1.9–3.5)
GLUCOSE SERPL-MCNC: 117 MG/DL (ref 65–99)
HCT VFR BLD AUTO: 37.2 % (ref 42–52)
HDLC SERPL-MCNC: 37 MG/DL
HGB BLD-MCNC: 12.7 G/DL (ref 14–18)
IMM GRANULOCYTES # BLD AUTO: 0.01 K/UL (ref 0–0.11)
IMM GRANULOCYTES NFR BLD AUTO: 0.2 % (ref 0–0.9)
LDLC SERPL CALC-MCNC: 57 MG/DL
LYMPHOCYTES # BLD AUTO: 1.24 K/UL (ref 1–4.8)
LYMPHOCYTES NFR BLD: 24.9 % (ref 22–41)
MCH RBC QN AUTO: 34 PG (ref 27–33)
MCHC RBC AUTO-ENTMCNC: 34.1 G/DL (ref 32.3–36.5)
MCV RBC AUTO: 99.5 FL (ref 81.4–97.8)
MONOCYTES # BLD AUTO: 0.49 K/UL (ref 0–0.85)
MONOCYTES NFR BLD AUTO: 9.9 % (ref 0–13.4)
NEUTROPHILS # BLD AUTO: 2.84 K/UL (ref 1.82–7.42)
NEUTROPHILS NFR BLD: 57.2 % (ref 44–72)
NRBC # BLD AUTO: 0 K/UL
NRBC BLD-RTO: 0 /100 WBC (ref 0–0.2)
PLATELET # BLD AUTO: 195 K/UL (ref 164–446)
PMV BLD AUTO: 10.2 FL (ref 9–12.9)
POTASSIUM SERPL-SCNC: 4.7 MMOL/L (ref 3.6–5.5)
PROT SERPL-MCNC: 6.5 G/DL (ref 6–8.2)
RBC # BLD AUTO: 3.74 M/UL (ref 4.7–6.1)
SODIUM SERPL-SCNC: 136 MMOL/L (ref 135–145)
TRIGL SERPL-MCNC: 105 MG/DL (ref 0–149)
TSH SERPL DL<=0.005 MIU/L-ACNC: 3.73 UIU/ML (ref 0.38–5.33)
WBC # BLD AUTO: 5 K/UL (ref 4.8–10.8)

## 2023-08-18 PROCEDURE — 80053 COMPREHEN METABOLIC PANEL: CPT

## 2023-08-18 PROCEDURE — 85025 COMPLETE CBC W/AUTO DIFF WBC: CPT

## 2023-08-18 PROCEDURE — 80061 LIPID PANEL: CPT

## 2023-08-18 PROCEDURE — 36415 COLL VENOUS BLD VENIPUNCTURE: CPT

## 2023-08-18 PROCEDURE — 84443 ASSAY THYROID STIM HORMONE: CPT

## 2023-08-24 ENCOUNTER — APPOINTMENT (OUTPATIENT)
Dept: MEDICAL GROUP | Facility: PHYSICIAN GROUP | Age: 78
End: 2023-08-24
Payer: MEDICARE

## 2023-09-06 ENCOUNTER — OFFICE VISIT (OUTPATIENT)
Dept: MEDICAL GROUP | Facility: PHYSICIAN GROUP | Age: 78
End: 2023-09-06
Payer: MEDICARE

## 2023-09-06 VITALS
WEIGHT: 283.1 LBS | TEMPERATURE: 97.1 F | OXYGEN SATURATION: 94 % | BODY MASS INDEX: 44.43 KG/M2 | DIASTOLIC BLOOD PRESSURE: 64 MMHG | HEART RATE: 78 BPM | HEIGHT: 67 IN | SYSTOLIC BLOOD PRESSURE: 130 MMHG | RESPIRATION RATE: 16 BRPM

## 2023-09-06 DIAGNOSIS — R80.9 TYPE 2 DIABETES MELLITUS WITH MICROALBUMINURIA, WITHOUT LONG-TERM CURRENT USE OF INSULIN (HCC): ICD-10-CM

## 2023-09-06 DIAGNOSIS — Z00.00 ENCOUNTER FOR SUBSEQUENT ANNUAL WELLNESS VISIT (AWV) IN MEDICARE PATIENT: Primary | ICD-10-CM

## 2023-09-06 DIAGNOSIS — Z12.11 COLON CANCER SCREENING: ICD-10-CM

## 2023-09-06 DIAGNOSIS — E11.29 TYPE 2 DIABETES MELLITUS WITH MICROALBUMINURIA, WITHOUT LONG-TERM CURRENT USE OF INSULIN (HCC): ICD-10-CM

## 2023-09-06 DIAGNOSIS — E03.9 ACQUIRED HYPOTHYROIDISM: ICD-10-CM

## 2023-09-06 DIAGNOSIS — I10 ESSENTIAL HYPERTENSION: ICD-10-CM

## 2023-09-06 DIAGNOSIS — L60.0 INGROWN TOENAIL OF LEFT FOOT WITH INFECTION: ICD-10-CM

## 2023-09-06 PROBLEM — C61 MALIGNANT TUMOR OF PROSTATE (HCC): Status: ACTIVE | Noted: 2023-09-06

## 2023-09-06 LAB
HBA1C MFR BLD: 5.7 % (ref ?–5.8)
POCT INT CON NEG: NEGATIVE
POCT INT CON POS: POSITIVE

## 2023-09-06 PROCEDURE — G0439 PPPS, SUBSEQ VISIT: HCPCS | Performed by: INTERNAL MEDICINE

## 2023-09-06 PROCEDURE — 3075F SYST BP GE 130 - 139MM HG: CPT | Performed by: INTERNAL MEDICINE

## 2023-09-06 PROCEDURE — 83036 HEMOGLOBIN GLYCOSYLATED A1C: CPT | Performed by: INTERNAL MEDICINE

## 2023-09-06 PROCEDURE — 3078F DIAST BP <80 MM HG: CPT | Performed by: INTERNAL MEDICINE

## 2023-09-06 RX ORDER — TRIAMTERENE AND HYDROCHLOROTHIAZIDE 37.5; 25 MG/1; MG/1
1 TABLET ORAL DAILY
Qty: 100 TABLET | Refills: 3 | Status: SHIPPED | OUTPATIENT
Start: 2023-09-06 | End: 2024-03-25

## 2023-09-06 RX ORDER — NIFEDIPINE 30 MG/1
30 TABLET, EXTENDED RELEASE ORAL DAILY
Qty: 100 TABLET | Refills: 3 | Status: SHIPPED | OUTPATIENT
Start: 2023-09-06

## 2023-09-06 ASSESSMENT — FIBROSIS 4 INDEX: FIB4 SCORE: 1.79

## 2023-09-06 ASSESSMENT — PATIENT HEALTH QUESTIONNAIRE - PHQ9: CLINICAL INTERPRETATION OF PHQ2 SCORE: 0

## 2023-09-06 ASSESSMENT — ACTIVITIES OF DAILY LIVING (ADL): BATHING_REQUIRES_ASSISTANCE: 0

## 2023-09-06 ASSESSMENT — ENCOUNTER SYMPTOMS: GENERAL WELL-BEING: POOR

## 2023-09-06 NOTE — ASSESSMENT & PLAN NOTE
Chronic stable problem, will add back nifedipine 30 mg daily and low-dose triamterene-hydrochlorothiazide 37.5-25 mg daily to help with fluid retention and occasional elevations of systolic blood pressure.  Continue metoprolol succinate 25 mg daily and losartan 50 mg daily unchanged.  Mild decrease in kidney function on latest lab work but otherwise stable.

## 2023-09-06 NOTE — ASSESSMENT & PLAN NOTE
New decompensated problem.  With his underlying diabetes and 4-week duration of swelling, pain, and likely underlying fungal infection with ingrown toenail I think he needs see podiatry ASAP to have the toenail removed.

## 2023-09-06 NOTE — ASSESSMENT & PLAN NOTE
Chronic stable problem, even with discontinuation Trulicity due to cost concerns he has improved his diet and been able to significantly improve once he.  Continue metformin as milligrams twice daily and pioglitazone 30 mg daily.  Recheck A1c again in 3 to 4 months to ensure stability without Trulicity.
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
## 2023-11-29 DIAGNOSIS — F32.5 MAJOR DEPRESSIVE DISORDER WITH SINGLE EPISODE, IN FULL REMISSION (HCC): ICD-10-CM

## 2023-11-30 RX ORDER — ESCITALOPRAM OXALATE 20 MG/1
20 TABLET ORAL
Qty: 100 TABLET | Refills: 3 | Status: SHIPPED | OUTPATIENT
Start: 2023-11-30

## 2023-11-30 NOTE — TELEPHONE ENCOUNTER
Received request via: Patient    Was the patient seen in the last year in this department? Yes    Does the patient have an active prescription (recently filled or refills available) for medication(s) requested? No    Does the patient have FDC Plus and need 100 day supply (blood pressure, diabetes and cholesterol meds only)? Medication is not for cholesterol, blood pressure or diabetes

## 2023-12-04 DIAGNOSIS — E11.29 TYPE 2 DIABETES MELLITUS WITH MICROALBUMINURIA, WITHOUT LONG-TERM CURRENT USE OF INSULIN (HCC): ICD-10-CM

## 2023-12-04 DIAGNOSIS — E78.2 MIXED HYPERLIPIDEMIA: ICD-10-CM

## 2023-12-04 DIAGNOSIS — R80.9 TYPE 2 DIABETES MELLITUS WITH MICROALBUMINURIA, WITHOUT LONG-TERM CURRENT USE OF INSULIN (HCC): ICD-10-CM

## 2023-12-04 DIAGNOSIS — Z12.5 ENCOUNTER FOR SCREENING FOR MALIGNANT NEOPLASM OF PROSTATE: ICD-10-CM

## 2023-12-04 DIAGNOSIS — E03.9 ACQUIRED HYPOTHYROIDISM: ICD-10-CM

## 2023-12-06 ENCOUNTER — APPOINTMENT (OUTPATIENT)
Dept: LAB | Facility: MEDICAL CENTER | Age: 78
End: 2023-12-06
Payer: MEDICARE

## 2023-12-11 ENCOUNTER — APPOINTMENT (OUTPATIENT)
Dept: MEDICAL GROUP | Facility: PHYSICIAN GROUP | Age: 78
End: 2023-12-11
Payer: MEDICARE

## 2023-12-29 ENCOUNTER — HOSPITAL ENCOUNTER (OUTPATIENT)
Dept: LAB | Facility: MEDICAL CENTER | Age: 78
End: 2023-12-29
Attending: INTERNAL MEDICINE
Payer: MEDICARE

## 2023-12-29 DIAGNOSIS — E78.2 MIXED HYPERLIPIDEMIA: ICD-10-CM

## 2023-12-29 DIAGNOSIS — E11.29 TYPE 2 DIABETES MELLITUS WITH MICROALBUMINURIA, WITHOUT LONG-TERM CURRENT USE OF INSULIN (HCC): ICD-10-CM

## 2023-12-29 DIAGNOSIS — R80.9 TYPE 2 DIABETES MELLITUS WITH MICROALBUMINURIA, WITHOUT LONG-TERM CURRENT USE OF INSULIN (HCC): ICD-10-CM

## 2023-12-29 DIAGNOSIS — E03.9 ACQUIRED HYPOTHYROIDISM: ICD-10-CM

## 2023-12-29 DIAGNOSIS — Z12.5 ENCOUNTER FOR SCREENING FOR MALIGNANT NEOPLASM OF PROSTATE: ICD-10-CM

## 2023-12-29 LAB
25(OH)D3 SERPL-MCNC: 44 NG/ML (ref 30–100)
ALBUMIN SERPL BCP-MCNC: 4.4 G/DL (ref 3.2–4.9)
ALBUMIN/GLOB SERPL: 1.8 G/DL
ALP SERPL-CCNC: 60 U/L (ref 30–99)
ALT SERPL-CCNC: 17 U/L (ref 2–50)
ANION GAP SERPL CALC-SCNC: 11 MMOL/L (ref 7–16)
AST SERPL-CCNC: 17 U/L (ref 12–45)
BASOPHILS # BLD AUTO: 0.9 % (ref 0–1.8)
BASOPHILS # BLD: 0.04 K/UL (ref 0–0.12)
BILIRUB SERPL-MCNC: 0.4 MG/DL (ref 0.1–1.5)
BUN SERPL-MCNC: 42 MG/DL (ref 8–22)
CALCIUM ALBUM COR SERPL-MCNC: 8.7 MG/DL (ref 8.5–10.5)
CALCIUM SERPL-MCNC: 9 MG/DL (ref 8.5–10.5)
CHLORIDE SERPL-SCNC: 106 MMOL/L (ref 96–112)
CHOLEST SERPL-MCNC: 137 MG/DL (ref 100–199)
CO2 SERPL-SCNC: 22 MMOL/L (ref 20–33)
CREAT SERPL-MCNC: 1.32 MG/DL (ref 0.5–1.4)
CREAT UR-MCNC: 77.3 MG/DL
EOSINOPHIL # BLD AUTO: 0.19 K/UL (ref 0–0.51)
EOSINOPHIL NFR BLD: 4.4 % (ref 0–6.9)
ERYTHROCYTE [DISTWIDTH] IN BLOOD BY AUTOMATED COUNT: 47.3 FL (ref 35.9–50)
EST. AVERAGE GLUCOSE BLD GHB EST-MCNC: 128 MG/DL
FASTING STATUS PATIENT QL REPORTED: NORMAL
GFR SERPLBLD CREATININE-BSD FMLA CKD-EPI: 56 ML/MIN/1.73 M 2
GLOBULIN SER CALC-MCNC: 2.4 G/DL (ref 1.9–3.5)
GLUCOSE SERPL-MCNC: 135 MG/DL (ref 65–99)
HBA1C MFR BLD: 6.1 % (ref 4–5.6)
HCT VFR BLD AUTO: 38.1 % (ref 42–52)
HDLC SERPL-MCNC: 45 MG/DL
HGB BLD-MCNC: 13 G/DL (ref 14–18)
IMM GRANULOCYTES # BLD AUTO: 0.01 K/UL (ref 0–0.11)
IMM GRANULOCYTES NFR BLD AUTO: 0.2 % (ref 0–0.9)
LDLC SERPL CALC-MCNC: 71 MG/DL
LYMPHOCYTES # BLD AUTO: 1.02 K/UL (ref 1–4.8)
LYMPHOCYTES NFR BLD: 23.7 % (ref 22–41)
MCH RBC QN AUTO: 34.9 PG (ref 27–33)
MCHC RBC AUTO-ENTMCNC: 34.1 G/DL (ref 32.3–36.5)
MCV RBC AUTO: 102.4 FL (ref 81.4–97.8)
MICROALBUMIN UR-MCNC: 3.1 MG/DL
MICROALBUMIN/CREAT UR: 40 MG/G (ref 0–30)
MONOCYTES # BLD AUTO: 0.45 K/UL (ref 0–0.85)
MONOCYTES NFR BLD AUTO: 10.5 % (ref 0–13.4)
NEUTROPHILS # BLD AUTO: 2.59 K/UL (ref 1.82–7.42)
NEUTROPHILS NFR BLD: 60.3 % (ref 44–72)
NRBC # BLD AUTO: 0 K/UL
NRBC BLD-RTO: 0 /100 WBC (ref 0–0.2)
PLATELET # BLD AUTO: 181 K/UL (ref 164–446)
PMV BLD AUTO: 10.2 FL (ref 9–12.9)
POTASSIUM SERPL-SCNC: 4.7 MMOL/L (ref 3.6–5.5)
PROT SERPL-MCNC: 6.8 G/DL (ref 6–8.2)
PSA SERPL-MCNC: 0.32 NG/ML (ref 0–4)
RBC # BLD AUTO: 3.72 M/UL (ref 4.7–6.1)
SODIUM SERPL-SCNC: 139 MMOL/L (ref 135–145)
T4 FREE SERPL-MCNC: 1.33 NG/DL (ref 0.93–1.7)
TRIGL SERPL-MCNC: 105 MG/DL (ref 0–149)
TSH SERPL DL<=0.005 MIU/L-ACNC: 2.69 UIU/ML (ref 0.38–5.33)
VIT B12 SERPL-MCNC: 370 PG/ML (ref 211–911)
WBC # BLD AUTO: 4.3 K/UL (ref 4.8–10.8)

## 2023-12-29 PROCEDURE — 82043 UR ALBUMIN QUANTITATIVE: CPT

## 2023-12-29 PROCEDURE — 85025 COMPLETE CBC W/AUTO DIFF WBC: CPT

## 2023-12-29 PROCEDURE — 82306 VITAMIN D 25 HYDROXY: CPT

## 2023-12-29 PROCEDURE — 82607 VITAMIN B-12: CPT

## 2023-12-29 PROCEDURE — 82570 ASSAY OF URINE CREATININE: CPT

## 2023-12-29 PROCEDURE — 84443 ASSAY THYROID STIM HORMONE: CPT

## 2023-12-29 PROCEDURE — 84153 ASSAY OF PSA TOTAL: CPT

## 2023-12-29 PROCEDURE — 84439 ASSAY OF FREE THYROXINE: CPT

## 2023-12-29 PROCEDURE — 80053 COMPREHEN METABOLIC PANEL: CPT

## 2023-12-29 PROCEDURE — 83036 HEMOGLOBIN GLYCOSYLATED A1C: CPT

## 2023-12-29 PROCEDURE — 80061 LIPID PANEL: CPT

## 2023-12-29 PROCEDURE — 36415 COLL VENOUS BLD VENIPUNCTURE: CPT

## 2023-12-30 DIAGNOSIS — E11.65 TYPE 2 DIABETES MELLITUS WITH HYPERGLYCEMIA, WITHOUT LONG-TERM CURRENT USE OF INSULIN (HCC): ICD-10-CM

## 2024-01-02 RX ORDER — LOSARTAN POTASSIUM 50 MG/1
50 TABLET ORAL DAILY
Qty: 100 TABLET | Refills: 3 | Status: SHIPPED | OUTPATIENT
Start: 2024-01-02

## 2024-01-08 ENCOUNTER — APPOINTMENT (OUTPATIENT)
Dept: MEDICAL GROUP | Facility: PHYSICIAN GROUP | Age: 79
End: 2024-01-08
Payer: MEDICARE

## 2024-01-22 ENCOUNTER — OFFICE VISIT (OUTPATIENT)
Dept: MEDICAL GROUP | Facility: PHYSICIAN GROUP | Age: 79
End: 2024-01-22
Payer: MEDICARE

## 2024-01-22 VITALS
SYSTOLIC BLOOD PRESSURE: 120 MMHG | BODY MASS INDEX: 46.46 KG/M2 | TEMPERATURE: 98 F | DIASTOLIC BLOOD PRESSURE: 60 MMHG | HEART RATE: 66 BPM | WEIGHT: 296 LBS | OXYGEN SATURATION: 98 % | HEIGHT: 67 IN

## 2024-01-22 DIAGNOSIS — F32.5 MAJOR DEPRESSIVE DISORDER WITH SINGLE EPISODE, IN FULL REMISSION (HCC): ICD-10-CM

## 2024-01-22 DIAGNOSIS — C61 MALIGNANT TUMOR OF PROSTATE (HCC): ICD-10-CM

## 2024-01-22 DIAGNOSIS — E11.29 TYPE 2 DIABETES MELLITUS WITH MICROALBUMINURIA, WITHOUT LONG-TERM CURRENT USE OF INSULIN (HCC): ICD-10-CM

## 2024-01-22 DIAGNOSIS — E03.9 ACQUIRED HYPOTHYROIDISM: ICD-10-CM

## 2024-01-22 DIAGNOSIS — R80.9 TYPE 2 DIABETES MELLITUS WITH MICROALBUMINURIA, WITHOUT LONG-TERM CURRENT USE OF INSULIN (HCC): ICD-10-CM

## 2024-01-22 DIAGNOSIS — F32.1 CURRENT MODERATE EPISODE OF MAJOR DEPRESSIVE DISORDER WITHOUT PRIOR EPISODE (HCC): ICD-10-CM

## 2024-01-22 DIAGNOSIS — E66.01 CLASS 3 SEVERE OBESITY DUE TO EXCESS CALORIES WITH SERIOUS COMORBIDITY AND BODY MASS INDEX (BMI) OF 45.0 TO 49.9 IN ADULT (HCC): ICD-10-CM

## 2024-01-22 DIAGNOSIS — I77.810 DILATED AORTIC ROOT (HCC): ICD-10-CM

## 2024-01-22 DIAGNOSIS — N18.31 STAGE 3A CHRONIC KIDNEY DISEASE: ICD-10-CM

## 2024-01-22 LAB — RETINAL SCREEN: NEGATIVE

## 2024-01-22 PROCEDURE — 3078F DIAST BP <80 MM HG: CPT | Performed by: INTERNAL MEDICINE

## 2024-01-22 PROCEDURE — 3074F SYST BP LT 130 MM HG: CPT | Performed by: INTERNAL MEDICINE

## 2024-01-22 PROCEDURE — 92250 FUNDUS PHOTOGRAPHY W/I&R: CPT | Mod: TC | Performed by: INTERNAL MEDICINE

## 2024-01-22 PROCEDURE — 99214 OFFICE O/P EST MOD 30 MIN: CPT | Performed by: INTERNAL MEDICINE

## 2024-01-22 RX ORDER — BUPROPION HYDROCHLORIDE 75 MG/1
75 TABLET ORAL 2 TIMES DAILY
Qty: 200 TABLET | Refills: 3 | Status: SHIPPED | OUTPATIENT
Start: 2024-01-22 | End: 2024-03-25

## 2024-01-22 ASSESSMENT — PATIENT HEALTH QUESTIONNAIRE - PHQ9
5. POOR APPETITE OR OVEREATING: 3 - NEARLY EVERY DAY
CLINICAL INTERPRETATION OF PHQ2 SCORE: 5
SUM OF ALL RESPONSES TO PHQ QUESTIONS 1-9: 17

## 2024-01-22 ASSESSMENT — FIBROSIS 4 INDEX: FIB4 SCORE: 1.73

## 2024-01-22 NOTE — ASSESSMENT & PLAN NOTE
Chronic stable problem, continue follow-up with cardiology as recommended, blood pressure and heart rate are well-controlled.

## 2024-01-22 NOTE — ASSESSMENT & PLAN NOTE
Chronic decompensated issue, will continue escitalopram 20 mg daily and add Wellbutrin 75 mg twice daily.  Asked him to consider therapy which she kindly declines at this time.  Discussed making SMART goals such as going for a daily walk or spending some time working with music which she enjoys letting his daughters know so they can help to hold him accountable.

## 2024-01-22 NOTE — ASSESSMENT & PLAN NOTE
Chronic ongoing problem, mild reduction of GFR, likely multifactorial from diabetes and heart disease. Continue with good oral hydration.

## 2024-01-22 NOTE — ASSESSMENT & PLAN NOTE
Previous problem, stable, continue with regular PSA levels per urology for active surveillance for recurrence.

## 2024-01-22 NOTE — PROGRESS NOTES
Subjective:   Chief Complaint/History of Present Illness:  Milton Javed Jr. is a 78 y.o. male established patient who presents today to discuss medical problems as listed below. Milton is unaccompanied for today's visit.    Problem   Stage 3a Chronic Kidney Disease (Hcc)     Latest Reference Range & Units 12/29/23 09:55   Bun 8 - 22 mg/dL 42 (H)   Creatinine 0.50 - 1.40 mg/dL 1.32   GFR (CKD-EPI) >60 mL/min/1.73 m 2 56 !     Mild chronic kidney disease, likely multifactorial.     Malignant Tumor of Prostate ([facility])    He has history of prostate cancer and is currently on active surveillance, most recent PSA level is reassuring.     Class 3 Severe Obesity Due to Excess Calories With Serious Comorbidity and Body Mass Index (Bmi) of 45.0 to 49.9 in Adult ([facility])    He has BMI of 44.01 with a weight of 280 pounds.  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  He is taking metformin but this causes severe diarrhea. He is interested in trying a GLP1 agonist therapy due to his intolerance to metformin and history of diabetes.    He Lost a few pounds after starting Trulicity but has not been able to afford it the past few weeks so we will look into patient assistance.     Dilated Aortic Root ([facility])    CTA Chest (2/2020): Ascending thoracic aorta is top normal in size at 4 cm in diameter.  No dissection.    Incidental finding on CT imaging in 2018, 4.1 cm ascending aortic aneurysm.  Follow-up imaging has showed stability, he follows with cardiology.     Current Moderate Episode of Major Depressive Disorder Without Prior Episode ([facility])    Depression Screening (1/2024)    Little interest or pleasure in doing things?  3 - nearly every day   Feeling down, depressed , or hopeless? 2 - more than half the days   Trouble falling or staying asleep, or sleeping too much?  3 - nearly every day   Feeling tired or  having little energy?  3 - nearly every day   Poor appetite or overeating?  3 - nearly every day   Feeling bad about yourself - or that you are a failure or have let yourself or your family down? 2 - more than half the days   Trouble concentrating on things, such as reading the newspaper or watching television? 1 - several days   Moving or speaking so slowly that other people could have noticed.  Or the opposite - being so fidgety or restless that you have been moving around a lot more than usual?  0 - not at all   Thoughts that you would be better off dead, or of hurting yourself?  0 - not at all   Patient Health Questionnaire Score: 17       He reports labile mood with depression starting in June 2019, he was initiated on SSRI therapy at that time with 1 dose increase with resolution of the depression.  No known side effects from therapy, he would like to continue at this time.  Previously was taking what sounds to be a benzodiazepine which made him quite groggy.    On follow-up in January 2024 he reports more apathy and anhedonia, he is having trouble getting motivated to reengage in his hobbies and notes his holiday season has been much harder on him after the loss of his wife.  He has wonderful support from his 2 daughters who live in [place] and [place].    Current regimen: escitalopram 20 mg daily and wellbutrin 75 mg twice daily     Acquired Hypothyroidism     Latest Reference Range & Units 12/29/23 09:55   TSH 0.380 - 5.330 uIU/mL 2.690   Free T-4 0.93 - 1.70 ng/dL 1.33     Diagnosed based off abnormal labs in the past, notes dose was originally 50 mcg and then increased to 75 mcg. No signs/symptoms of over treatment or undertreatment at this time.    Current regimen: levothyroxine 75 mcg daily     Type 2 Diabetes Mellitus With Microalbuminuria, Without Long-Term Current Use of Insulin (Hcc)     Latest Reference Range & Units 12/29/23 09:55   Glycohemoglobin 4.0 - 5.6 % 6.1 (H)   Estim. Avg Glu mg/dL 128    Micro Alb Creat Ratio 0 - 30 mg/g 40 (H)       Diagnosed in August 2020, treated with metformin, recent deterioration in Summer/Fall 2022 due to his wife's health decline and dietary indiscretion and decreased activity. He developed transient microalbuminuria which has since resolved with losartan and better control of diabetes. No known neuropathy or retinopathy.    Current regimen: metformin ER 1000 mg BID and pioglitazone 30 mg daily          Current Medications:  Current Outpatient Medications Ordered in Epic   Medication Sig Dispense Refill    buPROPion (WELLBUTRIN) 75 MG Tab Take 1 Tablet by mouth 2 times a day. 200 Tablet 3    losartan (COZAAR) 50 MG Tab TAKE 1 TABLET BY MOUTH EVERY  Tablet 3    escitalopram (LEXAPRO) 20 MG tablet TAKE 1 TABLET BY MOUTH EVERY  Tablet 3    NIFEdipine SR (PROCADIA-XL) 30 MG tablet Take 1 Tablet by mouth every day. 100 Tablet 3    triamterene-hctz (MAXZIDE-25/DYAZIDE) 37.5-25 MG Tab Take 1 Tablet by mouth every day. 100 Tablet 3    metFORMIN ER (GLUCOPHAGE XR) 500 MG TABLET SR 24 HR TAKE 2 TABLETS BY MOUTH TWICE DAILY 400 Tablet 0    Nystatin Powder 1 Application 4 times a day as needed (skin fold irritation). 1 Each 0    gabapentin (NEURONTIN) 300 MG Cap Take 2 Capsules by mouth 3 times a day. 600 Capsule 3    levothyroxine (SYNTHROID) 75 MCG Tab Take 1 Tablet by mouth every morning on an empty stomach. 100 Tablet 3    metoprolol SR (TOPROL XL) 25 MG TABLET SR 24 HR Take 1 Tablet by mouth every day. 100 Tablet 3    ezetimibe (ZETIA) 10 MG Tab Take 1 Tablet by mouth every day. 100 Tablet 3    pioglitazone (ACTOS) 30 MG Tab Take 1 Tablet by mouth every day. 100 Tablet 3    rosuvastatin (CRESTOR) 20 MG Tab Take 1 Tablet by mouth every evening. 100 Tablet 3    Blood Glucose Monitoring Suppl Device Meter: Dispense insurance preffered meter. Sig. Use as directed for blood sugar monitoring. 1 Each 0    Blood Glucose Test Strips Test strips order: Test strips for  "insurance approved meter. Sig: use BID and prn ssx high or low sugar, max 4x/d 100 Strip 3    Lancets Lancets order: Lancets for insurance preffered meter. Sig: use BID and prn ssx high or low sugar Max 4x/day 100 Each 3    oxyCODONE immediate-release (ROXICODONE) 5 MG Tab Take 1 Tablet by mouth every four hours as needed for Severe Pain.      aspirin (ASA) 81 MG Chew Tab chewable tablet Chew 1 Tablet every day.      B Complex Vitamins (VITAMIN B COMPLEX PO) Take  by mouth.      Blood Glucose Monitoring Suppl SUPPLIES Misc Test strips order: Test strips for [employer] Contour Next meter. Sig: use daily for medication adjustment.  Dx. Code E11.65 100 Strip 3    Cholecalciferol (VITAMIN D3 PO) Take  by mouth.      Fish Oil Oil by Does not apply route.      Ascorbic Acid (VITAMIN C) 1000 MG Tab Take  by mouth.      albuterol 108 (90 BASE) MCG/ACT Aero Soln inhalation aerosol Inhale 2 Puffs by mouth every 6 hours as needed for Shortness of Breath. 8.5 g 3     No current [facility]-ordered facility-administered medications on file.          Objective:   Physical Exam:    Vitals: /60   Pulse 66   Temp 36.7 °C (98 °F) (Temporal)   Ht 1.702 m (5' 7\")   Wt (!) 134 kg (296 lb)   SpO2 98%    BMI: Body mass index is 46.36 kg/m².  Physical Exam  Constitutional:       General: He is not in acute distress.     Appearance: Normal appearance. He is not ill-appearing.   HENT:      Right Ear: Ear canal and external ear normal. There is no impacted cerumen.      Left Ear: Ear canal and external ear normal. There is no impacted cerumen.      Ears:      Comments: Hard of hearing  Eyes:      General: No scleral icterus.     Conjunctiva/sclera: Conjunctivae normal.   Cardiovascular:      Rate and Rhythm: Normal rate and regular rhythm.      Pulses: Normal pulses.      Heart sounds: No murmur heard.  Pulmonary:      Effort: Pulmonary effort is normal. No respiratory distress.      Breath sounds: Normal breath sounds. No wheezing, rhonchi or " rales.   Abdominal:      General: Bowel sounds are normal. There is no distension.      Palpations: Abdomen is soft.      Tenderness: There is no abdominal tenderness.   Musculoskeletal:      Right lower leg: Edema present.      Left lower leg: Edema present.      Comments: Monofilament testing with a 10 gram force: sensation intact: intact bilaterally  Visual Inspection: Feet without maceration, ulcers, fissures.  Pedal pulses: intact bilaterally     Lymphadenopathy:      Cervical: No cervical adenopathy.   Skin:     General: Skin is warm and dry.      Findings: No rash.   Neurological:      Gait: Gait normal.   Psychiatric:         Mood and Affect: Mood normal.         Behavior: Behavior normal.         Thought Content: Thought content normal.         Judgment: Judgment normal.               Assessment and Plan:   Milton is a 78 y.o. male with the following:  Problem List Items Addressed This Visit       Acquired hypothyroidism     Chronic stable problem, continue levothyroxine 75 mcg daily, lab work is stable.         Class 3 severe obesity due to excess calories with serious comorbidity and body mass index (BMI) of 45.0 to 49.9 in adult (HCC)     Chronic compensated issue, was down close to 280 pounds but can no longer afford the Trulicity so he is now off it and weight has crept up to 296 pounds.  He knows what he needs to do but is having trouble getting his nutrition and activity increased.         Current moderate episode of major depressive disorder without prior episode (HCC)     Chronic decompensated issue, will continue escitalopram 20 mg daily and add Wellbutrin 75 mg twice daily.  Asked him to consider therapy which she kindly declines at this time.  Discussed making SMART goals such as going for a daily walk or spending some time working with music which she enjoys letting his daughters know so they can help to hold him accountable.         Relevant Medications    buPROPion (WELLBUTRIN) 75 MG Tab     Dilated aortic root (HCC)     Chronic stable problem, continue follow-up with cardiology as recommended, blood pressure and heart rate are well-controlled.         Malignant tumor of prostate (HCC)     Previous problem, stable, continue with regular PSA levels per urology for active surveillance for recurrence.         Stage 3a chronic kidney disease (HCC)     Chronic ongoing problem, mild reduction of GFR, likely multifactorial from diabetes and heart disease. Continue with good oral hydration.         Type 2 diabetes mellitus with microalbuminuria, without long-term current use of insulin (HCC)     Chronic ongoing problem, A1c well-controlled on dual therapy with metformin at 1000 mg twice daily pioglitazone 30 mg daily.  Slight worsening of microalbuminuria, appropriately on losartan 50 mg daily, continue at this time.  Retinal exam completed in clinic today.  Monofilament testing with intact sensation and pulses but some evidence of venous stasis and lower extremity edema.         Relevant Orders    POCT Retinal Eye Exam    Diabetic Monofilament LE Exam (Completed)          RTC: Return in about 2 months (around 3/22/2024).    I spent a total of 36 minutes with record review, exam, communication with the patient, communication with other providers, and documentation of this encounter.    PLEASE NOTE: This dictation was created using voice recognition software. I have made every reasonable attempt to correct obvious errors, but I expect that there are errors of grammar and possibly content that I did not discover before finalizing the note.      Agnes Genao, DO  Geriatric and Internal Medicine  [facility]

## 2024-01-22 NOTE — ASSESSMENT & PLAN NOTE
Chronic compensated issue, was down close to 280 pounds but can no longer afford the Trulicity so he is now off it and weight has crept up to 296 pounds.  He knows what he needs to do but is having trouble getting his nutrition and activity increased.

## 2024-01-22 NOTE — ASSESSMENT & PLAN NOTE
Chronic ongoing problem, A1c well-controlled on dual therapy with metformin at 1000 mg twice daily pioglitazone 30 mg daily.  Slight worsening of microalbuminuria, appropriately on losartan 50 mg daily, continue at this time.  Retinal exam completed in clinic today.  Monofilament testing with intact sensation and pulses but some evidence of venous stasis and lower extremity edema.

## 2024-01-28 DIAGNOSIS — E11.65 TYPE 2 DIABETES MELLITUS WITH HYPERGLYCEMIA, WITHOUT LONG-TERM CURRENT USE OF INSULIN (HCC): ICD-10-CM

## 2024-01-29 RX ORDER — METFORMIN HYDROCHLORIDE 500 MG/1
TABLET, EXTENDED RELEASE ORAL
Qty: 400 TABLET | Refills: 3 | Status: SHIPPED | OUTPATIENT
Start: 2024-01-29

## 2024-01-29 NOTE — TELEPHONE ENCOUNTER
Received request via: Pharmacy    Was the patient seen in the last year in this department? Yes    Does the patient have an active prescription (recently filled or refills available) for medication(s) requested? No      Does the patient have custodial Plus and need 100 day supply (blood pressure, diabetes and cholesterol meds only)? Yes, quantity updated to 100 days

## 2024-02-13 ENCOUNTER — APPOINTMENT (OUTPATIENT)
Dept: MEDICAL GROUP | Facility: PHYSICIAN GROUP | Age: 79
End: 2024-02-13
Payer: MEDICARE

## 2024-02-20 DIAGNOSIS — E03.9 ACQUIRED HYPOTHYROIDISM: ICD-10-CM

## 2024-02-20 RX ORDER — LEVOTHYROXINE SODIUM 0.07 MG/1
75 TABLET ORAL
Qty: 100 TABLET | Refills: 3 | Status: SHIPPED | OUTPATIENT
Start: 2024-02-20

## 2024-03-04 DIAGNOSIS — E78.2 MIXED HYPERLIPIDEMIA: ICD-10-CM

## 2024-03-04 RX ORDER — EZETIMIBE 10 MG/1
10 TABLET ORAL DAILY
Qty: 100 TABLET | Refills: 3 | Status: SHIPPED | OUTPATIENT
Start: 2024-03-04

## 2024-03-04 NOTE — TELEPHONE ENCOUNTER
Received request via: Patient    Was the patient seen in the last year in this department? Yes    Does the patient have an active prescription (recently filled or refills available) for medication(s) requested? No    [facility]: [facility]    Does the patient have [facility] Plus and need 100 day supply (blood pressure, diabetes and cholesterol meds only)? Yes, quantity updated to 100 days

## 2024-03-07 DIAGNOSIS — E78.2 MIXED HYPERLIPIDEMIA: ICD-10-CM

## 2024-03-07 RX ORDER — ROSUVASTATIN CALCIUM 20 MG/1
20 TABLET, COATED ORAL EVERY EVENING
Qty: 100 TABLET | Refills: 3 | Status: SHIPPED | OUTPATIENT
Start: 2024-03-07

## 2024-03-07 NOTE — TELEPHONE ENCOUNTER
Received request via: Patient    Was the patient seen in the last year in this department? Yes    Does the patient have an active prescription (recently filled or refills available) for medication(s) requested? No    [facility]    Does the patient have [facility] Plus and need 100 day supply (blood pressure, diabetes and cholesterol meds only)? Yes, quantity updated to 100 days

## 2024-03-25 ENCOUNTER — OFFICE VISIT (OUTPATIENT)
Dept: MEDICAL GROUP | Facility: PHYSICIAN GROUP | Age: 79
End: 2024-03-25
Payer: MEDICARE

## 2024-03-25 VITALS
HEIGHT: 67 IN | OXYGEN SATURATION: 94 % | HEART RATE: 75 BPM | TEMPERATURE: 96.3 F | DIASTOLIC BLOOD PRESSURE: 40 MMHG | WEIGHT: 296.5 LBS | BODY MASS INDEX: 46.54 KG/M2 | SYSTOLIC BLOOD PRESSURE: 128 MMHG | RESPIRATION RATE: 16 BRPM

## 2024-03-25 DIAGNOSIS — F32.0 CURRENT MILD EPISODE OF MAJOR DEPRESSIVE DISORDER WITHOUT PRIOR EPISODE (HCC): ICD-10-CM

## 2024-03-25 DIAGNOSIS — E03.9 ACQUIRED HYPOTHYROIDISM: ICD-10-CM

## 2024-03-25 DIAGNOSIS — R80.9 TYPE 2 DIABETES MELLITUS WITH MICROALBUMINURIA, WITHOUT LONG-TERM CURRENT USE OF INSULIN (HCC): ICD-10-CM

## 2024-03-25 DIAGNOSIS — E78.2 MIXED HYPERLIPIDEMIA: ICD-10-CM

## 2024-03-25 DIAGNOSIS — E11.29 TYPE 2 DIABETES MELLITUS WITH MICROALBUMINURIA, WITHOUT LONG-TERM CURRENT USE OF INSULIN (HCC): ICD-10-CM

## 2024-03-25 PROCEDURE — 3074F SYST BP LT 130 MM HG: CPT | Performed by: INTERNAL MEDICINE

## 2024-03-25 PROCEDURE — 3078F DIAST BP <80 MM HG: CPT | Performed by: INTERNAL MEDICINE

## 2024-03-25 PROCEDURE — 99214 OFFICE O/P EST MOD 30 MIN: CPT | Performed by: INTERNAL MEDICINE

## 2024-03-25 RX ORDER — POLYETHYLENE GLYCOL-3350 AND ELECTROLYTES 236; 6.74; 5.86; 2.97; 22.74 G/274.31G; G/274.31G; G/274.31G; G/274.31G; G/274.31G
POWDER, FOR SOLUTION ORAL
COMMUNITY
Start: 2024-01-28

## 2024-03-25 RX ORDER — BUPROPION HYDROCHLORIDE 75 MG/1
150 TABLET ORAL 2 TIMES DAILY
Qty: 400 TABLET | Refills: 3
Start: 2024-03-25

## 2024-03-25 RX ORDER — NYSTATIN 100000 [USP'U]/G
POWDER TOPICAL
COMMUNITY
Start: 2024-01-21 | End: 2024-03-25

## 2024-03-25 RX ORDER — TRIAMTERENE AND HYDROCHLOROTHIAZIDE 37.5; 25 MG/1; MG/1
1 CAPSULE ORAL
COMMUNITY
Start: 2024-03-05

## 2024-03-25 ASSESSMENT — PATIENT HEALTH QUESTIONNAIRE - PHQ9
SUM OF ALL RESPONSES TO PHQ QUESTIONS 1-9: 6
5. POOR APPETITE OR OVEREATING: 2 - MORE THAN HALF THE DAYS
CLINICAL INTERPRETATION OF PHQ2 SCORE: 2

## 2024-03-25 ASSESSMENT — FIBROSIS 4 INDEX: FIB4 SCORE: 1.75

## 2024-03-25 NOTE — ASSESSMENT & PLAN NOTE
Chronic stable problem, continue with follow-up every 6 months to ensure stability, continue medical therapy with levothyroxine 75 mcg daily.

## 2024-03-25 NOTE — PROGRESS NOTES
Subjective:   Chief Complaint/History of Present Illness:  Milton Javed Jr. is a 79 y.o. male established patient who presents today to discuss medical problems as listed below. William is unaccompanied for today's visit.    Problem   Current Mild Episode of Major Depressive Disorder Without Prior Episode (Hcc)    Depression Screening (3/2024)    Little interest or pleasure in doing things?  1 - several days   Feeling down, depressed , or hopeless? 1 - several days   Trouble falling or staying asleep, or sleeping too much?  1 - several days   Feeling tired or having little energy?  1 - several days   Poor appetite or overeating?  2 - more than half the days   Feeling bad about yourself - or that you are a failure or have let yourself or your family down? 0 - not at all   Trouble concentrating on things, such as reading the newspaper or watching television? 0 - not at all   Moving or speaking so slowly that other people could have noticed.  Or the opposite - being so fidgety or restless that you have been moving around a lot more than usual?  0 - not at all   Thoughts that you would be better off dead, or of hurting yourself?  0 - not at all   Patient Health Questionnaire Score: 6     PHQ9=17 in Jan 2024      He reports labile mood with depression starting in June 2019, he was initiated on SSRI therapy at that time with 1 dose increase with resolution of the depression.  No known side effects from therapy, he would like to continue at this time.  Previously was taking what sounds to be a benzodiazepine which made him quite groggy.    On follow-up in January 2024 he reports more apathy and anhedonia, he is having trouble getting motivated to reengage in his hobbies and notes his holiday season has been much harder on him after the loss of his wife.  He has wonderful support from his 2 daughters who live in [place] and [place].    He feels improved with initiation of Wellbutrin at 75 mg twice daily and is agreeable  to increasing dose to 150 mg twice daily.    Current regimen: escitalopram 20 mg daily and wellbutrin 150 mg twice daily     Acquired Hypothyroidism     Latest Reference Range & Units 12/29/23 09:55   TSH 0.380 - 5.330 uIU/mL 2.690   Free T-4 0.93 - 1.70 ng/dL 1.33     Diagnosed based off abnormal labs in the past, notes dose was originally 50 mcg and then increased to 75 mcg. No signs/symptoms of over treatment or undertreatment at this time.    Current regimen: levothyroxine 75 mcg daily     Type 2 Diabetes Mellitus With Microalbuminuria, Without Long-Term Current Use of Insulin (Hcc)     Latest Reference Range & Units 12/29/23 09:55   Glycohemoglobin 4.0 - 5.6 % 6.1 (H)   Estim. Avg Glu mg/dL 128   Micro Alb Creat Ratio 0 - 30 mg/g 40 (H)       Diagnosed in August 2020, treated with metformin, recent deterioration in Summer/Fall 2022 due to his wife's health decline and dietary indiscretion and decreased activity. He developed transient microalbuminuria which has since resolved with losartan and better control of diabetes. No known neuropathy or retinopathy.    Current regimen: metformin ER 1000 mg BID and pioglitazone 30 mg daily     Mixed Hyperlipidemia     Latest Reference Range & Units 12/29/23 09:55   Cholesterol,Tot 100 - 199 mg/dL 137   Triglycerides 0 - 149 mg/dL 105   HDL >=40 mg/dL 45   LDL <100 mg/dL 71     He has longstanding history of elevated cholesterol, start on statin therapy with resultant improvement.    Current regimen: rosuvastatin 20 mg daily and Zetia 10 mg daily          Current Medications:  Current Outpatient Medications Ordered in Epic   Medication Sig Dispense Refill    GAVILYTE-G 236 g Recon Soln MIX AND DRINK AS DIRECTED PER [employer] INSTRUCTION HANDOUT      triamterene/hctz (MAXZIDE-25/DYAZIDE) 37.5-25 MG Cap Take 1 Capsule by mouth every day.      buPROPion (WELLBUTRIN) 75 MG Tab Take 2 Tablets by mouth 2 times a day. 400 Tablet 3    rosuvastatin (CRESTOR) 20 MG Tab Take 1  Tablet by mouth every evening. 100 Tablet 3    ezetimibe (ZETIA) 10 MG Tab Take 1 Tablet by mouth every day. 100 Tablet 3    levothyroxine (SYNTHROID) 75 MCG Tab Take 1 Tablet by mouth every morning on an empty stomach. 100 Tablet 3    metFORMIN ER (GLUCOPHAGE XR) 500 MG TABLET SR 24 HR TAKE 2 TABLETS BY MOUTH TWICE DAILY 400 Tablet 3    losartan (COZAAR) 50 MG Tab TAKE 1 TABLET BY MOUTH EVERY  Tablet 3    escitalopram (LEXAPRO) 20 MG tablet TAKE 1 TABLET BY MOUTH EVERY  Tablet 3    NIFEdipine SR (PROCADIA-XL) 30 MG tablet Take 1 Tablet by mouth every day. 100 Tablet 3    Nystatin Powder 1 Application 4 times a day as needed (skin fold irritation). 1 Each 0    gabapentin (NEURONTIN) 300 MG Cap Take 2 Capsules by mouth 3 times a day. 600 Capsule 3    metoprolol SR (TOPROL XL) 25 MG TABLET SR 24 HR Take 1 Tablet by mouth every day. 100 Tablet 3    pioglitazone (ACTOS) 30 MG Tab Take 1 Tablet by mouth every day. 100 Tablet 3    Blood Glucose Monitoring Suppl Device Meter: Dispense insurance preffered meter. Sig. Use as directed for blood sugar monitoring. 1 Each 0    Blood Glucose Test Strips Test strips order: Test strips for insurance approved meter. Sig: use BID and prn ssx high or low sugar, max 4x/d 100 Strip 3    Lancets Lancets order: Lancets for insurance preffered meter. Sig: use BID and prn ssx high or low sugar Max 4x/day 100 Each 3    oxyCODONE immediate-release (ROXICODONE) 5 MG Tab Take 1 Tablet by mouth every four hours as needed for Severe Pain.      aspirin (ASA) 81 MG Chew Tab chewable tablet Chew 1 Tablet every day.      B Complex Vitamins (VITAMIN B COMPLEX PO) Take  by mouth.      Blood Glucose Monitoring Suppl SUPPLIES Misc Test strips order: Test strips for [employer] Contour Next meter. Sig: use daily for medication adjustment.  Dx. Code E11.65 100 Strip 3    Cholecalciferol (VITAMIN D3 PO) Take  by mouth.      Fish Oil Oil by Does not apply route.      Ascorbic Acid (VITAMIN C) 1000 MG  "Tab Take  by mouth.      albuterol 108 (90 BASE) MCG/ACT Aero Soln inhalation aerosol Inhale 2 Puffs by mouth every 6 hours as needed for Shortness of Breath. 8.5 g 3     No current Epic-ordered facility-administered medications on file.          Objective:   Physical Exam:    Vitals: /40 (BP Location: Right arm, Patient Position: Sitting, BP Cuff Size: Adult)   Pulse 75   Temp (!) 35.7 °C (96.3 °F) (Temporal)   Resp 16   Ht 1.702 m (5' 7\")   Wt (!) 134 kg (296 lb 8 oz)   SpO2 94%    BMI: Body mass index is 46.44 kg/m².  Physical Exam  Constitutional:       General: He is not in acute distress.     Appearance: Normal appearance. He is not ill-appearing.   HENT:      Right Ear: Ear canal normal. There is no impacted cerumen.      Left Ear: Ear canal normal. There is no impacted cerumen.   Eyes:      General: No scleral icterus.     Conjunctiva/sclera: Conjunctivae normal.   Cardiovascular:      Rate and Rhythm: Normal rate and regular rhythm.      Pulses: Normal pulses.   Pulmonary:      Effort: Pulmonary effort is normal. No respiratory distress.      Breath sounds: Normal breath sounds. No wheezing or rhonchi.   Abdominal:      General: Bowel sounds are normal. There is distension.      Palpations: Abdomen is soft.   Skin:     General: Skin is warm and dry.   Psychiatric:         Mood and Affect: Mood normal.         Behavior: Behavior normal.         Thought Content: Thought content normal.         Judgment: Judgment normal.          Assessment and Plan:   Milton is a 79 y.o. male with the following:  Problem List Items Addressed This Visit       Acquired hypothyroidism     Chronic stable problem, continue with follow-up every 6 months to ensure stability, continue medical therapy with levothyroxine 75 mcg daily.         Relevant Orders    FREE THYROXINE    TSH    Current mild episode of major depressive disorder without prior episode (HCC)     Chronic improved problem, PHQ-9 score is improved from 17 " in January 2024 to a score of 6 in March 2024.  He notes improvement with addition of Wellbutrin and is agreeable to increasing dose, increase Wellbutrin from 75 mg twice daily to 150 mg twice daily and continue escitalopram 20 mg daily.  Continue setting small goals for himself to increase activity and work on his hobbies.  Also encouraged him to reach out to his daughters for support.         Relevant Medications    buPROPion (WELLBUTRIN) 75 MG Tab    Mixed hyperlipidemia     Chronic ongoing problem, lipids are stable on current regimen, continue rosuvastatin 20 mg daily and Zetia 10 mg daily.         Relevant Medications    triamterene/hctz (MAXZIDE-25/DYAZIDE) 37.5-25 MG Cap    Other Relevant Orders    VITAMIN B12    VITAMIN D,25 HYDROXY (DEFICIENCY)    Lipid Profile    Comp Metabolic Panel    CBC WITH DIFFERENTIAL    Type 2 diabetes mellitus with microalbuminuria, without long-term current use of insulin ([facility])     Stable problem, A1c very well-controlled at 6.1, still with mild microalbuminuria.  He is on ARB with good control blood pressure, continue losartan 50 mg daily.  Continue medical therapy with metformin 1000 mg twice daily and pioglitazone 30 mg daily.  Update urine microalbumin and A1c before next visit.  Talked about decreasing metformin dose in the future to help with stools.         Relevant Orders    HEMOGLOBIN A1C    MICROALBUMIN CREAT RATIO URINE          RTC: Return in about 3 months (around 6/25/2024).    I spent a total of 34 minutes with record review, exam, communication with the patient, communication with other providers, and documentation of this encounter.    PLEASE NOTE: This dictation was created using voice recognition software. I have made every reasonable attempt to correct obvious errors, but I expect that there are errors of grammar and possibly content that I did not discover before finalizing the note.      Agnes Genao, DO  Geriatric and Internal Medicine  [facility]

## 2024-03-25 NOTE — ASSESSMENT & PLAN NOTE
Chronic ongoing problem, lipids are stable on current regimen, continue rosuvastatin 20 mg daily and Zetia 10 mg daily.

## 2024-03-25 NOTE — ASSESSMENT & PLAN NOTE
Stable problem, A1c very well-controlled at 6.1, still with mild microalbuminuria.  He is on ARB with good control blood pressure, continue losartan 50 mg daily.  Continue medical therapy with metformin 1000 mg twice daily and pioglitazone 30 mg daily.  Update urine microalbumin and A1c before next visit.  Talked about decreasing metformin dose in the future to help with stools.

## 2024-03-25 NOTE — ASSESSMENT & PLAN NOTE
Chronic improved problem, PHQ-9 score is improved from 17 in January 2024 to a score of 6 in March 2024.  He notes improvement with addition of Wellbutrin and is agreeable to increasing dose, increase Wellbutrin from 75 mg twice daily to 150 mg twice daily and continue escitalopram 20 mg daily.  Continue setting small goals for himself to increase activity and work on his hobbies.  Also encouraged him to reach out to his daughters for support.

## 2024-03-26 ENCOUNTER — PATIENT OUTREACH (OUTPATIENT)
Dept: HEALTH INFORMATION MANAGEMENT | Facility: OTHER | Age: 79
End: 2024-03-26
Payer: MEDICARE

## 2024-03-26 DIAGNOSIS — I77.810 DILATED AORTIC ROOT (HCC): ICD-10-CM

## 2024-03-26 DIAGNOSIS — E66.01 CLASS 3 SEVERE OBESITY DUE TO EXCESS CALORIES WITH SERIOUS COMORBIDITY AND BODY MASS INDEX (BMI) OF 45.0 TO 49.9 IN ADULT (HCC): ICD-10-CM

## 2024-03-26 DIAGNOSIS — E11.29 TYPE 2 DIABETES MELLITUS WITH MICROALBUMINURIA, WITHOUT LONG-TERM CURRENT USE OF INSULIN (HCC): ICD-10-CM

## 2024-03-26 DIAGNOSIS — I10 ESSENTIAL HYPERTENSION: ICD-10-CM

## 2024-03-26 DIAGNOSIS — R80.9 TYPE 2 DIABETES MELLITUS WITH MICROALBUMINURIA, WITHOUT LONG-TERM CURRENT USE OF INSULIN (HCC): ICD-10-CM

## 2024-03-26 DIAGNOSIS — N18.31 STAGE 3A CHRONIC KIDNEY DISEASE: ICD-10-CM

## 2024-03-26 NOTE — PROGRESS NOTES
I spoke with the patient this morning over the phone. I introduced the PCM program. The patient feels well supported by his PCP at this time and that he has the acceptable access to them through [employer]. Patient is aware that he can contact us should his situation change.

## 2024-03-27 NOTE — ASSESSMENT & PLAN NOTE
Patient's blood pressures have been running quite low.  We had stopped his Maxide dose in half but he continues to have low blood pressures.  He is having dizziness with this.  We decreased his losartan to 25 mg daily but his symptoms persist.  It is usually with standing after sitting for quite some time or getting out of bed.   No

## 2024-04-02 DIAGNOSIS — E11.29 TYPE 2 DIABETES MELLITUS WITH MICROALBUMINURIA, WITHOUT LONG-TERM CURRENT USE OF INSULIN (HCC): ICD-10-CM

## 2024-04-02 DIAGNOSIS — R80.9 TYPE 2 DIABETES MELLITUS WITH MICROALBUMINURIA, WITHOUT LONG-TERM CURRENT USE OF INSULIN (HCC): ICD-10-CM

## 2024-04-02 RX ORDER — PIOGLITAZONEHYDROCHLORIDE 30 MG/1
30 TABLET ORAL DAILY
Qty: 100 TABLET | Refills: 3 | Status: SHIPPED | OUTPATIENT
Start: 2024-04-02

## 2024-04-02 NOTE — TELEPHONE ENCOUNTER
Received request via: Patient    Was the patient seen in the last year in this department? Yes    Does the patient have an active prescription (recently filled or refills available) for medication(s) requested? No    Pharmacy Name: [facility]    Does the patient have [facility] and need 100 day supply (blood pressure, diabetes and cholesterol meds only)? Yes, quantity updated to 100 days

## 2024-04-03 DIAGNOSIS — I10 ESSENTIAL HYPERTENSION: ICD-10-CM

## 2024-04-03 RX ORDER — METOPROLOL SUCCINATE 25 MG/1
25 TABLET, EXTENDED RELEASE ORAL
Qty: 100 TABLET | Refills: 3 | Status: SHIPPED | OUTPATIENT
Start: 2024-04-03

## 2024-04-03 NOTE — TELEPHONE ENCOUNTER
Received request via: Pharmacy    Was the patient seen in the last year in this department? Yes    Does the patient have an active prescription (recently filled or refills available) for medication(s) requested? No    [facility] Name[facility]    Does the patient have assisted Plus and need 100 day supply (blood pressure, diabetes and cholesterol meds only)? Yes, quantity updated to 100 days

## 2024-05-02 ENCOUNTER — PATIENT MESSAGE (OUTPATIENT)
Dept: MEDICAL GROUP | Facility: PHYSICIAN GROUP | Age: 79
End: 2024-05-02
Payer: MEDICARE

## 2024-05-02 RX ORDER — BUPROPION HYDROCHLORIDE 200 MG/1
200 TABLET, EXTENDED RELEASE ORAL 2 TIMES DAILY
Qty: 60 TABLET | Refills: 3 | Status: SHIPPED | OUTPATIENT
Start: 2024-05-02

## 2024-05-02 RX ORDER — BUPROPION HYDROCHLORIDE 200 MG/1
200 TABLET, EXTENDED RELEASE ORAL 2 TIMES DAILY
COMMUNITY
End: 2024-05-02 | Stop reason: SDUPTHER

## 2024-05-16 ENCOUNTER — OFFICE VISIT (OUTPATIENT)
Dept: MEDICAL GROUP | Facility: PHYSICIAN GROUP | Age: 79
End: 2024-05-16
Payer: MEDICARE

## 2024-05-16 ENCOUNTER — HOSPITAL ENCOUNTER (EMERGENCY)
Facility: MEDICAL CENTER | Age: 79
End: 2024-05-16
Attending: EMERGENCY MEDICINE
Payer: MEDICARE

## 2024-05-16 ENCOUNTER — APPOINTMENT (OUTPATIENT)
Dept: RADIOLOGY | Facility: MEDICAL CENTER | Age: 79
End: 2024-05-16
Attending: EMERGENCY MEDICINE
Payer: MEDICARE

## 2024-05-16 VITALS
BODY MASS INDEX: 43.59 KG/M2 | HEART RATE: 64 BPM | HEIGHT: 69 IN | TEMPERATURE: 98 F | OXYGEN SATURATION: 96 % | RESPIRATION RATE: 18 BRPM | SYSTOLIC BLOOD PRESSURE: 134 MMHG | DIASTOLIC BLOOD PRESSURE: 69 MMHG | WEIGHT: 294.31 LBS

## 2024-05-16 VITALS
HEIGHT: 67 IN | SYSTOLIC BLOOD PRESSURE: 120 MMHG | WEIGHT: 294.6 LBS | DIASTOLIC BLOOD PRESSURE: 60 MMHG | HEART RATE: 70 BPM | TEMPERATURE: 97.5 F | OXYGEN SATURATION: 96 % | BODY MASS INDEX: 46.24 KG/M2

## 2024-05-16 DIAGNOSIS — R10.32 LEFT LOWER QUADRANT ABDOMINAL PAIN: ICD-10-CM

## 2024-05-16 DIAGNOSIS — Z01.810 PREOP CARDIOVASCULAR EXAM: ICD-10-CM

## 2024-05-16 PROBLEM — C61 MALIGNANT TUMOR OF PROSTATE (HCC): Status: RESOLVED | Noted: 2023-09-06 | Resolved: 2024-05-16

## 2024-05-16 PROBLEM — K57.92 DIVERTICULITIS: Status: ACTIVE | Noted: 2024-05-16

## 2024-05-16 LAB
ALBUMIN SERPL BCP-MCNC: 4.4 G/DL (ref 3.2–4.9)
ALBUMIN/GLOB SERPL: 1.6 G/DL
ALP SERPL-CCNC: 62 U/L (ref 30–99)
ALT SERPL-CCNC: 18 U/L (ref 2–50)
ANION GAP SERPL CALC-SCNC: 12 MMOL/L (ref 7–16)
APPEARANCE UR: CLEAR
AST SERPL-CCNC: 19 U/L (ref 12–45)
BACTERIA #/AREA URNS HPF: NEGATIVE /HPF
BASOPHILS # BLD AUTO: 0.4 % (ref 0–1.8)
BASOPHILS # BLD: 0.02 K/UL (ref 0–0.12)
BILIRUB SERPL-MCNC: 0.4 MG/DL (ref 0.1–1.5)
BILIRUB UR QL STRIP.AUTO: NEGATIVE
BUN SERPL-MCNC: 28 MG/DL (ref 8–22)
CALCIUM ALBUM COR SERPL-MCNC: 9 MG/DL (ref 8.5–10.5)
CALCIUM SERPL-MCNC: 9.3 MG/DL (ref 8.4–10.2)
CHLORIDE SERPL-SCNC: 108 MMOL/L (ref 96–112)
CO2 SERPL-SCNC: 19 MMOL/L (ref 20–33)
COLOR UR: ABNORMAL
CREAT SERPL-MCNC: 1.19 MG/DL (ref 0.5–1.4)
EOSINOPHIL # BLD AUTO: 0.25 K/UL (ref 0–0.51)
EOSINOPHIL NFR BLD: 5.2 % (ref 0–6.9)
EPI CELLS #/AREA URNS HPF: NEGATIVE /HPF
ERYTHROCYTE [DISTWIDTH] IN BLOOD BY AUTOMATED COUNT: 50.4 FL (ref 35.9–50)
GFR SERPLBLD CREATININE-BSD FMLA CKD-EPI: 63 ML/MIN/1.73 M 2
GLOBULIN SER CALC-MCNC: 2.7 G/DL (ref 1.9–3.5)
GLUCOSE SERPL-MCNC: 136 MG/DL (ref 65–99)
GLUCOSE UR STRIP.AUTO-MCNC: NEGATIVE MG/DL
HCT VFR BLD AUTO: 38.4 % (ref 42–52)
HGB BLD-MCNC: 12.9 G/DL (ref 14–18)
IMM GRANULOCYTES # BLD AUTO: 0.01 K/UL (ref 0–0.11)
IMM GRANULOCYTES NFR BLD AUTO: 0.2 % (ref 0–0.9)
KETONES UR STRIP.AUTO-MCNC: NEGATIVE MG/DL
LEUKOCYTE ESTERASE UR QL STRIP.AUTO: NEGATIVE
LIPASE SERPL-CCNC: 32 U/L (ref 11–82)
LYMPHOCYTES # BLD AUTO: 0.73 K/UL (ref 1–4.8)
LYMPHOCYTES NFR BLD: 15.2 % (ref 22–41)
MCH RBC QN AUTO: 35.5 PG (ref 27–33)
MCHC RBC AUTO-ENTMCNC: 33.6 G/DL (ref 32.3–36.5)
MCV RBC AUTO: 105.8 FL (ref 81.4–97.8)
MICRO URNS: ABNORMAL
MONOCYTES # BLD AUTO: 0.37 K/UL (ref 0–0.85)
MONOCYTES NFR BLD AUTO: 7.7 % (ref 0–13.4)
MUCOUS THREADS #/AREA URNS HPF: ABNORMAL /HPF
NEUTROPHILS # BLD AUTO: 3.43 K/UL (ref 1.82–7.42)
NEUTROPHILS NFR BLD: 71.3 % (ref 44–72)
NITRITE UR QL STRIP.AUTO: NEGATIVE
NRBC # BLD AUTO: 0 K/UL
NRBC BLD-RTO: 0 /100 WBC (ref 0–0.2)
PH UR STRIP.AUTO: 5.5 [PH] (ref 5–8)
PLATELET # BLD AUTO: 169 K/UL (ref 164–446)
PMV BLD AUTO: 9.3 FL (ref 9–12.9)
POTASSIUM SERPL-SCNC: 5 MMOL/L (ref 3.6–5.5)
PROT SERPL-MCNC: 7.1 G/DL (ref 6–8.2)
PROT UR QL STRIP: NEGATIVE MG/DL
RBC # BLD AUTO: 3.63 M/UL (ref 4.7–6.1)
RBC # URNS HPF: ABNORMAL /HPF
RBC UR QL AUTO: ABNORMAL
SODIUM SERPL-SCNC: 139 MMOL/L (ref 135–145)
SP GR UR STRIP.AUTO: 1.01
UNIDENT CRYS URNS QL MICRO: ABNORMAL /HPF
WBC # BLD AUTO: 4.8 K/UL (ref 4.8–10.8)
WBC #/AREA URNS HPF: ABNORMAL /HPF

## 2024-05-16 PROCEDURE — 3078F DIAST BP <80 MM HG: CPT | Performed by: STUDENT IN AN ORGANIZED HEALTH CARE EDUCATION/TRAINING PROGRAM

## 2024-05-16 PROCEDURE — 3074F SYST BP LT 130 MM HG: CPT | Performed by: STUDENT IN AN ORGANIZED HEALTH CARE EDUCATION/TRAINING PROGRAM

## 2024-05-16 PROCEDURE — 99214 OFFICE O/P EST MOD 30 MIN: CPT | Performed by: STUDENT IN AN ORGANIZED HEALTH CARE EDUCATION/TRAINING PROGRAM

## 2024-05-16 RX ADMIN — IOHEXOL 100 ML: 350 INJECTION, SOLUTION INTRAVENOUS at 15:17

## 2024-05-16 ASSESSMENT — PAIN DESCRIPTION - DESCRIPTORS: DESCRIPTORS: ACHING

## 2024-05-16 ASSESSMENT — ENCOUNTER SYMPTOMS
NAUSEA: 0
BLOOD IN STOOL: 0
SHORTNESS OF BREATH: 0
WEAKNESS: 0
FOCAL WEAKNESS: 0
SENSORY CHANGE: 0
SPEECH CHANGE: 0
PALPITATIONS: 0
ABDOMINAL PAIN: 1
FEVER: 0
DIZZINESS: 0
CHILLS: 0

## 2024-05-16 ASSESSMENT — FIBROSIS 4 INDEX
FIB4 SCORE: 1.75
FIB4 SCORE: 1.75
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
## 2024-05-16 NOTE — PROGRESS NOTES
"CC:  Diagnoses of Left lower quadrant abdominal pain and Preop cardiovascular exam were pertinent to this visit.    HISTORY OF THE PRESENT ILLNESS: Patient is a 79 y.o. male. This pleasant patient is here today for follow up visit.    Problem   Malignant Tumor of Prostate (Hcc) (Resolved)    He has history of prostate cancer and is currently on active surveillance, most recent PSA level is reassuring.       A week ago, started having left lower abdominal pain.  Feels like diverticulitis that he had before. Last episode was a couple years ago. Never had colonoscopy after that because GI wanted cardiac clearance from cardiology.  No fever or chills.  The pain was getting better and then it got worse.  Was not doing liquid diet when this started.  Was not treated with antibiotic recently.      ROS:   Review of Systems   Constitutional:  Negative for chills and fever.   HENT:  Negative for ear discharge and ear pain.    Respiratory:  Negative for shortness of breath.    Cardiovascular:  Negative for chest pain and palpitations.   Gastrointestinal:  Positive for abdominal pain. Negative for blood in stool, melena and nausea.   Neurological:  Negative for dizziness, sensory change, speech change, focal weakness and weakness.       /60 (BP Location: Left arm, Patient Position: Sitting, BP Cuff Size: Adult)   Pulse 70   Temp 36.4 °C (97.5 °F) (Temporal)   Ht 1.702 m (5' 7\")   Wt (!) 134 kg (294 lb 9.6 oz)   SpO2 96%   BMI 46.14 kg/m² Body mass index is 46.14 kg/m².    Physical Exam  Constitutional:       General: He is not in acute distress.     Appearance: Normal appearance. He is normal weight. He is not diaphoretic.   HENT:      Head: Normocephalic and atraumatic.      Right Ear: External ear normal.      Left Ear: External ear normal.      Nose: No rhinorrhea.      Mouth/Throat:      Pharynx: No oropharyngeal exudate.   Eyes:      General: No scleral icterus.        Right eye: No discharge.         Left eye: " No discharge.      Extraocular Movements: Extraocular movements intact.      Conjunctiva/sclera: Conjunctivae normal.   Cardiovascular:      Rate and Rhythm: Normal rate and regular rhythm.      Heart sounds: Normal heart sounds. No murmur heard.  Pulmonary:      Effort: Pulmonary effort is normal. No respiratory distress.      Breath sounds: Normal breath sounds. No wheezing or rales.   Abdominal:      General: Abdomen is flat. Bowel sounds are normal. There is no distension.      Palpations: Abdomen is soft.      Tenderness: There is no abdominal tenderness. There is no guarding or rebound.      Comments: Pt denies tenderness in abdomen   Musculoskeletal:         General: Normal range of motion.      Cervical back: Neck supple. No tenderness.      Right lower leg: No edema.      Left lower leg: No edema.   Skin:     Capillary Refill: Capillary refill takes less than 2 seconds.      Coloration: Skin is not jaundiced.      Findings: No rash.   Neurological:      General: No focal deficit present.      Mental Status: Mental status is at baseline.      Motor: No weakness.      Gait: Gait normal.   Psychiatric:         Mood and Affect: Mood normal.         Behavior: Behavior normal.         Thought Content: Thought content normal.         Judgment: Judgment normal.           Note: I have reviewed all pertinent labs and diagnostic tests associated with this visit with specific comments listed under the assessment and plan below    Assessment and Plan  79 y.o. male with the following -  This patient is presenting with acute left lower abdominal pain for 1 week, concerning for diverticulitis    I sent patient to emergency department for ct abdomen/pelvis with iv and oral contrast. He needs labs prior to the CT scan because of the contrast and no recent lab was done.  It is unclear why the abdominal pain in left lower region got worse after 1 week. He did not do liquid diet which may be partially causing it but the pain  was getting better and then got worse, which is unusual. Even without antibiotic, mild diverticulitis improves and then goes away in  usually 1 week, so I am wondering about possible complication from diverticulitis.   No sign of acute abdomen on exam.  No prior imaging available that established diverticulitis diagnosis in the past, although the prior episode a couple years ago was consistent with diverticulitis in terms of clinical picture.     Never had colonoscopy after that because GI wanted cardiac clearance from cardiology. I placed referral to [facility] cardiology again so that he can be seen for preop cardiovascular examination since GI requested it. I also gave patient number to call to make appointment with cardiology.    HCC Gap Form    Last edited 05/16/24 09:45 PDT by James Manning M.D.         I spent a total of 36 minutes with record review, exam, communication with the patient, communication with other providers, and documentation of this encounter.    Return if symptoms worsen or fail to improve.  Follow up immediately after discharged from hospital    Please note that this dictation was created using voice recognition software. I have made every reasonable attempt to correct obvious errors, but I expect that there are errors of grammar and possibly content that I did not discover before finalizing the note.

## 2024-05-16 NOTE — ED NOTES
Pt states he's been having symptoms of diverticulitis. LLQ dull ache. Pt was seen by PCP this morning and was sent here because the doctor would like a CT scan and blood work.

## 2024-05-16 NOTE — ED TRIAGE NOTES
Chief Complaint   Patient presents with    Abdominal Pain     LLQ pain which feels according to patient like episodes he has had in the past of diverticulitis x 1 week.    Went to PCP today and was told to come to ED.

## 2024-05-16 NOTE — PATIENT INSTRUCTIONS
-Go to [facility] emergency department at [facility].  -I placed referral to [facility] cardiology for you.  -Please call this number to make appointment with [facility] cardiology: [phone].

## 2024-05-17 NOTE — DISCHARGE INSTRUCTIONS
Workup today shows no evidence of intra-abdominal infection.  Please see your doctor next week for recheck if not better.  If worse, fever, bleeding, uncontrollable pain, return to emergency department for recheck.

## 2024-06-04 ENCOUNTER — TELEPHONE (OUTPATIENT)
Dept: HEALTH INFORMATION MANAGEMENT | Facility: OTHER | Age: 79
End: 2024-06-04
Payer: MEDICARE

## 2024-06-13 ENCOUNTER — RESEARCH ENCOUNTER (OUTPATIENT)
Dept: RESEARCH | Facility: MEDICAL CENTER | Age: 79
End: 2024-06-13
Payer: MEDICARE

## 2024-06-13 DIAGNOSIS — Z00.6 RESEARCH STUDY PATIENT: ICD-10-CM

## 2024-06-13 NOTE — RESEARCH NOTE
Patient responded to [employer];     Confirmed with the participant which designated provider they would like study results shared with. Patient will have an opportunity to share the results with any providers of their choosing in the future by accessing their results from [employer].

## 2024-06-19 ENCOUNTER — HOSPITAL ENCOUNTER (OUTPATIENT)
Dept: LAB | Facility: MEDICAL CENTER | Age: 79
End: 2024-06-19
Attending: INTERNAL MEDICINE
Payer: MEDICARE

## 2024-06-19 ENCOUNTER — HOSPITAL ENCOUNTER (OUTPATIENT)
Dept: LAB | Facility: MEDICAL CENTER | Age: 79
End: 2024-06-19
Attending: INTERNAL MEDICINE

## 2024-06-19 DIAGNOSIS — Z00.6 RESEARCH STUDY PATIENT: ICD-10-CM

## 2024-06-19 DIAGNOSIS — R80.9 TYPE 2 DIABETES MELLITUS WITH MICROALBUMINURIA, WITHOUT LONG-TERM CURRENT USE OF INSULIN (HCC): ICD-10-CM

## 2024-06-19 DIAGNOSIS — E78.2 MIXED HYPERLIPIDEMIA: ICD-10-CM

## 2024-06-19 DIAGNOSIS — E11.29 TYPE 2 DIABETES MELLITUS WITH MICROALBUMINURIA, WITHOUT LONG-TERM CURRENT USE OF INSULIN (HCC): ICD-10-CM

## 2024-06-19 DIAGNOSIS — E03.9 ACQUIRED HYPOTHYROIDISM: ICD-10-CM

## 2024-06-19 LAB
25(OH)D3 SERPL-MCNC: 54 NG/ML (ref 30–100)
ALBUMIN SERPL BCP-MCNC: 4 G/DL (ref 3.2–4.9)
ALBUMIN/GLOB SERPL: 1.7 G/DL
ALP SERPL-CCNC: 55 U/L (ref 30–99)
ALT SERPL-CCNC: 24 U/L (ref 2–50)
ANION GAP SERPL CALC-SCNC: 12 MMOL/L (ref 7–16)
AST SERPL-CCNC: 24 U/L (ref 12–45)
BASOPHILS # BLD AUTO: 1.1 % (ref 0–1.8)
BASOPHILS # BLD: 0.04 K/UL (ref 0–0.12)
BILIRUB SERPL-MCNC: 0.5 MG/DL (ref 0.1–1.5)
BUN SERPL-MCNC: 19 MG/DL (ref 8–22)
CALCIUM ALBUM COR SERPL-MCNC: 9.2 MG/DL (ref 8.5–10.5)
CALCIUM SERPL-MCNC: 9.2 MG/DL (ref 8.5–10.5)
CHLORIDE SERPL-SCNC: 108 MMOL/L (ref 96–112)
CHOLEST SERPL-MCNC: 115 MG/DL (ref 100–199)
CO2 SERPL-SCNC: 18 MMOL/L (ref 20–33)
CREAT SERPL-MCNC: 1.34 MG/DL (ref 0.5–1.4)
EOSINOPHIL # BLD AUTO: 0.58 K/UL (ref 0–0.51)
EOSINOPHIL NFR BLD: 15.3 % (ref 0–6.9)
ERYTHROCYTE [DISTWIDTH] IN BLOOD BY AUTOMATED COUNT: 47.6 FL (ref 35.9–50)
EST. AVERAGE GLUCOSE BLD GHB EST-MCNC: 120 MG/DL
GFR SERPLBLD CREATININE-BSD FMLA CKD-EPI: 54 ML/MIN/1.73 M 2
GLOBULIN SER CALC-MCNC: 2.4 G/DL (ref 1.9–3.5)
GLUCOSE SERPL-MCNC: 112 MG/DL (ref 65–99)
HBA1C MFR BLD: 5.8 % (ref 4–5.6)
HCT VFR BLD AUTO: 34.5 % (ref 42–52)
HDLC SERPL-MCNC: 38 MG/DL
HGB BLD-MCNC: 11.6 G/DL (ref 14–18)
IMM GRANULOCYTES # BLD AUTO: 0.01 K/UL (ref 0–0.11)
IMM GRANULOCYTES NFR BLD AUTO: 0.3 % (ref 0–0.9)
LDLC SERPL CALC-MCNC: 58 MG/DL
LYMPHOCYTES # BLD AUTO: 0.69 K/UL (ref 1–4.8)
LYMPHOCYTES NFR BLD: 18.3 % (ref 22–41)
MCH RBC QN AUTO: 34.4 PG (ref 27–33)
MCHC RBC AUTO-ENTMCNC: 33.6 G/DL (ref 32.3–36.5)
MCV RBC AUTO: 102.4 FL (ref 81.4–97.8)
MONOCYTES # BLD AUTO: 0.31 K/UL (ref 0–0.85)
MONOCYTES NFR BLD AUTO: 8.2 % (ref 0–13.4)
NEUTROPHILS # BLD AUTO: 2.15 K/UL (ref 1.82–7.42)
NEUTROPHILS NFR BLD: 56.8 % (ref 44–72)
NRBC # BLD AUTO: 0 K/UL
NRBC BLD-RTO: 0 /100 WBC (ref 0–0.2)
PLATELET # BLD AUTO: 173 K/UL (ref 164–446)
PMV BLD AUTO: 9.9 FL (ref 9–12.9)
POTASSIUM SERPL-SCNC: 4.7 MMOL/L (ref 3.6–5.5)
PROT SERPL-MCNC: 6.4 G/DL (ref 6–8.2)
RBC # BLD AUTO: 3.37 M/UL (ref 4.7–6.1)
SODIUM SERPL-SCNC: 138 MMOL/L (ref 135–145)
T4 FREE SERPL-MCNC: 1.36 NG/DL (ref 0.93–1.7)
TRIGL SERPL-MCNC: 93 MG/DL (ref 0–149)
TSH SERPL DL<=0.005 MIU/L-ACNC: 3.14 UIU/ML (ref 0.38–5.33)
VIT B12 SERPL-MCNC: 575 PG/ML (ref 211–911)
WBC # BLD AUTO: 3.8 K/UL (ref 4.8–10.8)

## 2024-06-19 PROCEDURE — 83036 HEMOGLOBIN GLYCOSYLATED A1C: CPT

## 2024-06-19 PROCEDURE — 84443 ASSAY THYROID STIM HORMONE: CPT

## 2024-06-19 PROCEDURE — 80053 COMPREHEN METABOLIC PANEL: CPT

## 2024-06-19 PROCEDURE — 80061 LIPID PANEL: CPT

## 2024-06-19 PROCEDURE — 82607 VITAMIN B-12: CPT

## 2024-06-19 PROCEDURE — 85025 COMPLETE CBC W/AUTO DIFF WBC: CPT

## 2024-06-19 PROCEDURE — 82306 VITAMIN D 25 HYDROXY: CPT

## 2024-06-19 PROCEDURE — 36415 COLL VENOUS BLD VENIPUNCTURE: CPT

## 2024-06-19 PROCEDURE — 84439 ASSAY OF FREE THYROXINE: CPT

## 2024-06-25 LAB
ELF SCORE: 10.03 - (ref 9.8–11.3)
HA (HYALURONIC ACID): 110.22 NG/ML
PIIINP (AMINO-TERMINAL PROPEPTIDE): 9.57 NG/ML
RELATIVE RISK: ABNORMAL
RISK GROUP: ABNORMAL
RISK: 23.6 %
TIMP-1 (TISSUE INHIBITOR OF MMP1): 183 NG/ML

## 2024-06-26 ENCOUNTER — OFFICE VISIT (OUTPATIENT)
Dept: MEDICAL GROUP | Facility: PHYSICIAN GROUP | Age: 79
End: 2024-06-26
Payer: MEDICARE

## 2024-06-26 VITALS
BODY MASS INDEX: 41.19 KG/M2 | TEMPERATURE: 97.4 F | OXYGEN SATURATION: 96 % | DIASTOLIC BLOOD PRESSURE: 60 MMHG | HEIGHT: 69 IN | SYSTOLIC BLOOD PRESSURE: 116 MMHG | HEART RATE: 72 BPM | WEIGHT: 278.1 LBS

## 2024-06-26 DIAGNOSIS — F32.0 CURRENT MILD EPISODE OF MAJOR DEPRESSIVE DISORDER WITHOUT PRIOR EPISODE (HCC): ICD-10-CM

## 2024-06-26 DIAGNOSIS — I10 ESSENTIAL HYPERTENSION: ICD-10-CM

## 2024-06-26 DIAGNOSIS — R80.9 TYPE 2 DIABETES MELLITUS WITH MICROALBUMINURIA, WITHOUT LONG-TERM CURRENT USE OF INSULIN (HCC): ICD-10-CM

## 2024-06-26 DIAGNOSIS — N18.31 STAGE 3A CHRONIC KIDNEY DISEASE: ICD-10-CM

## 2024-06-26 DIAGNOSIS — D50.9 IRON DEFICIENCY ANEMIA, UNSPECIFIED IRON DEFICIENCY ANEMIA TYPE: ICD-10-CM

## 2024-06-26 DIAGNOSIS — E11.29 TYPE 2 DIABETES MELLITUS WITH MICROALBUMINURIA, WITHOUT LONG-TERM CURRENT USE OF INSULIN (HCC): ICD-10-CM

## 2024-06-26 DIAGNOSIS — E66.01 CLASS 3 SEVERE OBESITY DUE TO EXCESS CALORIES WITH SERIOUS COMORBIDITY AND BODY MASS INDEX (BMI) OF 40.0 TO 44.9 IN ADULT (HCC): ICD-10-CM

## 2024-06-26 DIAGNOSIS — E03.9 ACQUIRED HYPOTHYROIDISM: ICD-10-CM

## 2024-06-26 PROCEDURE — 3078F DIAST BP <80 MM HG: CPT | Performed by: INTERNAL MEDICINE

## 2024-06-26 PROCEDURE — 3074F SYST BP LT 130 MM HG: CPT | Performed by: INTERNAL MEDICINE

## 2024-06-26 PROCEDURE — 99214 OFFICE O/P EST MOD 30 MIN: CPT | Performed by: INTERNAL MEDICINE

## 2024-06-26 PROCEDURE — G2211 COMPLEX E/M VISIT ADD ON: HCPCS | Performed by: INTERNAL MEDICINE

## 2024-06-26 RX ORDER — METOPROLOL SUCCINATE 25 MG/1
25 TABLET, EXTENDED RELEASE ORAL
Qty: 100 TABLET | Refills: 3
Start: 2024-06-26

## 2024-06-26 RX ORDER — METFORMIN HYDROCHLORIDE 500 MG/1
500-1000 TABLET, EXTENDED RELEASE ORAL 2 TIMES DAILY
Qty: 400 TABLET | Refills: 3 | Status: SHIPPED | OUTPATIENT
Start: 2024-06-26

## 2024-06-26 RX ORDER — BUPROPION HYDROCHLORIDE 200 MG/1
200 TABLET, EXTENDED RELEASE ORAL 2 TIMES DAILY
Qty: 200 TABLET | Refills: 3 | Status: SHIPPED | OUTPATIENT
Start: 2024-06-26

## 2024-06-26 RX ORDER — TRIAMTERENE AND HYDROCHLOROTHIAZIDE 37.5; 25 MG/1; MG/1
1 CAPSULE ORAL
Qty: 100 CAPSULE | Refills: 3 | Status: SHIPPED | OUTPATIENT
Start: 2024-06-26

## 2024-06-26 ASSESSMENT — PATIENT HEALTH QUESTIONNAIRE - PHQ9
SUM OF ALL RESPONSES TO PHQ QUESTIONS 1-9: 5
5. POOR APPETITE OR OVEREATING: 0 - NOT AT ALL
CLINICAL INTERPRETATION OF PHQ2 SCORE: 1

## 2024-06-26 ASSESSMENT — FIBROSIS 4 INDEX: FIB4 SCORE: 2.18

## 2024-06-26 NOTE — ASSESSMENT & PLAN NOTE
Chronic and ongoing problem, requires medication adjustment, will have them reduce metoprolol to half a tablet for 1 week and then hold, monitor for palpitations.  Feels that this is contributing to significant fatigue so we will test this today before he meets with cardiology in early August.  Continue losartan 50 mg daily, nifedipine 30 mg daily, and triamterene-hydrochlorothiazide 37.5-25 mg daily.

## 2024-06-26 NOTE — ASSESSMENT & PLAN NOTE
Chronic and ongoing problem, likely related to hypertension and diabetes, continue with periodic monitoring to ensure stability due to nephrotoxic medications. Check SPEP.

## 2024-06-26 NOTE — ASSESSMENT & PLAN NOTE
Chronic stable problem, labs are appropriate, continue medical therapy with levothyroxine 75 mcg daily.

## 2024-06-26 NOTE — ASSESSMENT & PLAN NOTE
Chronic and improved problem, reduce metformin to 500 mg in the morning and 1000 mg in the evening and if blood sugars continue to be well-controlled and can also reduce evening dose to 1 pill, continue pioglitazone 30 mg daily at this time.

## 2024-06-26 NOTE — PATIENT INSTRUCTIONS
Break metoprolol in 1/2 for 7 days (25 mg -> 12.5 mg) and then can hold all together for the next 4-5 weeks to see if you feel improved regarding energy without it and if so we can look into alternative options  Reduce metformin from 2 pills twice daily to 1 pill in the AM and 2 pills in the PM

## 2024-06-26 NOTE — PROGRESS NOTES
Subjective:   Chief Complaint/History of Present Illness:  Milton Javed Jr. is a 79 y.o. male established patient who presents today to discuss medical problems as listed below. Milton is unaccompanied for today's visit.    History of Present Illness  The patient presents for evaluation of multiple medical concerns.    The patient initiated a ketogenic diet approximately three weeks ago, which resulted in a keto flu due to inadequate carbohydrate intake, excluding bell pepper. Currently, he is making efforts to reduce his carbohydrate intake, eliminating potatoes, and consuming smaller portions of food. His daily diet includes oatmeal.    The patient denies experiencing any hemorrhoidal bleeding, hematuria, or epistaxis. However, he does report occasional severe nosebleeds. He has a history of seasonal allergies, which are not as severe as they were during his youth. He occasionally takes Sudafed or Claritin for symptom management.     He was previously on iron supplements, but was advised to discontinue them. A colonoscopy was scheduled, but required clearance from his cardiologist the day prior to the procedure. His next appointment with Dr. Lr is scheduled for 08/09/2023. He consumes blueberries every morning.    The patient is currently on metoprolol, which he believes is causing him to feel unwell. He denies any history of tachycardia or arrhythmia.    The patient expresses a desire to reduce his metformin dosage. He is currently taking 2 pills twice daily.    The patient reports daily nausea, which he believes is triggered by medication intake. He experienced dry heaves twice after taking his medications on an empty stomach. His bowel movements are regular, and he consumes a significant amount of vegetables. He suspects his nausea may be related to his sinus drainage. He occasionally wakes up feeling nauseous. He used to take Pepcid in the past. He takes Imodium for diarrhea.   He has never  smoked.        Problem   Iron Deficiency Anemia    He has longstanding anemia, likely anemia of chronic kidney disease or related to prior treatment state cancer however he has not had iron levels checked in recent time.  Reports periodic epistaxis and more rare episodes of melena or hematochezia which he relates to the foods he eats creating dark stool and/or hemorrhoidal bleeding.     Stage 3a Chronic Kidney Disease      Mild chronic kidney disease, likely multifactorial.     Class 3 Severe Obesity Due to Excess Calories With Serious Comorbidity and Body Mass Index (Bmi) of 40.0 to 44.9 in Adult ([facility])    He has BMI of 41.07 with a weight of 278 lb, this is a 16 lb loss since our last visit. He attributes this to eating smaller portions and less carbohydrates.     Current Mild Episode of Major Depressive Disorder Without Prior Episode ([facility])    PHQ9=17 in Jan 2024  PHQ9= 6 in March 2024  PHQ9=5 in June 2024      He reports labile mood with depression starting in June 2019, he was initiated on SSRI therapy at that time with 1 dose increase with resolution of the depression.  No known side effects from therapy, he would like to continue at this time.  Previously was taking what sounds to be a benzodiazepine which made him quite groggy.    On follow-up in January 2024 he reports more apathy and anhedonia, he is having trouble getting motivated to reengage in his hobbies and notes his holiday season has been much harder on him after the loss of his wife.  He has wonderful support from his 2 daughters who live in [place] and [place].    He feels improved with initiation of Wellbutrin at 75 mg twice daily and is agreeable to increasing dose to 150 mg twice daily.    Current regimen: escitalopram 20 mg daily and wellbutrin 200 mg twice daily     Essential Hypertension    He has history of hypertension but more recently has struggled with orthostasis with reduction of several medicines. He follows with cardiology,   [facility].    Feels like his metoprolol is causing significant fatigue and would like to do a trial off of it for a few weeks to see if there is a difference.    Current regimen: losartan 50 mg daily, nifedipine 30 mg daily, and triamterene-hydrochlorothiazide 37.5-25 mg daily     Acquired Hypothyroidism     Latest Reference Range & Units 06/19/24 10:21   TSH 0.380 - 5.330 uIU/mL 3.140   Free T-4 0.93 - 1.70 ng/dL 1.36     Diagnosed based off abnormal labs in the past, notes dose was originally 50 mcg and then increased to 75 mcg. No signs/symptoms of over treatment or undertreatment at this time.    Current regimen: levothyroxine 75 mcg daily     Type 2 Diabetes Mellitus With Microalbuminuria, Without Long-Term Current Use of Insulin (Hcc)    Micro Alb Creat Ratio 0 - 30 mg/g 40 (H)      Latest Reference Range & Units 06/19/24 10:21   Glycohemoglobin 4.0 - 5.6 % 5.8 (H)   Estim. Avg Glu mg/dL 120     Diagnosed in August 2020, treated with metformin, recent deterioration in Summer/Fall 2022 due to his wife's health decline and dietary indiscretion and decreased activity. He developed transient microalbuminuria which has since resolved with losartan and better control of diabetes. No known neuropathy or retinopathy.  Once he doing better in late June 2024, will start reducing metformin dose.    Current regimen: metformin  mg in AM and 1000 mg in PM and pioglitazone 30 mg daily          Current Medications:  Current Outpatient Medications Ordered in Epic   Medication Sig Dispense Refill    metFORMIN ER (GLUCOPHAGE XR) 500 MG TABLET SR 24 HR Take 1-2 Tablets by mouth 2 times a day. 1 in the AM and 2 in the  Tablet 3    metoprolol SR (TOPROL XL) 25 MG TABLET SR 24 HR Take 1 Tablet by mouth 1 time a day as needed (palpitations). 100 Tablet 3    triamterene/hctz (MAXZIDE-25/DYAZIDE) 37.5-25 MG Cap Take 1 Capsule by mouth every day. 100 Capsule 3    buPROPion (WELLBUTRIN SR) 200 MG SR tablet Take 1 Tablet by  mouth 2 times a day. 200 Tablet 3    pioglitazone (ACTOS) 30 MG Tab TAKE 1 TABLET BY MOUTH EVERY  Tablet 3    rosuvastatin (CRESTOR) 20 MG Tab Take 1 Tablet by mouth every evening. 100 Tablet 3    ezetimibe (ZETIA) 10 MG Tab Take 1 Tablet by mouth every day. 100 Tablet 3    levothyroxine (SYNTHROID) 75 MCG Tab Take 1 Tablet by mouth every morning on an empty stomach. 100 Tablet 3    losartan (COZAAR) 50 MG Tab TAKE 1 TABLET BY MOUTH EVERY  Tablet 3    escitalopram (LEXAPRO) 20 MG tablet TAKE 1 TABLET BY MOUTH EVERY  Tablet 3    NIFEdipine SR (PROCADIA-XL) 30 MG tablet Take 1 Tablet by mouth every day. 100 Tablet 3    Nystatin Powder 1 Application 4 times a day as needed (skin fold irritation). 1 Each 0    gabapentin (NEURONTIN) 300 MG Cap Take 2 Capsules by mouth 3 times a day. 600 Capsule 3    Blood Glucose Monitoring Suppl Device Meter: Dispense insurance preffered meter. Sig. Use as directed for blood sugar monitoring. 1 Each 0    Blood Glucose Test Strips Test strips order: Test strips for insurance approved meter. Sig: use BID and prn ssx high or low sugar, max 4x/d 100 Strip 3    Lancets Lancets order: Lancets for insurance preffered meter. Sig: use BID and prn ssx high or low sugar Max 4x/day 100 Each 3    oxyCODONE immediate-release (ROXICODONE) 5 MG Tab Take 1 Tablet by mouth every four hours as needed for Severe Pain.      aspirin (ASA) 81 MG Chew Tab chewable tablet Chew 1 Tablet every day.      B Complex Vitamins (VITAMIN B COMPLEX PO) Take  by mouth.      Blood Glucose Monitoring Suppl SUPPLIES Misc Test strips order: Test strips for [employer] Cont[employer] Next meter. Sig: use daily for medication adjustment.  Dx. Code E11.65 100 Strip 3    Cholecalciferol (VITAMIN D3 PO) Take  by mouth.      Fish Oil Oil by Does not apply route.      Ascorbic Acid (VITAMIN C) 1000 MG Tab Take  by mouth.      albuterol 108 (90 BASE) MCG/ACT Aero Soln inhalation aerosol Inhale 2 Puffs by mouth every 6 hours  "as needed for Shortness of Breath. 8.5 g 3     No current [facility]-ordered facility-administered medications on file.          Objective:   Physical Exam:    Vitals: /60 (BP Location: Left arm, Patient Position: Sitting, BP Cuff Size: Adult)   Pulse 72   Temp 36.3 °C (97.4 °F) (Temporal)   Ht 1.753 m (5' 9\")   Wt (!) 126 kg (278 lb 1.6 oz)   SpO2 96%    BMI: Body mass index is 41.07 kg/m².  Physical Exam  Constitutional:       General: He is not in acute distress.     Appearance: Normal appearance. He is not ill-appearing.   HENT:      Right Ear: External ear normal.      Left Ear: External ear normal.   Eyes:      General: No scleral icterus.     Conjunctiva/sclera: Conjunctivae normal.   Cardiovascular:      Rate and Rhythm: Normal rate and regular rhythm.      Pulses: Normal pulses.   Pulmonary:      Effort: Pulmonary effort is normal. No respiratory distress.      Breath sounds: Normal breath sounds. No wheezing or rhonchi.   Abdominal:      General: Bowel sounds are normal.      Palpations: Abdomen is soft.   Skin:     General: Skin is warm and dry.      Comments: Mild dermatitis left forearm   Neurological:      Gait: Gait normal.   Psychiatric:         Mood and Affect: Mood normal.         Behavior: Behavior normal.         Thought Content: Thought content normal.         Judgment: Judgment normal.           Assessment & Plan  1. Anemia.  The patient's blood count indicates intermittent low-grade anemia. His kidney function has also been borderline, indicating good preservation. Liver enzymes and protein levels in the blood are satisfactory. Electrolytes, including sodium, potassium, and calcium, are satisfactory. Cholesterol levels are significantly low, with LDL decreased by 13 points. An iron panel will be conducted to investigate potential causes for the anemia. A bone marrow screening will be conducted to rule out multiple myeloma or MGUS.    2. Coronary artery disease.  A trial of metoprolol will " be initiated for a period of 7 days, reducing from 25 mg to 12.5 mg, after which it will be discontinued.    3. Diabetes.  The patient's metformin dosage will be reduced to 1 in the morning and 2 in the evening.    4. Depression.  The current Wellbutrin dosage will be maintained.        Assessment and Plan:   Milton is a 79 y.o. male with the following:  Problem List Items Addressed This Visit       Acquired hypothyroidism     Chronic stable problem, labs are appropriate, continue medical therapy with levothyroxine 75 mcg daily.         Class 3 severe obesity due to excess calories with serious comorbidity and body mass index (BMI) of 40.0 to 44.9 in adult (HCC)     Chronic and improved problem, commended him on impressive weight loss encouraged him to continue with dietary efforts.         Relevant Medications    metFORMIN ER (GLUCOPHAGE XR) 500 MG TABLET SR 24 HR    Current mild episode of major depressive disorder without prior episode (HCC)     Chronic and improved problem, he thinks that his mood is doing well on current regimen, continue dual therapy with Lexapro 20 mg daily and Wellbutrin 200 mg twice daily.         Relevant Medications    buPROPion (WELLBUTRIN SR) 200 MG SR tablet    Essential hypertension     Chronic and ongoing problem, requires medication adjustment, will have them reduce metoprolol to half a tablet for 1 week and then hold, monitor for palpitations.  Feels that this is contributing to significant fatigue so we will test this today before he meets with cardiology in early August.  Continue losartan 50 mg daily, nifedipine 30 mg daily, and triamterene-hydrochlorothiazide 37.5-25 mg daily.         Relevant Medications    metoprolol SR (TOPROL XL) 25 MG TABLET SR 24 HR    triamterene/hctz (MAXZIDE-25/DYAZIDE) 37.5-25 MG Cap    Iron deficiency anemia     Chronic ongoing issue, will start by checking iron levels as well as SPEP to see if there is a clue into the cause of his anemia.  He has  plans for upper and lower endoscopy after he has cardiac clearance in August.         Relevant Orders    CBC WITH DIFFERENTIAL    FERRITIN    IRON/TOTAL IRON BIND    Stage 3a chronic kidney disease     Chronic and ongoing problem, likely related to hypertension and diabetes, continue with periodic monitoring to ensure stability due to nephrotoxic medications. Check SPEP.         Relevant Orders    SPEP W/REFLEX TO UDAY, A, G, M    Type 2 diabetes mellitus with microalbuminuria, without long-term current use of insulin (HCC)     Chronic and improved problem, reduce metformin to 500 mg in the morning and 1000 mg in the evening and if blood sugars continue to be well-controlled and can also reduce evening dose to 1 pill, continue pioglitazone 30 mg daily at this time.         Relevant Medications    metFORMIN ER (GLUCOPHAGE XR) 500 MG TABLET SR 24 HR      Verbal consent was acquired by the patient to use [employer] ambient listening note generation during this visit Yes     Billing : secondary to the complexity of this patient's illnesses and their interactions.  All problems listed were discussed during the office visit, medications were evaluated and complexities were discussed as well as plan for the future.     RTC: Return in about 7 weeks (around 8/14/2024).    I spent a total of 36 minutes with record review, exam, communication with the patient, communication with other providers, and documentation of this encounter.    PLEASE NOTE: This dictation was created using voice recognition software. I have made every reasonable attempt to correct obvious errors, but I expect that there are errors of grammar and possibly content that I did not discover before finalizing the note.      Agnes Genao, DO  Geriatric and Internal Medicine  [facility]

## 2024-06-26 NOTE — LETTER
[facility]  Agnes Genao D.O.  [address]  [facility] [address]  Fax: [phone]   Authorization for Release/Disclosure of   Protected Health Information   Name: HOWIE JAVED JR. : 1945 SSN: [id number]   Address: [address] [place] [id number] Phone:    [phone] (home)    I authorize the entity listed below to release/disclose the PHI below to:   [facility]/Agnes Genao D.O. and Agnes Genao D.O.   Provider or Entity Name:  [facility]   Address   City, State, Zip   Phone:      Fax:     Reason for request: continuity of care   Information to be released:    [  ] LAST COLONOSCOPY,  including any PATH REPORT and follow-up  [  ] LAST FIT/COLOGUARD RESULT [  ] LAST DEXA  [  ] LAST MAMMOGRAM  [  ] LAST PAP  [  ] LAST LABS [  ] RETINA EXAM REPORT  [  ] IMMUNIZATION RECORDS  [ x ] Release all info      [ x ] Check here and initial the line next to each item to release ALL health information INCLUDING  _____ Care and treatment for drug and / or alcohol abuse  _____ HIV testing, infection status, or AIDS  _____ Genetic Testing    DATES OF SERVICE OR TIME PERIOD TO BE DISCLOSED: _______  I understand and acknowledge that:  * This Authorization may be revoked at any time by you in writing, except if your health information has already been used or disclosed.  * Your health information that will be used or disclosed as a result of you signing this authorization could be re-disclosed by the recipient. If this occurs, your re-disclosed health information may no longer be protected by State or Federal laws.  * You may refuse to sign this Authorization. Your refusal will not affect your ability to obtain treatment.  * This Authorization becomes effective upon signing and will  on (date) __________.      If no date is indicated, this Authorization will  one (1) year from the signature date.    Name: Howie Javed Jr.  Signature: Date:   2024      PLEASE FAX REQUESTED RECORDS BACK TO: [phone]

## 2024-06-26 NOTE — ASSESSMENT & PLAN NOTE
Chronic and improved problem, he thinks that his mood is doing well on current regimen, continue dual therapy with Lexapro 20 mg daily and Wellbutrin 200 mg twice daily.

## 2024-06-26 NOTE — ASSESSMENT & PLAN NOTE
Chronic ongoing issue, will start by checking iron levels as well as SPEP to see if there is a clue into the cause of his anemia.  He has plans for upper and lower endoscopy after he has cardiac clearance in August.

## 2024-06-26 NOTE — ASSESSMENT & PLAN NOTE
Chronic and improved problem, commended him on impressive weight loss encouraged him to continue with dietary efforts.

## 2024-07-04 LAB
APOB+LDLR+PCSK9 GENE MUT ANL BLD/T: NOT DETECTED
BRCA1+BRCA2 DEL+DUP + FULL MUT ANL BLD/T: NOT DETECTED
MLH1+MSH2+MSH6+PMS2 GN DEL+DUP+FUL M: NOT DETECTED

## 2024-07-22 ENCOUNTER — DOCUMENTATION (OUTPATIENT)
Dept: HEALTH INFORMATION MANAGEMENT | Facility: OTHER | Age: 79
End: 2024-07-22
Payer: MEDICARE

## 2024-07-23 ENCOUNTER — DOCUMENTATION (OUTPATIENT)
Dept: HEALTH INFORMATION MANAGEMENT | Facility: OTHER | Age: 79
End: 2024-07-23
Payer: MEDICARE

## 2024-07-26 ENCOUNTER — TELEPHONE (OUTPATIENT)
Dept: CARDIOLOGY | Facility: MEDICAL CENTER | Age: 79
End: 2024-07-26
Payer: MEDICARE

## 2024-08-07 ENCOUNTER — HOSPITAL ENCOUNTER (OUTPATIENT)
Dept: LAB | Facility: MEDICAL CENTER | Age: 79
End: 2024-08-07
Attending: INTERNAL MEDICINE
Payer: MEDICARE

## 2024-08-07 DIAGNOSIS — N18.31 STAGE 3A CHRONIC KIDNEY DISEASE: ICD-10-CM

## 2024-08-07 DIAGNOSIS — D50.9 IRON DEFICIENCY ANEMIA, UNSPECIFIED IRON DEFICIENCY ANEMIA TYPE: ICD-10-CM

## 2024-08-07 LAB
BASOPHILS # BLD AUTO: 0.2 % (ref 0–1.8)
BASOPHILS # BLD: 0.01 K/UL (ref 0–0.12)
EOSINOPHIL # BLD AUTO: 0.32 K/UL (ref 0–0.51)
EOSINOPHIL NFR BLD: 7.8 % (ref 0–6.9)
ERYTHROCYTE [DISTWIDTH] IN BLOOD BY AUTOMATED COUNT: 49.4 FL (ref 35.9–50)
FERRITIN SERPL-MCNC: 302 NG/ML (ref 22–322)
HCT VFR BLD AUTO: 35.6 % (ref 42–52)
HGB BLD-MCNC: 12 G/DL (ref 14–18)
IMM GRANULOCYTES # BLD AUTO: 0.01 K/UL (ref 0–0.11)
IMM GRANULOCYTES NFR BLD AUTO: 0.2 % (ref 0–0.9)
IRON SATN MFR SERPL: 27 % (ref 15–55)
IRON SERPL-MCNC: 76 UG/DL (ref 50–180)
LYMPHOCYTES # BLD AUTO: 0.75 K/UL (ref 1–4.8)
LYMPHOCYTES NFR BLD: 18.3 % (ref 22–41)
MCH RBC QN AUTO: 34.9 PG (ref 27–33)
MCHC RBC AUTO-ENTMCNC: 33.7 G/DL (ref 32.3–36.5)
MCV RBC AUTO: 103.5 FL (ref 81.4–97.8)
MONOCYTES # BLD AUTO: 0.38 K/UL (ref 0–0.85)
MONOCYTES NFR BLD AUTO: 9.3 % (ref 0–13.4)
NEUTROPHILS # BLD AUTO: 2.62 K/UL (ref 1.82–7.42)
NEUTROPHILS NFR BLD: 64.2 % (ref 44–72)
NRBC # BLD AUTO: 0 K/UL
NRBC BLD-RTO: 0 /100 WBC (ref 0–0.2)
PLATELET # BLD AUTO: 195 K/UL (ref 164–446)
PMV BLD AUTO: 9.7 FL (ref 9–12.9)
RBC # BLD AUTO: 3.44 M/UL (ref 4.7–6.1)
TIBC SERPL-MCNC: 277 UG/DL (ref 250–450)
UIBC SERPL-MCNC: 201 UG/DL (ref 110–370)
WBC # BLD AUTO: 4.1 K/UL (ref 4.8–10.8)

## 2024-08-07 PROCEDURE — 84165 PROTEIN E-PHORESIS SERUM: CPT

## 2024-08-07 PROCEDURE — 82728 ASSAY OF FERRITIN: CPT

## 2024-08-07 PROCEDURE — 36415 COLL VENOUS BLD VENIPUNCTURE: CPT

## 2024-08-07 PROCEDURE — 83540 ASSAY OF IRON: CPT

## 2024-08-07 PROCEDURE — 83550 IRON BINDING TEST: CPT

## 2024-08-07 PROCEDURE — 85025 COMPLETE CBC W/AUTO DIFF WBC: CPT

## 2024-08-07 PROCEDURE — 84155 ASSAY OF PROTEIN SERUM: CPT

## 2024-08-09 ENCOUNTER — APPOINTMENT (OUTPATIENT)
Dept: CARDIOLOGY | Facility: MEDICAL CENTER | Age: 79
End: 2024-08-09
Attending: STUDENT IN AN ORGANIZED HEALTH CARE EDUCATION/TRAINING PROGRAM
Payer: MEDICARE

## 2024-08-11 LAB
ALBUMIN SERPL ELPH-MCNC: 3.91 G/DL (ref 3.75–5.01)
ALPHA1 GLOB SERPL ELPH-MCNC: 0.32 G/DL (ref 0.19–0.46)
ALPHA2 GLOB SERPL ELPH-MCNC: 0.85 G/DL (ref 0.48–1.05)
B-GLOBULIN SERPL ELPH-MCNC: 0.7 G/DL (ref 0.48–1.1)
GAMMA GLOB SERPL ELPH-MCNC: 0.72 G/DL (ref 0.62–1.51)
INTERPRETATION SERPL IFE-IMP: NORMAL
MONOCLON BAND OBS SERPL: NORMAL
MONOCLONAL PROTEIN NL11656: NORMAL G/DL
PATHOLOGY STUDY: NORMAL
PROT SERPL-MCNC: 6.5 G/DL (ref 6.3–8.2)

## 2024-08-13 ENCOUNTER — APPOINTMENT (OUTPATIENT)
Dept: MEDICAL GROUP | Facility: PHYSICIAN GROUP | Age: 79
End: 2024-08-13
Payer: MEDICARE

## 2024-09-05 DIAGNOSIS — L30.4 INTERTRIGO: ICD-10-CM

## 2024-09-05 RX ORDER — NYSTATIN 10B UNIT
1 POWDER (EA) MISCELLANEOUS 4 TIMES DAILY PRN
Qty: 1 EACH | Refills: 1 | Status: SHIPPED | OUTPATIENT
Start: 2024-09-05

## 2024-09-05 NOTE — TELEPHONE ENCOUNTER
Please get him follow up appointment with me, nothing on the books now, cancelled our appointment last month.  
Received request via: Patient    Was the patient seen in the last year in this department? Yes    Does the patient have an active prescription (recently filled or refills available) for medication(s) requested? No    [facility]    Does the patient have half-way Plus and need 100-day supply? (This applies to ALL medications) Yes, quantity updated to 100 days  
Refill request via pharmacy for Nystatin Powder   
None known

## 2024-11-26 DIAGNOSIS — I10 ESSENTIAL HYPERTENSION: ICD-10-CM

## 2024-11-26 RX ORDER — NIFEDIPINE 30 MG/1
30 TABLET, EXTENDED RELEASE ORAL DAILY
Qty: 100 TABLET | Refills: 3 | Status: SHIPPED | OUTPATIENT
Start: 2024-11-26

## 2024-12-09 DIAGNOSIS — E11.29 TYPE 2 DIABETES MELLITUS WITH MICROALBUMINURIA, WITHOUT LONG-TERM CURRENT USE OF INSULIN (HCC): ICD-10-CM

## 2024-12-09 DIAGNOSIS — R80.9 TYPE 2 DIABETES MELLITUS WITH MICROALBUMINURIA, WITHOUT LONG-TERM CURRENT USE OF INSULIN (HCC): ICD-10-CM

## 2024-12-09 RX ORDER — GABAPENTIN 300 MG/1
600 CAPSULE ORAL 3 TIMES DAILY
Qty: 600 CAPSULE | Refills: 3 | Status: SHIPPED | OUTPATIENT
Start: 2024-12-09

## 2024-12-12 DIAGNOSIS — E11.65 TYPE 2 DIABETES MELLITUS WITH HYPERGLYCEMIA, WITHOUT LONG-TERM CURRENT USE OF INSULIN (HCC): ICD-10-CM

## 2024-12-12 RX ORDER — LOSARTAN POTASSIUM 50 MG/1
50 TABLET ORAL DAILY
Qty: 100 TABLET | Refills: 0 | Status: SHIPPED | OUTPATIENT
Start: 2024-12-12

## 2024-12-30 DIAGNOSIS — F32.5 MAJOR DEPRESSIVE DISORDER WITH SINGLE EPISODE, IN FULL REMISSION (HCC): ICD-10-CM

## 2024-12-30 NOTE — TELEPHONE ENCOUNTER
Received request via: Pharmacy    Was the patient seen in the last year in this department? Yes    Does the patient have an active prescription (recently filled or refills available) for medication(s) requested? No    Does the patient have USP Plus and need 100-day supply? (This applies to ALL medications) Yes, quantity updated to 100 days

## 2024-12-31 RX ORDER — ESCITALOPRAM OXALATE 20 MG/1
20 TABLET ORAL
Qty: 100 TABLET | Refills: 3 | Status: SHIPPED | OUTPATIENT
Start: 2024-12-31

## 2025-01-08 DIAGNOSIS — E78.2 MIXED HYPERLIPIDEMIA: ICD-10-CM

## 2025-01-08 RX ORDER — ROSUVASTATIN CALCIUM 20 MG/1
20 TABLET, COATED ORAL EVERY EVENING
Qty: 100 TABLET | Refills: 3 | Status: SHIPPED | OUTPATIENT
Start: 2025-01-08

## 2025-01-08 NOTE — TELEPHONE ENCOUNTER
Received request via: Pharmacy    Was the patient seen in the last year in this department? Yes    Does the patient have an active prescription (recently filled or refills available) for medication(s) requested? No    Does the patient have longterm Plus and need 100-day supply? (This applies to ALL medications) Yes, quantity updated to 100 days

## 2025-01-21 ENCOUNTER — OFFICE VISIT (OUTPATIENT)
Dept: MEDICAL GROUP | Facility: PHYSICIAN GROUP | Age: 80
End: 2025-01-21
Payer: MEDICARE

## 2025-01-21 VITALS
HEIGHT: 67 IN | DIASTOLIC BLOOD PRESSURE: 60 MMHG | SYSTOLIC BLOOD PRESSURE: 126 MMHG | WEIGHT: 275.1 LBS | RESPIRATION RATE: 20 BRPM | BODY MASS INDEX: 43.18 KG/M2 | TEMPERATURE: 96.8 F | HEART RATE: 75 BPM | OXYGEN SATURATION: 92 %

## 2025-01-21 DIAGNOSIS — E66.01 CLASS 3 SEVERE OBESITY DUE TO EXCESS CALORIES WITH SERIOUS COMORBIDITY AND BODY MASS INDEX (BMI) OF 40.0 TO 44.9 IN ADULT (HCC): ICD-10-CM

## 2025-01-21 DIAGNOSIS — B35.1 ONYCHOMYCOSIS: ICD-10-CM

## 2025-01-21 DIAGNOSIS — E66.813 CLASS 3 SEVERE OBESITY DUE TO EXCESS CALORIES WITH SERIOUS COMORBIDITY AND BODY MASS INDEX (BMI) OF 40.0 TO 44.9 IN ADULT (HCC): ICD-10-CM

## 2025-01-21 DIAGNOSIS — N18.31 STAGE 3A CHRONIC KIDNEY DISEASE: ICD-10-CM

## 2025-01-21 DIAGNOSIS — Z23 NEED FOR VACCINATION: ICD-10-CM

## 2025-01-21 DIAGNOSIS — E11.29 TYPE 2 DIABETES MELLITUS WITH MICROALBUMINURIA, WITHOUT LONG-TERM CURRENT USE OF INSULIN (HCC): Primary | ICD-10-CM

## 2025-01-21 DIAGNOSIS — E78.2 MIXED HYPERLIPIDEMIA: ICD-10-CM

## 2025-01-21 DIAGNOSIS — Z12.5 ENCOUNTER FOR SCREENING FOR MALIGNANT NEOPLASM OF PROSTATE: ICD-10-CM

## 2025-01-21 DIAGNOSIS — J45.20 MILD INTERMITTENT ASTHMA WITHOUT COMPLICATION: ICD-10-CM

## 2025-01-21 DIAGNOSIS — R80.9 TYPE 2 DIABETES MELLITUS WITH MICROALBUMINURIA, WITHOUT LONG-TERM CURRENT USE OF INSULIN (HCC): Primary | ICD-10-CM

## 2025-01-21 DIAGNOSIS — F11.20 UNCOMPLICATED OPIOID DEPENDENCE (HCC): ICD-10-CM

## 2025-01-21 DIAGNOSIS — E03.9 ACQUIRED HYPOTHYROIDISM: ICD-10-CM

## 2025-01-21 DIAGNOSIS — F32.0 CURRENT MILD EPISODE OF MAJOR DEPRESSIVE DISORDER WITHOUT PRIOR EPISODE (HCC): ICD-10-CM

## 2025-01-21 LAB
HBA1C MFR BLD: 5.7 % (ref ?–5.8)
POCT INT CON NEG: NEGATIVE
POCT INT CON POS: POSITIVE
RETINAL SCREEN: NORMAL

## 2025-01-21 PROCEDURE — 3074F SYST BP LT 130 MM HG: CPT | Performed by: INTERNAL MEDICINE

## 2025-01-21 PROCEDURE — 90662 IIV NO PRSV INCREASED AG IM: CPT | Performed by: INTERNAL MEDICINE

## 2025-01-21 PROCEDURE — G0008 ADMIN INFLUENZA VIRUS VAC: HCPCS | Performed by: INTERNAL MEDICINE

## 2025-01-21 PROCEDURE — 3078F DIAST BP <80 MM HG: CPT | Performed by: INTERNAL MEDICINE

## 2025-01-21 PROCEDURE — 83036 HEMOGLOBIN GLYCOSYLATED A1C: CPT | Performed by: INTERNAL MEDICINE

## 2025-01-21 PROCEDURE — 99214 OFFICE O/P EST MOD 30 MIN: CPT | Mod: 25 | Performed by: INTERNAL MEDICINE

## 2025-01-21 RX ORDER — ALBUTEROL SULFATE 90 UG/1
2 INHALANT RESPIRATORY (INHALATION) EVERY 6 HOURS PRN
Qty: 8.5 G | Refills: 3 | Status: SHIPPED | OUTPATIENT
Start: 2025-01-21

## 2025-01-21 RX ORDER — LEVOTHYROXINE SODIUM 75 UG/1
75 TABLET ORAL
Qty: 100 TABLET | Refills: 3 | Status: SHIPPED | OUTPATIENT
Start: 2025-01-21

## 2025-01-21 RX ORDER — METFORMIN HYDROCHLORIDE 500 MG/1
500 TABLET, EXTENDED RELEASE ORAL 2 TIMES DAILY
Qty: 200 TABLET | Refills: 3 | Status: SHIPPED | OUTPATIENT
Start: 2025-01-21

## 2025-01-21 ASSESSMENT — PATIENT HEALTH QUESTIONNAIRE - PHQ9: CLINICAL INTERPRETATION OF PHQ2 SCORE: 0

## 2025-01-21 ASSESSMENT — FIBROSIS 4 INDEX: FIB4 SCORE: 1.93

## 2025-01-21 NOTE — PROGRESS NOTES
Subjective:   Chief Complaint/History of Present Illness:  Milton Javed Jr. is a 79 y.o. male established patient who presents today to discuss medical problems as listed below. William is unaccompanied for today's visit.    History of Present Illness  The patient presents for evaluation of diabetes mellitus, asthma, hypertension, neck pain, onychomycosis, and health maintenance.    He reports a significant improvement in his overall health status, with a stable weight that he wishes to reduce further. He has been adhering to a high-protein diet, supplemented with vegetables such as broccoli. He is due for the annual retinal eye exam tomorrow, overdue for general ophthalmology exam. He is unsure if he has one scheduled with an ophthalmologist. He has previously consulted Dr. Darren Devi for various ocular assessments. His last eye exam was conducted in this clinic. 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 He is uncertain if he needs to go in person or do it online. He has not had a PSA test in over a year.    He experienced a severe asthma attack, which he attributes to exposure to fine dust particles or exertion while working in the garage. Despite this incident, he reports no current respiratory distress, wheezing, or shortness of breath. He does, however, report sinus-related symptoms, including morning congestion and and nasal drainage, but no productive cough.    He has been managing his diabetes with metformin, taking one tablet in the morning and two with dinner, along with pioglitazone. He previously used Trulicity injections but discontinued due to cost constraints. He reports no adverse effects from Trulicity, such as nausea or constipation.    He has been experiencing neck discomfort, which he manages with a travel U-shaped pillow during sleep. He has not been using gabapentin regularly until recent due to worsening neck pain. He follows with pain medicine for severe  arthritis.    He has a history of onychomycosis, for which he has attempted various treatments, including soaking and topical ointments, but without success. He has not consulted a podiatrist recently but agrees he could use help with toenail trimming and callus debridement.     He was previously monitoring his blood pressure at home using a wrist cuff, which is currently broken. He is seeking recommendations for a replacement device.    FAMILY HISTORY  His brother had severe side effects from a beta blocker, which significantly impacted his quality of life.    MEDICATIONS  Current: Metformin, pioglitazone, losartan, nifedipine, gabapentin  Past: Trulicity         Current Medications:  Current Outpatient Medications Ordered in Epic   Medication Sig Dispense Refill    Tirzepatide 5 MG/0.5ML Solution Auto-injector Inject 5 mg under the skin every 7 days. 2 mL 3    metFORMIN ER (GLUCOPHAGE XR) 500 MG TABLET SR 24 HR Take 1 Tablet by mouth 2 times a day. 200 Tablet 3    levothyroxine (SYNTHROID) 75 MCG Tab Take 1 Tablet by mouth every morning on an empty stomach. 100 Tablet 3    albuterol 108 (90 Base) MCG/ACT Aero Soln inhalation aerosol Inhale 2 Puffs every 6 hours as needed for Shortness of Breath. 8.5 g 3    rosuvastatin (CRESTOR) 20 MG Tab TAKE 1 TABLET BY MOUTH EVERY EVENING 100 Tablet 3    escitalopram (LEXAPRO) 20 MG tablet Take 1 Tablet by mouth every day. 100 Tablet 3    losartan (COZAAR) 50 MG Tab Take 1 Tablet by mouth every day. 100 Tablet 0    gabapentin (NEURONTIN) 300 MG Cap Take 2 Capsules by mouth 3 times a day. 600 Capsule 3    NIFEdipine SR (PROCADIA-XL) 30 MG tablet Take 1 Tablet by mouth every day. 100 Tablet 3    Nystatin Powder 1 Application 4 times a day as needed (skin fold irritation). 1 Each 1    triamterene/hctz (MAXZIDE-25/DYAZIDE) 37.5-25 MG Cap Take 1 Capsule by mouth every day. 100 Capsule 3    buPROPion (WELLBUTRIN SR) 200 MG SR tablet Take 1 Tablet by mouth 2 times a day. 200 Tablet 3  "   ezetimibe (ZETIA) 10 MG Tab Take 1 Tablet by mouth every day. 100 Tablet 3    Blood Glucose Monitoring Suppl Device Meter: Dispense insurance preffered meter. Sig. Use as directed for blood sugar monitoring. 1 Each 0    Lancets Lancets order: Lancets for insurance preffered meter. Sig: use BID and prn ssx high or low sugar Max 4x/day 100 Each 3    oxyCODONE immediate-release (ROXICODONE) 5 MG Tab Take 1 Tablet by mouth every four hours as needed for Severe Pain.      aspirin (ASA) 81 MG Chew Tab chewable tablet Chew 1 Tablet every day.      B Complex Vitamins (VITAMIN B COMPLEX PO) Take  by mouth.      Blood Glucose Monitoring Suppl SUPPLIES Misc Test strips order: Test strips for [employer] Contour Next meter. Sig: use daily for medication adjustment.  Dx. Code E11.65 100 Strip 3    Cholecalciferol (VITAMIN D3 PO) Take  by mouth.      Ascorbic Acid (VITAMIN C) 1000 MG Tab Take  by mouth.       No current Epic-ordered facility-administered medications on file.          Objective:   Physical Exam:    Vitals: /60 (BP Location: Right arm, Patient Position: Sitting, BP Cuff Size: Adult)   Pulse 75   Temp 36 °C (96.8 °F) (Temporal)   Resp 20   Ht 1.702 m (5' 7\")   Wt 125 kg (275 lb 1.6 oz)   SpO2 92%    BMI: Body mass index is 43.09 kg/m².  Physical Exam  Constitutional:       General: He is not in acute distress.     Appearance: Normal appearance. He is not ill-appearing.   HENT:      Right Ear: Ear canal and external ear normal. There is no impacted cerumen.      Left Ear: Ear canal and external ear normal. There is no impacted cerumen.      Ears:      Comments: Hard of hearing, not wearing hearing aids  Eyes:      General: No scleral icterus.     Conjunctiva/sclera: Conjunctivae normal.   Cardiovascular:      Rate and Rhythm: Normal rate and regular rhythm.      Pulses: Normal pulses.   Pulmonary:      Effort: Pulmonary effort is normal. No respiratory distress.      Breath sounds: Normal breath sounds. No " wheezing or rhonchi.      Comments: Mild bibasilar wheezing   Abdominal:      General: Bowel sounds are normal.      Palpations: Abdomen is soft.   Musculoskeletal:      Right lower leg: Edema present.      Left lower leg: Edema present.   Skin:     General: Skin is warm and dry.      Comments: Small excoriations on arms/hands   Neurological:      Comments: Antalgic gait   Psychiatric:         Mood and Affect: Mood normal.         Behavior: Behavior normal.         Thought Content: Thought content normal.         Judgment: Judgment normal.           Results  Laboratory Studies  A1c is 5.7. PSA level was 0.3 on 12/29/2023.    Assessment & Plan  1. Diabetes mellitus type 2 with microalbuminuria.  His A1c level is commendably low at 5.7, indicating excellent diabetes management. He has successfully maintained a weight loss of approximately 20 pounds over the past year, with an additional loss of 3 to 4 pounds. He was previously on Trulicity, which is beneficial for diabetes control but less effective for weight loss. He is advised to maintain a balanced diet rich in protein and fiber, and to consider incorporating protein shakes into his diet. A prescription for Mounjaro 5 mg will be initiated, with the dosage to be increased every 4 weeks until an optimal effect is achieved. He is instructed to communicate any concerns or observations via message. The pioglitazone will be discontinued due to its potential side effect of fluid retention. The metformin dosage will be reduced to 500 mg twice daily to mitigate the constipating effect of the injectable medication. He is also advised to monitor his blood pressure regularly, as weight loss may necessitate a reduction in his antihypertensive medication dosage.    2. Asthma, mild intermittent.  He experienced a severe asthma attack, likely triggered by fine dust. A prescription for albuterol will be provided for maintenance.    3. Hypertension.  He is advised to monitor his  blood pressure regularly, especially with anticipated weight loss, to avoid hypotension. An Omron upper arm blood pressure cuff is recommended for more accurate readings.    4. Cervical neck pain.  5. Chronic opioid dependence, uncomplicated  He reports neck pain and has been using a travel U-shaped pillow for relief. He has not been taking gabapentin regularly. He is advised to notify if he needs a refill for gabapentin 600 mg. He will continue regular follow up with pain medicine for oxycodone for chronic arthritic pain.    6. Onychomycosis.  He reports a fungal infection in his toes. Topical treatments have been ineffective, and oral antifungal medications are not recommended due to potential liver toxicity. He is advised to see a podiatrist for toenail trimming and callus debridement.    7. Dyslipidemia  8. CKD stage 3a  9. Hypothyroidism  He is due for a foot exam, which will be conducted today. He is also due for an eye exam, and a referral to ophthalmology will be made for a comprehensive eye examination. A retinal screening will be performed in the clinic today. An order for full blood work, including a PSA test, will be placed. He is advised to see urology if there is a change in his PSA levels. Update lipid panel and continue ezetimibe 10 mg daily and Crestor 20 mg daily. Metformin reduced and Actos stopped, recheck urine microalbumin and GFR to ensure stability, continue on ARB with losartan 50 mg daily. Continue levothyroxine 75 mcg daily and recheck thyroid labs to ensure stability.       Assessment and Plan:   Milton is a 79 y.o. male with the following:  Problem List Items Addressed This Visit       Acquired hypothyroidism    Relevant Medications    levothyroxine (SYNTHROID) 75 MCG Tab    Other Relevant Orders    FREE THYROXINE    TSH    Class 3 severe obesity due to excess calories with serious comorbidity and body mass index (BMI) of 40.0 to 44.9 in adult (HCC)    Relevant Medications    Tirzepatide  5 MG/0.5ML Solution Auto-injector    metFORMIN ER (GLUCOPHAGE XR) 500 MG TABLET SR 24 HR    Other Relevant Orders    Patient identified as having weight management issue.  Appropriate orders and counseling given.    Current mild episode of major depressive disorder without prior episode (HCC)    Mild intermittent asthma without complication    Relevant Medications    albuterol 108 (90 Base) MCG/ACT Aero Soln inhalation aerosol    Mixed hyperlipidemia    Relevant Orders    FREE THYROXINE    TSH    VITAMIN B12    VITAMIN D,25 HYDROXY (DEFICIENCY)    Lipid Profile    Stage 3a chronic kidney disease    Relevant Orders    Comp Metabolic Panel    CBC WITH DIFFERENTIAL    Type 2 diabetes mellitus with microalbuminuria, without long-term current use of insulin (HCC) - Primary    Relevant Medications    Tirzepatide 5 MG/0.5ML Solution Auto-injector    metFORMIN ER (GLUCOPHAGE XR) 500 MG TABLET SR 24 HR    Other Relevant Orders    Diabetic Monofilament LE Exam (Completed)    POCT  A1C (Completed)    POCT Retinal Eye Exam    Referral to Ophthalmology    VITAMIN B12    MICROALBUMIN CREAT RATIO URINE    Patient identified as having weight management issue.  Appropriate orders and counseling given.    Uncomplicated opioid dependence (HCC)     Other Visit Diagnoses       Need for vaccination        Relevant Orders    Influenza Vaccine, High Dose (65+ Only) (Completed)    Encounter for screening for malignant neoplasm of prostate        Relevant Orders    PROSTATE SPECIFIC AG SCREENING    Onychomycosis                   RTC: Return in about 3 months (around 4/21/2025) for AWV.    I spent a total of 31 minutes with record review, exam, communication with the patient, communication with other providers, and documentation of this encounter.    Verbal consent was acquired by the patient to use [email or web address] ambient listening note generation during this visit Yes       PLEASE NOTE: This dictation was created using voice recognition  software. I have made every reasonable attempt to correct obvious errors, but I expect that there are errors of grammar and possibly content that I did not discover before finalizing the note.      Agnes Genao, DO  Geriatric and Internal Medicine  [facility]

## 2025-01-21 NOTE — PATIENT INSTRUCTIONS
Omron upper arm blood pressure cuff    Podiatry-- call to make a follow up  [facility]  [address]  [place] [address]  Phone: [phone]

## 2025-02-24 RX ORDER — PIOGLITAZONE 30 MG/1
30 TABLET ORAL DAILY
COMMUNITY
End: 2025-02-24 | Stop reason: SDUPTHER

## 2025-02-24 RX ORDER — PIOGLITAZONE 30 MG/1
30 TABLET ORAL DAILY
Qty: 100 TABLET | Refills: 3 | Status: SHIPPED | OUTPATIENT
Start: 2025-02-24

## 2025-02-24 NOTE — TELEPHONE ENCOUNTER
Received request via: Pharmacy    Was the patient seen in the last year in this department? Yes    Does the patient have an active prescription (recently filled or refills available) for medication(s) requested? No    [facility] Name[facility]    Does the patient have [facility] Plus and need 100-day supply? (This applies to ALL medications) Yes, quantity updated to 100 days

## 2025-03-25 ENCOUNTER — PATIENT MESSAGE (OUTPATIENT)
Dept: MEDICAL GROUP | Facility: PHYSICIAN GROUP | Age: 80
End: 2025-03-25
Payer: MEDICARE

## 2025-03-25 DIAGNOSIS — E11.29 TYPE 2 DIABETES MELLITUS WITH MICROALBUMINURIA, WITHOUT LONG-TERM CURRENT USE OF INSULIN (HCC): ICD-10-CM

## 2025-03-25 DIAGNOSIS — F32.0 CURRENT MILD EPISODE OF MAJOR DEPRESSIVE DISORDER WITHOUT PRIOR EPISODE (HCC): ICD-10-CM

## 2025-03-25 DIAGNOSIS — E11.65 TYPE 2 DIABETES MELLITUS WITH HYPERGLYCEMIA, WITHOUT LONG-TERM CURRENT USE OF INSULIN (HCC): ICD-10-CM

## 2025-03-25 DIAGNOSIS — R80.9 TYPE 2 DIABETES MELLITUS WITH MICROALBUMINURIA, WITHOUT LONG-TERM CURRENT USE OF INSULIN (HCC): ICD-10-CM

## 2025-03-25 RX ORDER — BUPROPION HYDROCHLORIDE 200 MG/1
200 TABLET, EXTENDED RELEASE ORAL 2 TIMES DAILY
Qty: 200 TABLET | Refills: 3 | Status: SHIPPED | OUTPATIENT
Start: 2025-03-25

## 2025-03-25 RX ORDER — METFORMIN HYDROCHLORIDE 500 MG/1
500 TABLET, EXTENDED RELEASE ORAL 2 TIMES DAILY
Qty: 200 TABLET | Refills: 3 | Status: SHIPPED | OUTPATIENT
Start: 2025-03-25

## 2025-03-25 RX ORDER — LOSARTAN POTASSIUM 50 MG/1
50 TABLET ORAL DAILY
Qty: 100 TABLET | Refills: 3 | Status: SHIPPED | OUTPATIENT
Start: 2025-03-25

## 2025-05-03 DIAGNOSIS — L30.4 INTERTRIGO: ICD-10-CM

## 2025-05-03 DIAGNOSIS — E78.2 MIXED HYPERLIPIDEMIA: ICD-10-CM

## 2025-05-05 RX ORDER — NYSTATIN 10B UNIT
1 POWDER (EA) MISCELLANEOUS 4 TIMES DAILY PRN
Qty: 1 EACH | Refills: 1 | Status: SHIPPED | OUTPATIENT
Start: 2025-05-05

## 2025-05-05 RX ORDER — EZETIMIBE 10 MG/1
10 TABLET ORAL DAILY
Qty: 100 TABLET | Refills: 3 | Status: SHIPPED | OUTPATIENT
Start: 2025-05-05

## 2025-05-05 RX ORDER — ROSUVASTATIN CALCIUM 20 MG/1
20 TABLET, COATED ORAL EVERY EVENING
Qty: 100 TABLET | Refills: 3 | Status: SHIPPED | OUTPATIENT
Start: 2025-05-05

## 2025-05-15 ENCOUNTER — APPOINTMENT (OUTPATIENT)
Dept: LAB | Facility: MEDICAL CENTER | Age: 80
End: 2025-05-15
Payer: MEDICARE

## 2025-05-31 ENCOUNTER — HOSPITAL ENCOUNTER (OUTPATIENT)
Dept: LAB | Facility: MEDICAL CENTER | Age: 80
End: 2025-05-31
Attending: INTERNAL MEDICINE
Payer: MEDICARE

## 2025-05-31 DIAGNOSIS — E11.29 TYPE 2 DIABETES MELLITUS WITH MICROALBUMINURIA, WITHOUT LONG-TERM CURRENT USE OF INSULIN (HCC): ICD-10-CM

## 2025-05-31 DIAGNOSIS — Z12.5 ENCOUNTER FOR SCREENING FOR MALIGNANT NEOPLASM OF PROSTATE: ICD-10-CM

## 2025-05-31 DIAGNOSIS — E78.2 MIXED HYPERLIPIDEMIA: ICD-10-CM

## 2025-05-31 DIAGNOSIS — N18.31 STAGE 3A CHRONIC KIDNEY DISEASE: ICD-10-CM

## 2025-05-31 DIAGNOSIS — E03.9 ACQUIRED HYPOTHYROIDISM: ICD-10-CM

## 2025-05-31 DIAGNOSIS — R80.9 TYPE 2 DIABETES MELLITUS WITH MICROALBUMINURIA, WITHOUT LONG-TERM CURRENT USE OF INSULIN (HCC): ICD-10-CM

## 2025-05-31 LAB
25(OH)D3 SERPL-MCNC: 98 NG/ML (ref 30–100)
ALBUMIN SERPL BCP-MCNC: 4.3 G/DL (ref 3.2–4.9)
ALBUMIN/GLOB SERPL: 1.7 G/DL
ALP SERPL-CCNC: 63 U/L (ref 30–99)
ALT SERPL-CCNC: 27 U/L (ref 2–50)
ANION GAP SERPL CALC-SCNC: 12 MMOL/L (ref 7–16)
AST SERPL-CCNC: 29 U/L (ref 12–45)
BASOPHILS # BLD AUTO: 1.2 % (ref 0–1.8)
BASOPHILS # BLD: 0.05 K/UL (ref 0–0.12)
BILIRUB SERPL-MCNC: 0.3 MG/DL (ref 0.1–1.5)
BUN SERPL-MCNC: 29 MG/DL (ref 8–22)
CALCIUM ALBUM COR SERPL-MCNC: 8.7 MG/DL (ref 8.5–10.5)
CALCIUM SERPL-MCNC: 8.9 MG/DL (ref 8.5–10.5)
CHLORIDE SERPL-SCNC: 109 MMOL/L (ref 96–112)
CHOLEST SERPL-MCNC: 130 MG/DL (ref 100–199)
CO2 SERPL-SCNC: 18 MMOL/L (ref 20–33)
CREAT SERPL-MCNC: 1.42 MG/DL (ref 0.5–1.4)
CREAT UR-MCNC: 254 MG/DL
EOSINOPHIL # BLD AUTO: 0.41 K/UL (ref 0–0.51)
EOSINOPHIL NFR BLD: 9.6 % (ref 0–6.9)
ERYTHROCYTE [DISTWIDTH] IN BLOOD BY AUTOMATED COUNT: 49.1 FL (ref 35.9–50)
FASTING STATUS PATIENT QL REPORTED: NORMAL
GFR SERPLBLD CREATININE-BSD FMLA CKD-EPI: 51 ML/MIN/1.73 M 2
GLOBULIN SER CALC-MCNC: 2.6 G/DL (ref 1.9–3.5)
GLUCOSE SERPL-MCNC: 109 MG/DL (ref 65–99)
HCT VFR BLD AUTO: 36.9 % (ref 42–52)
HDLC SERPL-MCNC: 48 MG/DL
HGB BLD-MCNC: 12.7 G/DL (ref 14–18)
IMM GRANULOCYTES # BLD AUTO: 0.01 K/UL (ref 0–0.11)
IMM GRANULOCYTES NFR BLD AUTO: 0.2 % (ref 0–0.9)
LDLC SERPL CALC-MCNC: 68 MG/DL
LYMPHOCYTES # BLD AUTO: 0.92 K/UL (ref 1–4.8)
LYMPHOCYTES NFR BLD: 21.5 % (ref 22–41)
MCH RBC QN AUTO: 34 PG (ref 27–33)
MCHC RBC AUTO-ENTMCNC: 34.4 G/DL (ref 32.3–36.5)
MCV RBC AUTO: 98.9 FL (ref 81.4–97.8)
MICROALBUMIN UR-MCNC: 4.7 MG/DL
MICROALBUMIN/CREAT UR: 19 MG/G (ref 0–30)
MONOCYTES # BLD AUTO: 0.44 K/UL (ref 0–0.85)
MONOCYTES NFR BLD AUTO: 10.3 % (ref 0–13.4)
NEUTROPHILS # BLD AUTO: 2.45 K/UL (ref 1.82–7.42)
NEUTROPHILS NFR BLD: 57.2 % (ref 44–72)
NRBC # BLD AUTO: 0 K/UL
NRBC BLD-RTO: 0 /100 WBC (ref 0–0.2)
PLATELET # BLD AUTO: 188 K/UL (ref 164–446)
PMV BLD AUTO: 9.6 FL (ref 9–12.9)
POTASSIUM SERPL-SCNC: 4.4 MMOL/L (ref 3.6–5.5)
PROT SERPL-MCNC: 6.9 G/DL (ref 6–8.2)
PSA SERPL DL<=0.01 NG/ML-MCNC: 0.26 NG/ML (ref 0–4)
RBC # BLD AUTO: 3.73 M/UL (ref 4.7–6.1)
SODIUM SERPL-SCNC: 139 MMOL/L (ref 135–145)
T4 FREE SERPL-MCNC: 1.04 NG/DL (ref 0.93–1.7)
TRIGL SERPL-MCNC: 70 MG/DL (ref 0–149)
TSH SERPL-ACNC: 4.95 UIU/ML (ref 0.38–5.33)
VIT B12 SERPL-MCNC: 567 PG/ML (ref 211–911)
WBC # BLD AUTO: 4.3 K/UL (ref 4.8–10.8)

## 2025-05-31 PROCEDURE — 82306 VITAMIN D 25 HYDROXY: CPT

## 2025-05-31 PROCEDURE — 82607 VITAMIN B-12: CPT

## 2025-05-31 PROCEDURE — 82043 UR ALBUMIN QUANTITATIVE: CPT

## 2025-05-31 PROCEDURE — 82570 ASSAY OF URINE CREATININE: CPT

## 2025-05-31 PROCEDURE — 80053 COMPREHEN METABOLIC PANEL: CPT

## 2025-05-31 PROCEDURE — 84439 ASSAY OF FREE THYROXINE: CPT

## 2025-05-31 PROCEDURE — 80061 LIPID PANEL: CPT

## 2025-05-31 PROCEDURE — 84443 ASSAY THYROID STIM HORMONE: CPT

## 2025-05-31 PROCEDURE — 85025 COMPLETE CBC W/AUTO DIFF WBC: CPT

## 2025-05-31 PROCEDURE — 36415 COLL VENOUS BLD VENIPUNCTURE: CPT

## 2025-05-31 PROCEDURE — 84153 ASSAY OF PSA TOTAL: CPT

## 2025-06-02 ENCOUNTER — RESULTS FOLLOW-UP (OUTPATIENT)
Dept: MEDICAL GROUP | Facility: PHYSICIAN GROUP | Age: 80
End: 2025-06-02

## 2025-06-04 ENCOUNTER — APPOINTMENT (OUTPATIENT)
Dept: MEDICAL GROUP | Facility: PHYSICIAN GROUP | Age: 80
End: 2025-06-04
Payer: MEDICARE

## 2025-06-04 VITALS
WEIGHT: 265 LBS | SYSTOLIC BLOOD PRESSURE: 120 MMHG | DIASTOLIC BLOOD PRESSURE: 60 MMHG | HEIGHT: 67 IN | BODY MASS INDEX: 41.59 KG/M2 | TEMPERATURE: 97.2 F | HEART RATE: 69 BPM | OXYGEN SATURATION: 97 %

## 2025-06-04 DIAGNOSIS — R80.9 TYPE 2 DIABETES MELLITUS WITH MICROALBUMINURIA, WITHOUT LONG-TERM CURRENT USE OF INSULIN (HCC): ICD-10-CM

## 2025-06-04 DIAGNOSIS — K04.7 TOOTH INFECTION: ICD-10-CM

## 2025-06-04 DIAGNOSIS — E03.9 ACQUIRED HYPOTHYROIDISM: ICD-10-CM

## 2025-06-04 DIAGNOSIS — I10 ESSENTIAL HYPERTENSION: ICD-10-CM

## 2025-06-04 DIAGNOSIS — Z00.00 ENCOUNTER FOR SUBSEQUENT ANNUAL WELLNESS VISIT (AWV) IN MEDICARE PATIENT: Primary | ICD-10-CM

## 2025-06-04 DIAGNOSIS — Z78.9 POLST (PHYSICIAN ORDERS FOR LIFE-SUSTAINING TREATMENT): ICD-10-CM

## 2025-06-04 DIAGNOSIS — E11.29 TYPE 2 DIABETES MELLITUS WITH MICROALBUMINURIA, WITHOUT LONG-TERM CURRENT USE OF INSULIN (HCC): ICD-10-CM

## 2025-06-04 DIAGNOSIS — E78.2 MIXED HYPERLIPIDEMIA: ICD-10-CM

## 2025-06-04 DIAGNOSIS — F32.5 MAJOR DEPRESSIVE DISORDER WITH SINGLE EPISODE, IN FULL REMISSION (HCC): ICD-10-CM

## 2025-06-04 DIAGNOSIS — Z71.1 CONCERN ABOUT MEMORY: ICD-10-CM

## 2025-06-04 DIAGNOSIS — M50.121 CERVICAL DISC DISORDER AT C4-C5 LEVEL WITH RADICULOPATHY: ICD-10-CM

## 2025-06-04 LAB
HBA1C MFR BLD: 5.5 % (ref ?–5.8)
POCT INT CON NEG: NEGATIVE
POCT INT CON POS: POSITIVE

## 2025-06-04 PROCEDURE — 3074F SYST BP LT 130 MM HG: CPT | Performed by: INTERNAL MEDICINE

## 2025-06-04 PROCEDURE — G0439 PPPS, SUBSEQ VISIT: HCPCS | Performed by: INTERNAL MEDICINE

## 2025-06-04 PROCEDURE — 83036 HEMOGLOBIN GLYCOSYLATED A1C: CPT | Performed by: INTERNAL MEDICINE

## 2025-06-04 PROCEDURE — 3078F DIAST BP <80 MM HG: CPT | Performed by: INTERNAL MEDICINE

## 2025-06-04 RX ORDER — NYSTATIN TOPICAL POWDER 100000 U/G
1 POWDER TOPICAL 4 TIMES DAILY
COMMUNITY
Start: 2025-05-06

## 2025-06-04 RX ORDER — GABAPENTIN 300 MG/1
600 CAPSULE ORAL 3 TIMES DAILY
Qty: 600 CAPSULE | Refills: 3 | Status: SHIPPED | OUTPATIENT
Start: 2025-06-04

## 2025-06-04 RX ORDER — PIOGLITAZONE 30 MG/1
30 TABLET ORAL DAILY
Qty: 100 TABLET | Refills: 3 | Status: SHIPPED | OUTPATIENT
Start: 2025-06-04

## 2025-06-04 RX ORDER — TRIAMTERENE AND HYDROCHLOROTHIAZIDE 37.5; 25 MG/1; MG/1
1 CAPSULE ORAL
Qty: 100 CAPSULE | Refills: 3 | Status: SHIPPED | OUTPATIENT
Start: 2025-06-04

## 2025-06-04 RX ORDER — METFORMIN HYDROCHLORIDE 500 MG/1
500 TABLET, EXTENDED RELEASE ORAL 2 TIMES DAILY
Qty: 200 TABLET | Refills: 3 | Status: SHIPPED | OUTPATIENT
Start: 2025-06-04

## 2025-06-04 RX ORDER — ROSUVASTATIN CALCIUM 20 MG/1
20 TABLET, COATED ORAL EVERY EVENING
Qty: 100 TABLET | Refills: 3 | Status: SHIPPED | OUTPATIENT
Start: 2025-06-04

## 2025-06-04 RX ORDER — BUPROPION HYDROCHLORIDE 200 MG/1
200 TABLET, EXTENDED RELEASE ORAL 2 TIMES DAILY
Qty: 200 TABLET | Refills: 3 | Status: SHIPPED | OUTPATIENT
Start: 2025-06-04

## 2025-06-04 RX ORDER — EZETIMIBE 10 MG/1
10 TABLET ORAL DAILY
Qty: 100 TABLET | Refills: 3 | Status: SHIPPED | OUTPATIENT
Start: 2025-06-04

## 2025-06-04 RX ORDER — ESCITALOPRAM OXALATE 20 MG/1
20 TABLET ORAL
Qty: 100 TABLET | Refills: 3 | Status: SHIPPED | OUTPATIENT
Start: 2025-06-04

## 2025-06-04 RX ORDER — LEVOTHYROXINE SODIUM 75 UG/1
75 TABLET ORAL
Qty: 100 TABLET | Refills: 3 | Status: SHIPPED | OUTPATIENT
Start: 2025-06-04

## 2025-06-04 RX ORDER — LOSARTAN POTASSIUM 50 MG/1
50 TABLET ORAL DAILY
Qty: 100 TABLET | Refills: 3 | Status: SHIPPED | OUTPATIENT
Start: 2025-06-04

## 2025-06-04 RX ORDER — NIFEDIPINE 30 MG/1
30 TABLET, EXTENDED RELEASE ORAL DAILY
Qty: 100 TABLET | Refills: 3 | Status: SHIPPED | OUTPATIENT
Start: 2025-06-04

## 2025-06-04 ASSESSMENT — ENCOUNTER SYMPTOMS: GENERAL WELL-BEING: GOOD

## 2025-06-04 ASSESSMENT — PATIENT HEALTH QUESTIONNAIRE - PHQ9
5. POOR APPETITE OR OVEREATING: NOT AT ALL
CLINICAL INTERPRETATION OF PHQ2 SCORE: 0
6. FEELING BAD ABOUT YOURSELF - OR THAT YOU ARE A FAILURE OR HAVE LET YOURSELF OR YOUR FAMILY DOWN: NOT AL ALL
3. TROUBLE FALLING OR STAYING ASLEEP OR SLEEPING TOO MUCH: NOT AT ALL
8. MOVING OR SPEAKING SO SLOWLY THAT OTHER PEOPLE COULD HAVE NOTICED. OR THE OPPOSITE, BEING SO FIGETY OR RESTLESS THAT YOU HAVE BEEN MOVING AROUND A LOT MORE THAN USUAL: NOT AT ALL
SUM OF ALL RESPONSES TO PHQ QUESTIONS 1-9: 1
SUM OF ALL RESPONSES TO PHQ9 QUESTIONS 1 AND 2: 0
9. THOUGHTS THAT YOU WOULD BE BETTER OFF DEAD, OR OF HURTING YOURSELF: NOT AT ALL
1. LITTLE INTEREST OR PLEASURE IN DOING THINGS: NOT AT ALL
7. TROUBLE CONCENTRATING ON THINGS, SUCH AS READING THE NEWSPAPER OR WATCHING TELEVISION: NOT AT ALL
4. FEELING TIRED OR HAVING LITTLE ENERGY: SEVERAL DAYS
2. FEELING DOWN, DEPRESSED, IRRITABLE, OR HOPELESS: NOT AT ALL
5. POOR APPETITE OR OVEREATING: 0 - NOT AT ALL

## 2025-06-04 ASSESSMENT — ACTIVITIES OF DAILY LIVING (ADL): BATHING_REQUIRES_ASSISTANCE: 0

## 2025-06-04 ASSESSMENT — FIBROSIS 4 INDEX: FIB4 SCORE: 2.32
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
## 2025-07-10 DIAGNOSIS — R80.9 TYPE 2 DIABETES MELLITUS WITH MICROALBUMINURIA, WITHOUT LONG-TERM CURRENT USE OF INSULIN (HCC): ICD-10-CM

## 2025-07-10 DIAGNOSIS — E11.29 TYPE 2 DIABETES MELLITUS WITH MICROALBUMINURIA, WITHOUT LONG-TERM CURRENT USE OF INSULIN (HCC): ICD-10-CM

## 2025-07-11 NOTE — TELEPHONE ENCOUNTER
Received request via: Patient    Was the patient seen in the last year in this department? Yes    Does the patient have an active prescription (recently filled or refills available) for medication(s) requested? No    [facility]arm[facility]    Does the patient have [facility] and need 100-day supply? (This applies to ALL medications) Yes, quantity updated to 100 days

## 2025-07-31 DIAGNOSIS — R80.9 TYPE 2 DIABETES MELLITUS WITH MICROALBUMINURIA, WITHOUT LONG-TERM CURRENT USE OF INSULIN (HCC): ICD-10-CM

## 2025-07-31 DIAGNOSIS — E11.29 TYPE 2 DIABETES MELLITUS WITH MICROALBUMINURIA, WITHOUT LONG-TERM CURRENT USE OF INSULIN (HCC): ICD-10-CM

## 2025-09-04 ENCOUNTER — APPOINTMENT (OUTPATIENT)
Dept: MEDICAL GROUP | Facility: PHYSICIAN GROUP | Age: 80
End: 2025-09-04
Payer: MEDICARE